# Patient Record
Sex: FEMALE | Race: WHITE | NOT HISPANIC OR LATINO | Employment: FULL TIME | URBAN - METROPOLITAN AREA
[De-identification: names, ages, dates, MRNs, and addresses within clinical notes are randomized per-mention and may not be internally consistent; named-entity substitution may affect disease eponyms.]

---

## 2018-08-03 ENCOUNTER — OFFICE VISIT (OUTPATIENT)
Dept: NEUROLOGY | Facility: CLINIC | Age: 33
End: 2018-08-03
Payer: COMMERCIAL

## 2018-08-03 ENCOUNTER — LAB VISIT (OUTPATIENT)
Dept: LAB | Facility: HOSPITAL | Age: 33
End: 2018-08-03
Attending: PSYCHIATRY & NEUROLOGY
Payer: COMMERCIAL

## 2018-08-03 VITALS
RESPIRATION RATE: 16 BRPM | WEIGHT: 279.13 LBS | SYSTOLIC BLOOD PRESSURE: 148 MMHG | DIASTOLIC BLOOD PRESSURE: 102 MMHG | HEIGHT: 59 IN | BODY MASS INDEX: 56.27 KG/M2 | HEART RATE: 95 BPM

## 2018-08-03 DIAGNOSIS — G37.9 DEMYELINATING DISEASE: ICD-10-CM

## 2018-08-03 DIAGNOSIS — F39 MOOD DISORDER: ICD-10-CM

## 2018-08-03 DIAGNOSIS — G37.9 DEMYELINATING DISEASE: Primary | ICD-10-CM

## 2018-08-03 DIAGNOSIS — R03.0 ELEVATED BLOOD PRESSURE READING: ICD-10-CM

## 2018-08-03 LAB
25(OH)D3+25(OH)D2 SERPL-MCNC: 39 NG/ML
ALBUMIN SERPL BCP-MCNC: 3.4 G/DL
ALP SERPL-CCNC: 88 U/L
ALT SERPL W/O P-5'-P-CCNC: 16 U/L
ANION GAP SERPL CALC-SCNC: 10 MMOL/L
AST SERPL-CCNC: 15 U/L
BASOPHILS # BLD AUTO: 0.04 K/UL
BASOPHILS NFR BLD: 0.4 %
BILIRUB SERPL-MCNC: 0.3 MG/DL
BUN SERPL-MCNC: 13 MG/DL
CALCIUM SERPL-MCNC: 9.3 MG/DL
CHLORIDE SERPL-SCNC: 106 MMOL/L
CO2 SERPL-SCNC: 23 MMOL/L
CREAT SERPL-MCNC: 0.7 MG/DL
DIFFERENTIAL METHOD: ABNORMAL
EOSINOPHIL # BLD AUTO: 0.3 K/UL
EOSINOPHIL NFR BLD: 2.7 %
ERYTHROCYTE [DISTWIDTH] IN BLOOD BY AUTOMATED COUNT: 14.6 %
ERYTHROCYTE [SEDIMENTATION RATE] IN BLOOD BY WESTERGREN METHOD: 16 MM/HR
EST. GFR  (AFRICAN AMERICAN): >60 ML/MIN/1.73 M^2
EST. GFR  (NON AFRICAN AMERICAN): >60 ML/MIN/1.73 M^2
GLUCOSE SERPL-MCNC: 86 MG/DL
HCT VFR BLD AUTO: 39.2 %
HGB BLD-MCNC: 12.6 G/DL
HIV1+2 IGG SERPL QL IA.RAPID: NEGATIVE
LYMPHOCYTES # BLD AUTO: 2.4 K/UL
LYMPHOCYTES NFR BLD: 24.8 %
MCH RBC QN AUTO: 26.5 PG
MCHC RBC AUTO-ENTMCNC: 32.1 G/DL
MCV RBC AUTO: 83 FL
MONOCYTES # BLD AUTO: 0.7 K/UL
MONOCYTES NFR BLD: 7.2 %
NEUTROPHILS # BLD AUTO: 6.2 K/UL
NEUTROPHILS NFR BLD: 64.9 %
PLATELET # BLD AUTO: 481 K/UL
PMV BLD AUTO: 9.2 FL
POTASSIUM SERPL-SCNC: 4.3 MMOL/L
PROT SERPL-MCNC: 7.3 G/DL
RBC # BLD AUTO: 4.75 M/UL
RPR SER QL: NORMAL
SODIUM SERPL-SCNC: 139 MMOL/L
WBC # BLD AUTO: 9.58 K/UL

## 2018-08-03 PROCEDURE — 3008F BODY MASS INDEX DOCD: CPT | Mod: CPTII,S$GLB,, | Performed by: PSYCHIATRY & NEUROLOGY

## 2018-08-03 PROCEDURE — 86038 ANTINUCLEAR ANTIBODIES: CPT

## 2018-08-03 PROCEDURE — 86235 NUCLEAR ANTIGEN ANTIBODY: CPT

## 2018-08-03 PROCEDURE — 86592 SYPHILIS TEST NON-TREP QUAL: CPT

## 2018-08-03 PROCEDURE — 86235 NUCLEAR ANTIGEN ANTIBODY: CPT | Mod: 59

## 2018-08-03 PROCEDURE — 80053 COMPREHEN METABOLIC PANEL: CPT

## 2018-08-03 PROCEDURE — 99999 PR PBB SHADOW E&M-EST. PATIENT-LVL III: CPT | Mod: PBBFAC,,, | Performed by: PSYCHIATRY & NEUROLOGY

## 2018-08-03 PROCEDURE — 82164 ANGIOTENSIN I ENZYME TEST: CPT

## 2018-08-03 PROCEDURE — 82306 VITAMIN D 25 HYDROXY: CPT

## 2018-08-03 PROCEDURE — 80074 ACUTE HEPATITIS PANEL: CPT

## 2018-08-03 PROCEDURE — 86617 LYME DISEASE ANTIBODY: CPT | Mod: 59

## 2018-08-03 PROCEDURE — 86701 HIV-1ANTIBODY: CPT

## 2018-08-03 PROCEDURE — 85025 COMPLETE CBC W/AUTO DIFF WBC: CPT

## 2018-08-03 PROCEDURE — 83519 RIA NONANTIBODY: CPT | Mod: 59

## 2018-08-03 PROCEDURE — 99205 OFFICE O/P NEW HI 60 MIN: CPT | Mod: S$GLB,,, | Performed by: PSYCHIATRY & NEUROLOGY

## 2018-08-03 PROCEDURE — 85651 RBC SED RATE NONAUTOMATED: CPT

## 2018-08-03 PROCEDURE — 36415 COLL VENOUS BLD VENIPUNCTURE: CPT

## 2018-08-03 PROCEDURE — 86256 FLUORESCENT ANTIBODY TITER: CPT | Mod: 91

## 2018-08-03 RX ORDER — NORGESTIMATE AND ETHINYL ESTRADIOL 7DAYSX3 LO
1 KIT ORAL DAILY
COMMUNITY
Start: 2018-07-31 | End: 2020-05-04

## 2018-08-03 RX ORDER — BUPROPION HYDROCHLORIDE 300 MG/1
300 TABLET ORAL DAILY
COMMUNITY
Start: 2018-07-06 | End: 2018-10-11 | Stop reason: SDUPTHER

## 2018-08-03 RX ORDER — FLUOXETINE 20 MG/1
20 TABLET ORAL DAILY
COMMUNITY
End: 2018-10-11

## 2018-08-03 RX ORDER — LEVOTHYROXINE SODIUM 75 UG/1
75 TABLET ORAL
COMMUNITY
Start: 2018-07-06

## 2018-08-03 NOTE — PROGRESS NOTES
HPI: Sandra Castelan is a 32 y.o. female who states who was diagnosed with retrobulbar optic neuritis in June 2018. This was diagnosed at [facility] in [place] while she was on vacation.   She had OCT testing.   She was recommended to have MRI brain, Tspine and C spine which she had at [facility]  She states she was on vacation heading to [place] when she gradual onset of blurred and painful vision in right eye which more severely worsened over 2 days. She was seen in a clinic and was not place on steroids. She has had great recovery of her vision. She was on vacation and declined IV steroids. She followed up with her local eye doc and was told oral was not recommended. She fells she can see much better and she was cleared to drive.  She has seen her MRI reports (PCP ordered this) which are suggestive of MS.  In reviewing this, she reports she has had headaches for years. She has had left arm and hand numbness for years along with left leg numbness. She does admit to fatigue.  She has been treated for anxiety and depression by PCP.She has noted some occasional dizziness.   She repots some poor sleep at times.She has had frequent urgent urination and sometimes infrequent emptying.   She reports some brain fog at times.  She feels her anxiety could be better controlled.     She works full time as a  at a [employer]. She has step children.    Her mom is a retired RN  Review of Systems   Constitutional: Positive for malaise/fatigue. Negative for fever.   HENT: Negative for nosebleeds.    Eyes: Negative for double vision.   Respiratory: Negative for hemoptysis.    Cardiovascular: Negative for leg swelling.   Gastrointestinal: Negative for blood in stool.   Genitourinary: Negative for hematuria.   Musculoskeletal: Negative for falls.   Skin: Negative for rash.   Neurological: Negative for seizures.   Psychiatric/Behavioral: Negative for suicidal ideas. The patient is nervous/anxious.         NO  severe panic         I have reviewed all of this patient's past medical and surgical histories as well as family and social histories and active allergies and medications as documented in the electronic medical record.      Exam:  Gen Appearance, well developed/nourished in no apparent distress  CV: 2+ distal pulses with no edema or swelling  Neuro:  MS: Awake, alert, oriented to place, person, time, situation. Sustains attention. Recent/remote memory intact, Language is full to spontaneous speech/repetition/naming/comprehension. Fund of Knowledge is full  CN: Optic discs are flat with normal vasculature, PERRL, Extraoccular movements and visual fields are full. Normal facial sensation and strength, Hearing symmetric, Tongue and Palate are midline and strong. Shoulder Shrug symmetric and strong.  Motor: Normal bulk, tone, no abnormal movements. 5/5 strength bilateral upper/lower extremities with 2+ reflexes and bilateral plantar response  Sensory: symmetric to light touch, pain, temp, and vibration/proprioception. Romberg negative  Cerebellar: Finger-nose,Heal-shin, Rapid alternating movements intact  Gait: Normal stance, no ataxia    Imagin18 MRI brain: Multiple areas of increased white matter signal in a pattern suggestive of multiple sclerosis, measuring up to 1 cm in the right frontal periventricular region.  Nothing enhancing    18 MRI C spine: 1. No evidence demyelinating process within the cervical cord.  2. Mild disc bulges as above from C3-6, greatest at C5-6.    18 MRI T spine: The thoracic spine demonstrates a normal kyphosis.  There is disc desiccation and loss of disc height from T6-9.  There is a left paracentral disc herniation at T8-9 with contact and mild deformity of the left paracentral cord.  No other focal disc protrusion is noted.  No abnormal cord signal is seen.  No abnormal enhancement is seen within the thoracic cord.  Vertebral body marrow signal is  unremarkable.         Assessment/Plan: Sandra Castelan is a 32 y.o. female diagnosed with optic neuritis in her right eye in 6/2018 by OCT. Subsequent MRI brain 7/2018 shows multiple chronic demyelinating plaques concerning for Multiple Sclerosis. She has noted some left arm and leg numbness for years, dizziness, fatigue, mood disorder, and urinary symptoms. Imaging and cluster of symptoms are likely most consistent with relapsing and remitting MS given her lesions and symptoms   by time and space  I recommend:   1. She was not given IV steroids at the time of acute ON, but has greatly improved   2. Labs for MS mimics: Lyme titers, FRENCH, hep panel, HiV, RPRP Ace-level, SSA/SSB antibodies, ESR, and paraneoplastic panel, CMP, CBC  3. Also check  Vit D  4. Discussed utility of LP. If the above is negative, her symptoms are rather pathognomonic. There are no signs of infection here. No one test is diagnostic of MS. Whether or not oligoclonal bands or other indices are present here, this patient will need treatment for MS if the above studies are negative. We agree to hold on LP unless any changes  5. Discussed various DMT. Gave her info on Copaxone and interferon. Teratogenicity risks reviewed. Will plan to start DMT of her choice after labs are resulted. Will need follow up MRIs in 6-12 months  6. HTN history denied- states her BP runs more normal at home.   7. Will consult psychiatry for mood as she feels anxiety and depression could be better controlled.   8. May need fatigue medications going forward  RTC 3 months  Patient was asked to await lab results to confer about MS therapy moving forward.

## 2018-08-04 LAB
ANTI SM ANTIBODY: 0.11 EU
ANTI-SM INTERPRETATION: NEGATIVE
ANTI-SSA ANTIBODY: 1.33 EU
ANTI-SSA INTERPRETATION: NEGATIVE
ANTI-SSB ANTIBODY: 0 EU
ANTI-SSB INTERPRETATION: NEGATIVE

## 2018-08-06 LAB
ACE SERPL-CCNC: 19 U/L
ANA SER QL IF: NORMAL
HAV IGM SERPL QL IA: NEGATIVE
HBV CORE IGM SERPL QL IA: NEGATIVE
HBV SURFACE AG SERPL QL IA: NEGATIVE
HCV AB SERPL QL IA: NEGATIVE

## 2018-08-07 ENCOUNTER — PATIENT MESSAGE (OUTPATIENT)
Dept: PSYCHIATRY | Facility: CLINIC | Age: 33
End: 2018-08-07

## 2018-08-07 ENCOUNTER — PATIENT MESSAGE (OUTPATIENT)
Dept: NEUROLOGY | Facility: CLINIC | Age: 33
End: 2018-08-07

## 2018-08-07 LAB
B BURGDOR IGG SER QL IB: NEGATIVE
B BURGDOR IGM SER QL IB: NEGATIVE

## 2018-08-09 ENCOUNTER — PATIENT MESSAGE (OUTPATIENT)
Dept: NEUROLOGY | Facility: CLINIC | Age: 33
End: 2018-08-09

## 2018-08-10 ENCOUNTER — TELEPHONE (OUTPATIENT)
Dept: PHARMACY | Facility: CLINIC | Age: 33
End: 2018-08-10

## 2018-08-10 ENCOUNTER — TELEPHONE (OUTPATIENT)
Dept: NEUROLOGY | Facility: CLINIC | Age: 33
End: 2018-08-10

## 2018-08-10 DIAGNOSIS — G35 MULTIPLE SCLEROSIS: Primary | ICD-10-CM

## 2018-08-10 LAB
ACHR BIND AB SER-SCNC: NORMAL NMOL/L
AMPHIPHYSIN AB TITR SER: NEGATIVE TITER
CV2 IGG TITR SER: NEGATIVE TITER
GLIAL NUC TYPE 1 AB TITR SER: NEGATIVE TITER
HU1 AB TITR SER: NEGATIVE TITER
HU2 AB TITR SER IF: NEGATIVE TITER
HU3 AB TITR SER: NEGATIVE TITER
IMMUNOLOGIST REVIEW: NORMAL
NACHR AB SER-SCNC: 0 NMOL/L
PAVAL REFLEX TEST ADDED: NORMAL
PCA-1 AB TITR SER: NEGATIVE TITER
PCA-2 AB TITR SER: NEGATIVE TITER
PCA-TR AB TITR SER: NEGATIVE TITER
STRIA MUS AB TITR SER: NEGATIVE TITER
VGCC-N BIND AB SER-SCNC: 0 NMOL/L
VGCC-P/Q BIND AB SER-SCNC: 0 NMOL/L
VGKC AB SER-SCNC: 0 NMOL/L

## 2018-08-10 RX ORDER — GLATIRAMER 40 MG/ML
40 INJECTION, SOLUTION SUBCUTANEOUS
Qty: 12 ML | Refills: 12 | Status: SHIPPED | OUTPATIENT
Start: 2018-08-10 | End: 2019-08-21

## 2018-08-10 NOTE — TELEPHONE ENCOUNTER
DOCUMENTATION ONLY  FYI  Glatiramer does not require a Prior Authorization through the patient's insurance.    Copay: $430.48    E-voucher applied - copay is $0    Patient Assistance IS NOT required. Forwarded to the clinical pharmacist for consult and shipment.  PRASAD

## 2018-08-10 NOTE — TELEPHONE ENCOUNTER
Patient needs to start Copaxone for MS per orders.  Rx being sent to [facility]. Patient aware  Just FYI

## 2018-08-21 ENCOUNTER — PATIENT MESSAGE (OUTPATIENT)
Dept: NEUROLOGY | Facility: CLINIC | Age: 33
End: 2018-08-21

## 2018-08-21 ENCOUNTER — TELEPHONE (OUTPATIENT)
Dept: PHARMACY | Facility: CLINIC | Age: 33
End: 2018-08-21

## 2018-08-21 NOTE — TELEPHONE ENCOUNTER
Initial Glatiramer 40mg consult completed on 18. Glatiramer  will be shipped on  to arrive at patient's work on  via [employer]. $0 copay. Patient intends to start Glatiramer on .  Provided MYLAN Nurse Educator number: [phone] for [employer] and setting up in-home nurse injection training. Address confirmed, CC on file. Confirmed 2 patient identifiers - name and . Therapy Appropriate.    --Injection experience: none; patient reported she felt comfortable.    Counseled patient on administration directions:   Inject Glatiramer into the skin 3 times per week (separate by 48 hours)   Take out of the refrigerator 30-60 minutes prior to injection.   Wash hands before and after injection.   Monthly RX will come with gauze, Band-Aids, and alcohol swabs.   Patient may inject in either the tops of thighs or abdomen- but at least 2 inches away from the belly button, upper hips, but always below the waist, or the outer or back part of his/ her upper arm.   Patient is to wipe down the injection site with the alcohol pad, wait to dry.  Pinch about a 2 inch fold of skin between the thumb and index finger, insert the needle at a 90 degree angle straight into the skin.  Hold the syringe steady and slowly push down the plunger, then remove the needle.    Patient will use sharps container; once full, per ROD vazquez, he/she may lock the sharps container and place in the trash. He/ She can then contact the Pharmacy and we will replace the sharps at no additional charge.    Patient was counseled on possible side effects:  · Injection site reaction: redness, soreness, itching, bruising, lipoatrophy, swelling, lumps, pain and rash  · Flushing, chest pain, palpitations, anxiety, dyspnea, constriction of the throat, and urticaria. These symptoms are generally transient and self-limited and do not require specific treatment.   · may occur early or may have their onset several months after the initiation of  treatment  · Infection, weakness, upset stomach, flushing, back pain  · Nausea and vomiting  · ER precautions advised for signs and symptoms of allergic reaction      Patient was advised to keep a calendar to stay compliant. Consultation included: indication; goals of treatment; administration; storage and handling; side effects; how to handle side effects; the importance of compliance; how to handle missed doses; the importance of laboratory monitoring; the importance of keeping all follow up appointments.  Patient understands to report any medication changes to [facility] and provider. All questions answered and addressed to patients satisfaction. I will f/u with her in 1 week from start, [facility] to contact patient in 3 weeks for refills.    Charlene Holman, PharmD  Specialty Pharmacy Clinical Pharmacist  [facility]  P: [phone]

## 2018-09-10 ENCOUNTER — PATIENT MESSAGE (OUTPATIENT)
Dept: NEUROLOGY | Facility: CLINIC | Age: 33
End: 2018-09-10

## 2018-09-17 ENCOUNTER — TELEPHONE (OUTPATIENT)
Dept: PHARMACY | Facility: CLINIC | Age: 33
End: 2018-09-17

## 2018-09-17 NOTE — TELEPHONE ENCOUNTER
Attempted to reach patient regarding Glatiramer refill and follow up. Left voicemail and sent [employer] message for patient to return call. Will continue you reach out if no response.     Julio César Thomas, PharmD  Clinical Pharmacist  [facility]  P: [phone]

## 2018-09-22 ENCOUNTER — PATIENT MESSAGE (OUTPATIENT)
Dept: NEUROLOGY | Facility: CLINIC | Age: 33
End: 2018-09-22

## 2018-10-10 ENCOUNTER — TELEPHONE (OUTPATIENT)
Dept: PHARMACY | Facility: CLINIC | Age: 33
End: 2018-10-10

## 2018-10-11 ENCOUNTER — OFFICE VISIT (OUTPATIENT)
Dept: PSYCHIATRY | Facility: CLINIC | Age: 33
End: 2018-10-11
Attending: PSYCHIATRY & NEUROLOGY
Payer: COMMERCIAL

## 2018-10-11 VITALS
RESPIRATION RATE: 18 BRPM | BODY MASS INDEX: 56.84 KG/M2 | HEART RATE: 78 BPM | HEIGHT: 59 IN | WEIGHT: 281.94 LBS | DIASTOLIC BLOOD PRESSURE: 86 MMHG | SYSTOLIC BLOOD PRESSURE: 134 MMHG

## 2018-10-11 DIAGNOSIS — F41.1 GAD (GENERALIZED ANXIETY DISORDER): ICD-10-CM

## 2018-10-11 DIAGNOSIS — F33.0 MILD EPISODE OF RECURRENT MAJOR DEPRESSIVE DISORDER: Primary | ICD-10-CM

## 2018-10-11 PROCEDURE — 3008F BODY MASS INDEX DOCD: CPT | Mod: CPTII,S$GLB,, | Performed by: PSYCHIATRY & NEUROLOGY

## 2018-10-11 PROCEDURE — 99999 PR PBB SHADOW E&M-EST. PATIENT-LVL III: CPT | Mod: PBBFAC,,, | Performed by: PSYCHIATRY & NEUROLOGY

## 2018-10-11 PROCEDURE — 99203 OFFICE O/P NEW LOW 30 MIN: CPT | Mod: S$GLB,,, | Performed by: PSYCHIATRY & NEUROLOGY

## 2018-10-11 PROCEDURE — 90833 PSYTX W PT W E/M 30 MIN: CPT | Mod: S$GLB,,, | Performed by: PSYCHIATRY & NEUROLOGY

## 2018-10-11 RX ORDER — ESCITALOPRAM OXALATE 10 MG/1
10 TABLET ORAL DAILY
Qty: 90 TABLET | Refills: 0 | Status: SHIPPED | OUTPATIENT
Start: 2018-10-11 | End: 2018-12-07 | Stop reason: SDUPTHER

## 2018-10-11 RX ORDER — BUPROPION HYDROCHLORIDE 300 MG/1
300 TABLET ORAL DAILY
Qty: 90 TABLET | Refills: 0 | Status: SHIPPED | OUTPATIENT
Start: 2018-10-11 | End: 2018-12-07 | Stop reason: SDUPTHER

## 2018-10-11 NOTE — PROGRESS NOTES
Outpatient Psychiatry Initial Visit (MD/NP)    10/11/2018    Sandra Castelan, a 32 y.o. female, presenting for initial evaluation visit. Met with patient.    Reason for Encounter: Referral from Dr. Lyn. Patient complains of   Chief Complaint   Patient presents with    Depression    Anxiety   .    History of Present Illness:     Current Medication  Wellbutrin XL 300mg QDay - years  Prozac 20mg QDay - years    Past Medication  Paxil in highschool and doesn't remember how she did  Cymbalta - worsened anxiety  Pristiq - did not do well    Psychosocial   for a vet clinic.  She is engaged and her fiancee has a 12 year old that lives with them full time.  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      Endorses Symptoms of Depression: +diminished mood or +loss of interest/anhedonia; irritability, +diminished energy, +change in sleep, change in appetite, diminished concentration or cognition or indecisiveness, PMA/R, +excessive guilt or hopelessness or worthlessness, suicidal ideations    Concentration somewhat affected by MS    Endorses Changes in Sleep: trouble with initiation, +maintenance, early morning awakening with inability to return to sleep, hypersomnolence     Denies Suicidal/Homicidal ideations: active/passive ideations, organized plans, future intentions    Denies psychosis or regis    Symptoms of TONY: all of the following excessive anxiety/worry/fear, more days than not, about numerous issues, difficult to control, with restlessness, fatigue, poor concentration, irritability, muscle tension, sleep disturbance; causes functionally impairing distress     Symptoms of Panic Disorder: very seldom     Denies Symptoms of Social Anxiety Disorder: excessive fear/anxiety regarding social situations with fear of being negatively evaluated, avoidaant behavior, functionally impairing distress    Denies Symptoms of PTSD: h/o trauma; re-experiencing/intrusive symptoms, avoidant behavior, negative alterations in  cognition or mood, hyperarousal symptoms; with or without dissociative symptoms     Symptoms of Eating Disorders: anorexia, bulimia or binging    Denies Substance Use: intoxication, withdrawal, tolerance, used in larger amounts or duration than intended, unsuccessful attempts to limit or quit, increased time engaging in or seeking out, cravings or strong desire to use, failure to fulfill obligations, negative consequences in social/interpersonal/occupational,/recreational areas, use in dangerous situations, medical or psychological consequences     Drinks occasionally and smokes 3 cigarettes per day    PSYCHOTHERAPY ADD-ON +[id number]   30 (16-37*) minutes    Site: [facility]  Time: 16 minutes  Participants: Met with patient    Therapeutic Intervention Type: behavior modifying psychotherapy, supportive psychotherapy  Why chosen therapy is appropriate versus another modality: relevant to diagnosis    Target symptoms: depression, anxiety , adjustment  Primary focus: coping, anxiety  Psychotherapeutic techniques: supportive behavior modifying psychoeducation    Outcome monitoring methods: self-report, observation    Patient's response to intervention:  The patient's response to intervention is accepting.    Progress toward goals:  The patient's progress toward goals is good.    Discussed diet / exercise, parenting skills and coping      Review Of Systems:     GENERAL:  No weight gain or loss  SKIN:  No rashes or lacerations  HEAD:  No headaches  EYES:  No exophthalmos, jaundice or blindness  EARS:  No dizziness, tinnitus or hearing loss  NOSE:  No changes in smell  MOUTH & THROAT:  No dyskinetic movements or obvious goiter  CHEST:  No shortness of breath, hyperventilation or cough  CARDIOVASCULAR:  No tachycardia or chest pain  ABDOMEN:  No nausea, vomiting, pain, constipation or diarrhea  URINARY:  No frequency, dysuria or sexual dysfunction  ENDOCRINE:  No polydipsia, polyuria  MUSCULOSKELETAL: pain in legs at  "times  NEUROLOGIC:  No weakness, sensory changes, seizures, confusion, memory loss, tremor or other abnormal movements    Current Evaluation:     Nutritional Screening: Considering the patient's height and weight, medications, medical history and preferences, should a referral be made to the dietitian?  yes    Constitutional  Vitals:  Most recent vital signs, dated less than 90 days prior to this appointment, were reviewed.    Vitals:    10/11/18 1221   BP: 134/86   Pulse: 78   Resp: 18   Weight: 127.9 kg (281 lb 15.5 oz)   Height: 4' 11" (1.499 m)        General:  unremarkable, age appropriate     Musculoskeletal  Muscle Strength/Tone:  no spasicity, no rigidity   Gait & Station:  non-ataxic     Psychiatric  Speech:  no latency; no press   Mood & Affect:  steady, happy  congruent and appropriate   Thought Process:  normal and logical   Associations:  intact   Thought Content:  normal, no suicidality, no homicidality, delusions, or paranoia   Insight:  intact   Judgement: behavior is adequate to circumstances   Orientation:  grossly intact   Memory: intact for content of interview   Language: grossly intact   Attention Span & Concentration:  able to focus   Fund of Knowledge:  intact and appropriate to age and level of education       Relevant Elements of Neurological Exam: normal gait    Functioning in Relationships:  Spouse/partner: engaged good  Peers: good  Employers: good    Laboratory Data  No visits with results within 1 Month(s) from this visit.   Latest known visit with results is:   Lab Visit on 08/03/2018   Component Date Value Ref Range Status    FRENCH Screen 08/03/2018 Negative <1:160  Negative <1:160 Final    Sed Rate 08/03/2018 16  0 - 20 mm/Hr Final    Angio Convert Enzyme 08/03/2018 19  8 - 53 U/L Final    LYME AB IGG BY WB: 08/03/2018 Negative  Negative Final    Lyme Ab IgM by WB: 08/03/2018 Negative  Negative Final    Anti Sm Antibody 08/03/2018 0.11  0.00 - 19.99 EU Final    Anti-Sm " Interpretation 08/03/2018 Negative  Negative Final    Anti-SSB Antibody 08/03/2018 0.00  0.00 - 19.99 EU Final    Anti-SSB Interpretation 08/03/2018 Negative  Negative Final    Anti-SSA Antibody 08/03/2018 1.33  0.00 - 19.99 EU Final    Anti-SSA Interpretation 08/03/2018 Negative  Negative Final    HIV Rapid Testing 08/03/2018 Negative  Negative Final    Hepatitis B Surface Ag 08/03/2018 Negative   Final    Hep B C IgM 08/03/2018 Negative   Final    Hep A IgM 08/03/2018 Negative   Final    Hepatitis C Ab 08/03/2018 Negative   Final    Vit D, 25-Hydroxy 08/03/2018 39  30 - 96 ng/mL Final    RPR 08/03/2018 Non-reactive  Non-reactive Final    NMO Interpretive Comments 08/03/2018 SEE BELOW   Final    PAVAL CASH-1, Serum 08/03/2018 Negative  <1:240 titer Final    PAVAL reflex test added 08/03/2018 None.   Final    PAVAL CASH-2, Serum 08/03/2018 Negative  <1:240 titer Final    PAVAL CASH-3, Serum 08/03/2018 Negative  <1:240 titer Final    PAVAL AGNA-1, Serum 08/03/2018 Negative  <1:240 titer Final    PAVAL, PCA-1, Serum 08/03/2018 Negative  <1:240 titer Final    PAVAL, PCA-2, Serum 08/03/2018 Negative  <1:240 titer Final    PAVAL, PCA-Tr, Serum 08/03/2018 Negative  <1:240 titer Final    PAVAL,  Amphiphysin Ab, Serum 08/03/2018 Negative  <1:240 titer Final    CRMP-5 IgG 08/03/2018 Negative  <1:240 titer Final    Striated Muscle Ab 08/03/2018 Negative  <1:120 titer Final    P/Q Type Calcium Channel Ab 08/03/2018 0.00  <=0.02 nmol/L Final    N-Type Calcium Channel Ab 08/03/2018 0.00  <=0.03 nmol/L Final    AChR Binding Ab, Serum 08/03/2018 SEE BELOW   Final    AChR Ganglionic Neuronal Ab 08/03/2018 0.00  <=0.02 nmol/L Final    Neuronal (V-G) K+ Channel Ab, Serum 08/03/2018 0.00  <=0.02 nmol/L Final    Sodium 08/03/2018 139  136 - 145 mmol/L Final    Potassium 08/03/2018 4.3  3.5 - 5.1 mmol/L Final    Chloride 08/03/2018 106  95 - 110 mmol/L Final    CO2 08/03/2018 23  23 - 29 mmol/L Final     Glucose 08/03/2018 86  70 - 110 mg/dL Final    BUN, Bld 08/03/2018 13  6 - 20 mg/dL Final    Creatinine 08/03/2018 0.7  0.5 - 1.4 mg/dL Final    Calcium 08/03/2018 9.3  8.7 - 10.5 mg/dL Final    Total Protein 08/03/2018 7.3  6.0 - 8.4 g/dL Final    Albumin 08/03/2018 3.4* 3.5 - 5.2 g/dL Final    Total Bilirubin 08/03/2018 0.3  0.1 - 1.0 mg/dL Final    Alkaline Phosphatase 08/03/2018 88  55 - 135 U/L Final    AST 08/03/2018 15  10 - 40 U/L Final    ALT 08/03/2018 16  10 - 44 U/L Final    Anion Gap 08/03/2018 10  8 - 16 mmol/L Final    eGFR if African American 08/03/2018 >60.0  >60 mL/min/1.73 m^2 Final    eGFR if non African American 08/03/2018 >60.0  >60 mL/min/1.73 m^2 Final    WBC 08/03/2018 9.58  3.90 - 12.70 K/uL Final    RBC 08/03/2018 4.75  4.00 - 5.40 M/uL Final    Hemoglobin 08/03/2018 12.6  12.0 - 16.0 g/dL Final    Hematocrit 08/03/2018 39.2  37.0 - 48.5 % Final    MCV 08/03/2018 83  82 - 98 fL Final    MCH 08/03/2018 26.5* 27.0 - 31.0 pg Final    MCHC 08/03/2018 32.1  32.0 - 36.0 g/dL Final    RDW 08/03/2018 14.6* 11.5 - 14.5 % Final    Platelets 08/03/2018 481* 150 - 350 K/uL Final    MPV 08/03/2018 9.2  9.2 - 12.9 fL Final    Gran # (ANC) 08/03/2018 6.2  1.8 - 7.7 K/uL Final    Lymph # 08/03/2018 2.4  1.0 - 4.8 K/uL Final    Mono # 08/03/2018 0.7  0.3 - 1.0 K/uL Final    Eos # 08/03/2018 0.3  0.0 - 0.5 K/uL Final    Baso # 08/03/2018 0.04  0.00 - 0.20 K/uL Final    Gran% 08/03/2018 64.9  38.0 - 73.0 % Final    Lymph% 08/03/2018 24.8  18.0 - 48.0 % Final    Mono% 08/03/2018 7.2  4.0 - 15.0 % Final    Eosinophil% 08/03/2018 2.7  0.0 - 8.0 % Final    Basophil% 08/03/2018 0.4  0.0 - 1.9 % Final    Differential Method 08/03/2018 Automated   Final         Medications  Outpatient Encounter Medications as of 10/11/2018   Medication Sig Dispense Refill    aspirin-acetaminophen-caffeine 250-250-65 mg (EXCEDRIN MIGRAINE) 250-250-65 mg per tablet Take 1 tablet by mouth every 6  (six) hours as needed for Pain.      buPROPion (WELLBUTRIN XL) 300 MG 24 hr tablet Take 1 tablet (300 mg total) by mouth once daily. 90 tablet 0    BUTTERBUR ROOT EXTRACT ORAL Take by mouth.      ergocalciferol, vitamin D2, (VITAMIN D ORAL) Take 800 mg by mouth once daily.      glatiramer (COPAXONE) 40 mg/mL Syrg injection Inject 40 mg into the skin 3 (three) times a week. 12 mL 12    levothyroxine (SYNTHROID) 75 MCG tablet Take 75 mcg by mouth before breakfast.       liothyronine (CYTOMEL) 5 MCG Tab Take 10 mcg by mouth once daily.       omeprazole/sodium bicarbonate (ZEGERID OTC ORAL) Take 1 tablet by mouth 2 (two) times daily as needed.       TRI-LO-ANAID 0.18/0.215/0.25 mg-25 mcg tablet Take 1 tablet by mouth once daily.       [DISCONTINUED] buPROPion (WELLBUTRIN XL) 300 MG 24 hr tablet Take 300 mg by mouth once daily.       [DISCONTINUED] FLUoxetine 20 MG tablet Take 20 mg by mouth once daily.       escitalopram oxalate (LEXAPRO) 10 MG tablet Take 1 tablet (10 mg total) by mouth once daily. 90 tablet 0     No facility-administered encounter medications on file as of 10/11/2018.            Assessment - Diagnosis - Goals:     Impression:       ICD-10-CM ICD-9-CM   1. Mild episode of recurrent major depressive disorder F33.0 296.31   2. TONY (generalized anxiety disorder) F41.1 300.02       Strengths and Liabilities: Strength: Patient accepts guidance/feedback, Strength: Patient is expressive/articulate., Strength: Patient is intelligent., Strength: Patient is motivated for change.    Treatment Goals:  Specify outcomes written in observable, behavioral terms:   cessation of depression and anxiety symptoms    Treatment Plan/Recommendations:     Depression  Stop Prozac  Start Lexapro 10mg QDay  Continue Wellbutrin XL 300mg QDay    Anxiety  Lexapro  Meditation    Obesity  Discussed diet / exercise      Return to Clinic: 1 month    Counseling time: 16  Total time: 35  Consulting clinician was informed of  the encounter and consult note.

## 2018-10-12 ENCOUNTER — TELEPHONE (OUTPATIENT)
Dept: PHARMACY | Facility: CLINIC | Age: 33
End: 2018-10-12

## 2018-10-12 NOTE — TELEPHONE ENCOUNTER
Incoming call regarding Glatiramer.  Confirmed two patient identifiers - name + . Patient denies any missed doses or side effects. She has 5 doses remaining to get her through next week and the following Monday. She confirms no changes to her allergies, health conditions or insurance. She does mention discontinuing fluoxetine and starting Lexapro today - no DDIs. She asked [facility] to ship glatiramer out on 10/18/18 to arrive at her home on 10/19/18 via [employer]. Address confirmed, $0 copay (004), no further supplies needed. All questions answered to patient's satisfaction, [facility] to continue to call for refills monthly. TTN

## 2018-11-05 ENCOUNTER — LAB VISIT (OUTPATIENT)
Dept: LAB | Facility: HOSPITAL | Age: 33
End: 2018-11-05
Attending: PSYCHIATRY & NEUROLOGY
Payer: COMMERCIAL

## 2018-11-05 ENCOUNTER — PATIENT MESSAGE (OUTPATIENT)
Dept: NEUROLOGY | Facility: CLINIC | Age: 33
End: 2018-11-05

## 2018-11-05 ENCOUNTER — OFFICE VISIT (OUTPATIENT)
Dept: NEUROLOGY | Facility: CLINIC | Age: 33
End: 2018-11-05
Payer: COMMERCIAL

## 2018-11-05 VITALS
DIASTOLIC BLOOD PRESSURE: 86 MMHG | SYSTOLIC BLOOD PRESSURE: 126 MMHG | HEIGHT: 59 IN | HEART RATE: 94 BPM | BODY MASS INDEX: 57.33 KG/M2 | RESPIRATION RATE: 18 BRPM | WEIGHT: 284.38 LBS

## 2018-11-05 DIAGNOSIS — F39 MOOD DISORDER: ICD-10-CM

## 2018-11-05 DIAGNOSIS — G43.009 MIGRAINE WITHOUT AURA AND WITHOUT STATUS MIGRAINOSUS, NOT INTRACTABLE: ICD-10-CM

## 2018-11-05 DIAGNOSIS — G35 MULTIPLE SCLEROSIS: Primary | ICD-10-CM

## 2018-11-05 DIAGNOSIS — R79.89 ELEVATED PLATELET COUNT: ICD-10-CM

## 2018-11-05 DIAGNOSIS — G37.9 DEMYELINATING DISEASE: ICD-10-CM

## 2018-11-05 LAB
BASOPHILS # BLD AUTO: 0.05 K/UL
BASOPHILS NFR BLD: 0.4 %
DIFFERENTIAL METHOD: ABNORMAL
EOSINOPHIL # BLD AUTO: 0.3 K/UL
EOSINOPHIL NFR BLD: 2.3 %
ERYTHROCYTE [DISTWIDTH] IN BLOOD BY AUTOMATED COUNT: 14.9 %
HCT VFR BLD AUTO: 36.5 %
HGB BLD-MCNC: 11.7 G/DL
LYMPHOCYTES # BLD AUTO: 3.1 K/UL
LYMPHOCYTES NFR BLD: 22.6 %
MCH RBC QN AUTO: 26.2 PG
MCHC RBC AUTO-ENTMCNC: 32.1 G/DL
MCV RBC AUTO: 82 FL
MONOCYTES # BLD AUTO: 1.1 K/UL
MONOCYTES NFR BLD: 7.9 %
NEUTROPHILS # BLD AUTO: 9.1 K/UL
NEUTROPHILS NFR BLD: 66.8 %
PLATELET # BLD AUTO: 442 K/UL
PMV BLD AUTO: 9.1 FL
RBC # BLD AUTO: 4.47 M/UL
WBC # BLD AUTO: 13.65 K/UL

## 2018-11-05 PROCEDURE — 36415 COLL VENOUS BLD VENIPUNCTURE: CPT

## 2018-11-05 PROCEDURE — 99214 OFFICE O/P EST MOD 30 MIN: CPT | Mod: S$GLB,,, | Performed by: PSYCHIATRY & NEUROLOGY

## 2018-11-05 PROCEDURE — 3008F BODY MASS INDEX DOCD: CPT | Mod: CPTII,S$GLB,, | Performed by: PSYCHIATRY & NEUROLOGY

## 2018-11-05 PROCEDURE — 99999 PR PBB SHADOW E&M-EST. PATIENT-LVL III: CPT | Mod: PBBFAC,,, | Performed by: PSYCHIATRY & NEUROLOGY

## 2018-11-05 PROCEDURE — 85025 COMPLETE CBC W/AUTO DIFF WBC: CPT

## 2018-11-05 RX ORDER — ZONISAMIDE 25 MG/1
CAPSULE ORAL
Qty: 90 CAPSULE | Refills: 5 | Status: SHIPPED | OUTPATIENT
Start: 2018-11-05 | End: 2019-03-11

## 2018-11-08 ENCOUNTER — TELEPHONE (OUTPATIENT)
Dept: PHARMACY | Facility: CLINIC | Age: 33
End: 2018-11-08

## 2018-11-12 ENCOUNTER — TELEPHONE (OUTPATIENT)
Dept: PHARMACY | Facility: CLINIC | Age: 33
End: 2018-11-12

## 2018-11-14 ENCOUNTER — PATIENT MESSAGE (OUTPATIENT)
Dept: NEUROLOGY | Facility: CLINIC | Age: 33
End: 2018-11-14

## 2018-12-06 ENCOUNTER — TELEPHONE (OUTPATIENT)
Dept: PHARMACY | Facility: CLINIC | Age: 33
End: 2018-12-06

## 2018-12-07 ENCOUNTER — OFFICE VISIT (OUTPATIENT)
Dept: PSYCHIATRY | Facility: CLINIC | Age: 33
End: 2018-12-07
Attending: PSYCHIATRY & NEUROLOGY
Payer: COMMERCIAL

## 2018-12-07 VITALS
DIASTOLIC BLOOD PRESSURE: 82 MMHG | RESPIRATION RATE: 20 BRPM | WEIGHT: 285.5 LBS | HEART RATE: 92 BPM | SYSTOLIC BLOOD PRESSURE: 130 MMHG | BODY MASS INDEX: 57.56 KG/M2 | HEIGHT: 59 IN

## 2018-12-07 DIAGNOSIS — F33.41 RECURRENT MAJOR DEPRESSIVE DISORDER, IN PARTIAL REMISSION: Primary | ICD-10-CM

## 2018-12-07 DIAGNOSIS — F41.1 GAD (GENERALIZED ANXIETY DISORDER): ICD-10-CM

## 2018-12-07 PROCEDURE — 99999 PR PBB SHADOW E&M-EST. PATIENT-LVL III: CPT | Mod: PBBFAC,,, | Performed by: PSYCHIATRY & NEUROLOGY

## 2018-12-07 PROCEDURE — 3008F BODY MASS INDEX DOCD: CPT | Mod: CPTII,S$GLB,, | Performed by: PSYCHIATRY & NEUROLOGY

## 2018-12-07 PROCEDURE — 99213 OFFICE O/P EST LOW 20 MIN: CPT | Mod: S$GLB,,, | Performed by: PSYCHIATRY & NEUROLOGY

## 2018-12-07 RX ORDER — BUPROPION HYDROCHLORIDE 300 MG/1
300 TABLET ORAL DAILY
Qty: 90 TABLET | Refills: 1 | Status: SHIPPED | OUTPATIENT
Start: 2018-12-07

## 2018-12-07 RX ORDER — HYDROCHLOROTHIAZIDE 12.5 MG/1
12.5 TABLET ORAL DAILY
COMMUNITY
Start: 2018-11-12 | End: 2019-03-11

## 2018-12-07 RX ORDER — ESCITALOPRAM OXALATE 10 MG/1
10 TABLET ORAL DAILY
Qty: 90 TABLET | Refills: 1 | Status: SHIPPED | OUTPATIENT
Start: 2018-12-07 | End: 2019-08-28

## 2018-12-07 NOTE — PROGRESS NOTES
Outpatient Psychiatry Follow-Up Visit (MD/NP)    12/7/2018    Clinical Status of Patient:  Outpatient (Ambulatory)    Chief Complaint:  Sandra Castelan is a 33 y.o. female who presents today for follow-up of depression and anxiety.  Met with patient.      Interval History and Content of Current Session:  Interim Events/Subjective Report/Content of Current Session:     Current Medication  Wellbutrin XL 300mg QDay - years  Lexapro 10mg QDay    Past Medication  Paxil in highschool and doesn't remember how she did  Cymbalta - worsened anxiety  Pristiq - did not do well     Psychosocial   for a vet clinic.  She is engaged and her fiancee has a 12 year old that lives with them full time.  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      Substance use  Smokes cigarettes four times per day     Improved Symptoms of Depression: less diminished mood or +loss of interest/anhedonia; irritability, improved diminished energy, +change in sleep, change in appetite, diminished concentration or cognition or indecisiveness, PMA/R, resolved excessive guilt or hopelessness or worthlessness, suicidal ideations     Concentration somewhat affected by MS     Improved Changes in Sleep: improved trouble with initiation, less maintenance, early morning awakening with inability to return to sleep, hypersomnolence      Denies Suicidal/Homicidal ideations: active/passive ideations, organized plans, future intentions     Denies psychosis or regis     Improved Symptoms of TONY: all of the following excessive anxiety/worry/fear, more days than not, about numerous issues, difficult to control, with restlessness, fatigue, poor concentration, irritability, muscle tension, sleep disturbance; causes functionally impairing distress     Psychotherapy:  · Target symptoms: depression, anxiety   · Why chosen therapy is appropriate versus another modality: relevant to diagnosis  · Outcome monitoring methods: self-report, observation  · Therapeutic intervention  "type: behavior modifying psychotherapy, supportive psychotherapy  · Topics discussed/themes: difficulty managing affect in interpersonal relationships, building skills sets for symptom management  · The patient's response to the intervention is accepting. The patient's progress toward treatment goals is good.   · Duration of intervention: 10 minutes.  She did not start meditation  Review of Systems   · PSYCHIATRIC: Pertinant items are noted in the narrative.  · CONSTITUTIONAL: No weight gain or loss.   · MUSCULOSKELETAL: No pain or stiffness of the joints.  · NEUROLOGIC: gets chronic migraines  · RESPIRATORY: No shortness of breath.  · CARDIOVASCULAR: No tachycardia or chest pain.  · GASTROINTESTINAL: No nausea, vomiting, pain, constipation or diarrhea.  · GENITOURINARY: No frequency, dysuria or sexual dysfunction.    Past Medical, Family and Social History: The patient's past medical, family and social history have been reviewed and updated as appropriate within the electronic medical record - see encounter notes.    Compliance: yes    Side effects: None    Risk Parameters:  Patient reports no suicidal ideation  Patient reports no homicidal ideation  Patient reports no self-injurious behavior  Patient reports no violent behavior    Exam (detailed: at least 9 elements; comprehensive: all 15 elements)   Constitutional  Vitals:  Most recent vital signs, dated less than 90 days prior to this appointment, were reviewed.   Vitals:    12/07/18 1500   BP: 130/82   Pulse: 92   Resp: 20   Weight: 129.5 kg (285 lb 7.9 oz)   Height: 4' 11" (1.499 m)        General:  unremarkable, age appropriate     Musculoskeletal  Muscle Strength/Tone:  no spasicity, no rigidity   Gait & Station:  non-ataxic     Psychiatric  Speech:  no latency; no press   Mood & Affect:  steady, euthymic  congruent and appropriate   Thought Process:  normal and logical   Associations:  intact   Thought Content:  normal, no suicidality, no homicidality, " delusions, or paranoia   Insight:  intact   Judgement: behavior is adequate to circumstances   Orientation:  grossly intact   Memory: intact for content of interview   Language: grossly intact   Attention Span & Concentration:  able to focus   Fund of Knowledge:  intact and appropriate to age and level of education     Assessment and Diagnosis   Status/Progress: Based on the examination today, the patient's problem(s) is/are improved and well controlled.  New problems have not been presented today.   Co-morbidities are not complicating management of the primary condition.  There are no active rule-out diagnoses for this patient at this time.     General Impression:       ICD-10-CM ICD-9-CM   1. Recurrent major depressive disorder, in partial remission F33.41 296.35   2. TONY (generalized anxiety disorder) F41.1 300.02       Intervention/Counseling/Treatment Plan       Depression    Continue Lexapro 10mg QDay  Continue Wellbutrin XL 300mg QDay     Anxiety  Lexapro  Meditation     Obesity  Discussed diet / exercise          Return to Clinic: as needed

## 2018-12-07 NOTE — TELEPHONE ENCOUNTER
Patient returned refill call for Glatiramer. Delivery confirmed for Thursday 12/13/18. $0.00 copay at 004.     Julio César Thomas, PharmD  Clinical Pharmacist  [facility]  P: [phone]

## 2019-01-09 ENCOUNTER — PATIENT MESSAGE (OUTPATIENT)
Dept: NEUROLOGY | Facility: CLINIC | Age: 34
End: 2019-01-09

## 2019-01-30 ENCOUNTER — TELEPHONE (OUTPATIENT)
Dept: PHARMACY | Facility: CLINIC | Age: 34
End: 2019-01-30

## 2019-03-11 ENCOUNTER — PATIENT MESSAGE (OUTPATIENT)
Dept: NEUROLOGY | Facility: CLINIC | Age: 34
End: 2019-03-11

## 2019-03-11 ENCOUNTER — LAB VISIT (OUTPATIENT)
Dept: LAB | Facility: HOSPITAL | Age: 34
End: 2019-03-11
Attending: PSYCHIATRY & NEUROLOGY
Payer: COMMERCIAL

## 2019-03-11 ENCOUNTER — OFFICE VISIT (OUTPATIENT)
Dept: NEUROLOGY | Facility: CLINIC | Age: 34
End: 2019-03-11
Payer: COMMERCIAL

## 2019-03-11 VITALS
SYSTOLIC BLOOD PRESSURE: 138 MMHG | HEART RATE: 92 BPM | HEIGHT: 59 IN | BODY MASS INDEX: 57.73 KG/M2 | WEIGHT: 286.38 LBS | DIASTOLIC BLOOD PRESSURE: 98 MMHG | RESPIRATION RATE: 18 BRPM

## 2019-03-11 DIAGNOSIS — G35 MULTIPLE SCLEROSIS: Primary | ICD-10-CM

## 2019-03-11 DIAGNOSIS — D72.828 OTHER ELEVATED WHITE BLOOD CELL (WBC) COUNT: ICD-10-CM

## 2019-03-11 DIAGNOSIS — D64.9 ANEMIA, UNSPECIFIED TYPE: ICD-10-CM

## 2019-03-11 DIAGNOSIS — E66.01 MORBID OBESITY WITH BMI OF 50.0-59.9, ADULT: ICD-10-CM

## 2019-03-11 DIAGNOSIS — G43.009 MIGRAINE WITHOUT AURA AND WITHOUT STATUS MIGRAINOSUS, NOT INTRACTABLE: ICD-10-CM

## 2019-03-11 DIAGNOSIS — R79.89 ELEVATED PLATELET COUNT: ICD-10-CM

## 2019-03-11 LAB
BASOPHILS # BLD AUTO: 0.04 K/UL
BASOPHILS NFR BLD: 0.3 %
DIFFERENTIAL METHOD: ABNORMAL
EOSINOPHIL # BLD AUTO: 0.2 K/UL
EOSINOPHIL NFR BLD: 1.7 %
ERYTHROCYTE [DISTWIDTH] IN BLOOD BY AUTOMATED COUNT: 15.3 %
HCT VFR BLD AUTO: 37.9 %
HGB BLD-MCNC: 12 G/DL
LYMPHOCYTES # BLD AUTO: 2.6 K/UL
LYMPHOCYTES NFR BLD: 22.2 %
MCH RBC QN AUTO: 26.2 PG
MCHC RBC AUTO-ENTMCNC: 31.7 G/DL
MCV RBC AUTO: 83 FL
MONOCYTES # BLD AUTO: 0.7 K/UL
MONOCYTES NFR BLD: 5.7 %
NEUTROPHILS # BLD AUTO: 8.1 K/UL
NEUTROPHILS NFR BLD: 70.1 %
PLATELET # BLD AUTO: 440 K/UL
PMV BLD AUTO: 9.2 FL
RBC # BLD AUTO: 4.58 M/UL
WBC # BLD AUTO: 11.6 K/UL

## 2019-03-11 PROCEDURE — 99999 PR PBB SHADOW E&M-EST. PATIENT-LVL IV: ICD-10-PCS | Mod: PBBFAC,,, | Performed by: PSYCHIATRY & NEUROLOGY

## 2019-03-11 PROCEDURE — 85025 COMPLETE CBC W/AUTO DIFF WBC: CPT

## 2019-03-11 PROCEDURE — 99214 OFFICE O/P EST MOD 30 MIN: CPT | Mod: S$GLB,,, | Performed by: PSYCHIATRY & NEUROLOGY

## 2019-03-11 PROCEDURE — 36415 COLL VENOUS BLD VENIPUNCTURE: CPT

## 2019-03-11 PROCEDURE — 3008F BODY MASS INDEX DOCD: CPT | Mod: CPTII,S$GLB,, | Performed by: PSYCHIATRY & NEUROLOGY

## 2019-03-11 PROCEDURE — 99214 PR OFFICE/OUTPT VISIT, EST, LEVL IV, 30-39 MIN: ICD-10-PCS | Mod: S$GLB,,, | Performed by: PSYCHIATRY & NEUROLOGY

## 2019-03-11 PROCEDURE — 99999 PR PBB SHADOW E&M-EST. PATIENT-LVL IV: CPT | Mod: PBBFAC,,, | Performed by: PSYCHIATRY & NEUROLOGY

## 2019-03-11 PROCEDURE — 3008F PR BODY MASS INDEX (BMI) DOCUMENTED: ICD-10-PCS | Mod: CPTII,S$GLB,, | Performed by: PSYCHIATRY & NEUROLOGY

## 2019-03-11 RX ORDER — LISINOPRIL 10 MG/1
10 TABLET ORAL EVERY MORNING
COMMUNITY

## 2019-03-11 RX ORDER — ZONISAMIDE 100 MG/1
100 CAPSULE ORAL NIGHTLY
Qty: 90 CAPSULE | Refills: 3 | Status: SHIPPED | OUTPATIENT
Start: 2019-03-11 | End: 2019-07-24 | Stop reason: ALTCHOICE

## 2019-03-11 NOTE — PROGRESS NOTES
HPI: Sandra Castelan is a 33 y.o. female with MS    Since the last visit, the patient's PCP had been following her CBC abnormalities but she could not follow up further. She is due now.   I also suggested she see urology for urinary complaint    Her Zonegran use for migraine prevention is helping and she still has daily headaches which are usually not a severe.   She has not lost weight and tolerating well.  She continues with some leg pain in both legs throughout which has been chronic with numbness as prior.   She is getting  Saturday.    No new weakness, numbness or visual changes.   Review of Systems   Constitutional: Negative for fever.   HENT: Negative for nosebleeds.    Eyes: Negative for double vision.   Respiratory: Negative for hemoptysis.    Cardiovascular: Negative for leg swelling.   Gastrointestinal: Negative for blood in stool.   Genitourinary: Negative for hematuria.   Musculoskeletal: Negative for falls.   Skin: Negative for rash.   Neurological: Negative for focal weakness.   Psychiatric/Behavioral: The patient does not have insomnia.          I have reviewed all of this patient's past medical and surgical histories as well as family and social histories and active allergies and medications as documented in the electronic medical record.      Exam:  Gen Appearance, well developed/nourished in no apparent distress  CV: 2+ distal pulses with no edema or swelling  Neuro:  MS: Awake, alert, oriented to place, person, time, situation. Sustains attention. Recent/remote memory intact, Language is full to spontaneous speech/comprehension. Fund of Knowledge is full  CN: Optic discs are flat with normal vasculature, PERRL, Extraoccular movements and visual fields are full. Normal facial sensation and strength, Hearing symmetric, Tongue and Palate are midline and strong. Shoulder Shrug symmetric and strong.  Motor: Normal bulk, tone, no abnormal movements. 5/5 strength bilateral upper/lower extremities  with 2+ reflexes and no clonus  Sensory: symmetric to light touch, pain, temp, and vibration,  Romberg negative  Cerebellar: Finger-nose,Heal-shin, Rapid alternating movements intact  Gait: Normal stance, no ataxia    Imagin18 MRI brain: Multiple areas of increased white matter signal in a pattern suggestive of multiple sclerosis, measuring up to 1 cm in the right frontal periventricular region.  Nothing enhancing    18 MRI C spine: 1. No evidence demyelinating process within the cervical cord.  2. Mild disc bulges as above from C3-6, greatest at C5-6.    18 MRI T spine: The thoracic spine demonstrates a normal kyphosis.  There is disc desiccation and loss of disc height from T6-9.  There is a left paracentral disc herniation at T8-9 with contact and mild deformity of the left paracentral cord.  No other focal disc protrusion is noted.  No abnormal cord signal is seen.  No abnormal enhancement is seen within the thoracic cord.  Vertebral body marrow signal is unremarkable.     Labs: 2018 Lyme titers, FRENCH, hep panel, HiV, RPRP Ace-level, SSA/SSB antibodies, ESR, and paraneoplastic panel, CMP, CBC unremarkable  Vit D 39  CBC 2018:  white blood cell count is mildly elevated, the platelet count is still elevated, and there is mild anemia.     CMP normal 10/2018  Vit D normal 10/2018      Assessment/Plan: Sandra Castelan is a 33 y.o. female diagnosed with optic neuritis in her right eye in 2018 by OCT. Subsequent MRI brain 2018 shows multiple chronic demyelinating plaques concerning for Multiple Sclerosis. She has noted some left arm and leg numbness for years, dizziness, fatigue, mood disorder, and urinary symptoms. Imaging and cluster of symptoms are consistent with relapsing and remitting MS given her lesions and symptoms   by time and space  I recommend:   1. She was not given IV steroids at the time of acute ON, but symptoms improved.   2. Labs for MS mimics were normal  3. Continue Vit  Supplementation for Vit D deficiency  4. She is treated for MS with Copaxone and is tolerating well. LP was not done as her symptoms are rather pathognomonic. Teratogenicity risks of this med reviewed.   -PCP did follow her mildly elevated  white blood cell count, mildly elevated  platelet count and there is mild anemia not clearly explained by copaxone use  -Repeat CBC today and consider hematology consult if not improved.   5. Plan to rescan with MRI studies (brain) after the next visit  6. Treatment with psychiatry helped her anxiety and fatigue  7. Zonegran for migraine without aura headache prevention is staring to help. Raise dose to 100mg nightly (which may also help reduce her obesity).   Teratogenicity risk reviewed. Urged weight loss as weight likely contributes to some of her somatic pain.   -Butterburr not very helpful. No relief with Topamax prior  8. Urology consulted prior for dribbling urine at times, and she is considering this soon.       RTC 6 months

## 2019-04-04 ENCOUNTER — PATIENT MESSAGE (OUTPATIENT)
Dept: NEUROLOGY | Facility: CLINIC | Age: 34
End: 2019-04-04

## 2019-05-01 ENCOUNTER — TELEPHONE (OUTPATIENT)
Dept: PHARMACY | Facility: CLINIC | Age: 34
End: 2019-05-01

## 2019-05-06 ENCOUNTER — PATIENT MESSAGE (OUTPATIENT)
Dept: NEUROLOGY | Facility: CLINIC | Age: 34
End: 2019-05-06

## 2019-05-28 ENCOUNTER — TELEPHONE (OUTPATIENT)
Dept: PHARMACY | Facility: CLINIC | Age: 34
End: 2019-05-28

## 2019-06-24 ENCOUNTER — TELEPHONE (OUTPATIENT)
Dept: PHARMACY | Facility: CLINIC | Age: 34
End: 2019-06-24

## 2019-07-22 ENCOUNTER — PATIENT MESSAGE (OUTPATIENT)
Dept: NEUROLOGY | Facility: CLINIC | Age: 34
End: 2019-07-22

## 2019-07-24 ENCOUNTER — TELEPHONE (OUTPATIENT)
Dept: PHARMACY | Facility: CLINIC | Age: 34
End: 2019-07-24

## 2019-07-24 ENCOUNTER — PATIENT MESSAGE (OUTPATIENT)
Dept: NEUROLOGY | Facility: CLINIC | Age: 34
End: 2019-07-24

## 2019-07-24 ENCOUNTER — LAB VISIT (OUTPATIENT)
Dept: LAB | Facility: HOSPITAL | Age: 34
End: 2019-07-24
Attending: NURSE PRACTITIONER
Payer: COMMERCIAL

## 2019-07-24 ENCOUNTER — OFFICE VISIT (OUTPATIENT)
Dept: NEUROLOGY | Facility: CLINIC | Age: 34
End: 2019-07-24
Payer: COMMERCIAL

## 2019-07-24 VITALS
RESPIRATION RATE: 14 BRPM | BODY MASS INDEX: 54.22 KG/M2 | WEIGHT: 268.94 LBS | DIASTOLIC BLOOD PRESSURE: 88 MMHG | SYSTOLIC BLOOD PRESSURE: 138 MMHG | HEART RATE: 92 BPM | HEIGHT: 59 IN

## 2019-07-24 DIAGNOSIS — R79.89 ELEVATED PLATELET COUNT: ICD-10-CM

## 2019-07-24 DIAGNOSIS — I10 HYPERTENSION, UNSPECIFIED TYPE: ICD-10-CM

## 2019-07-24 DIAGNOSIS — E03.9 HYPOTHYROIDISM, UNSPECIFIED TYPE: ICD-10-CM

## 2019-07-24 DIAGNOSIS — G35 MULTIPLE SCLEROSIS: Primary | ICD-10-CM

## 2019-07-24 DIAGNOSIS — H46.9 OPTIC NEURITIS: ICD-10-CM

## 2019-07-24 DIAGNOSIS — F41.8 DEPRESSION WITH ANXIETY: ICD-10-CM

## 2019-07-24 DIAGNOSIS — Z72.0 TOBACCO USE: ICD-10-CM

## 2019-07-24 DIAGNOSIS — Z91.89 AT RISK FOR STROKE: ICD-10-CM

## 2019-07-24 LAB
BASOPHILS # BLD AUTO: 0.07 K/UL (ref 0–0.2)
BASOPHILS NFR BLD: 0.5 % (ref 0–1.9)
DIFFERENTIAL METHOD: ABNORMAL
EOSINOPHIL # BLD AUTO: 0.2 K/UL (ref 0–0.5)
EOSINOPHIL NFR BLD: 1.4 % (ref 0–8)
ERYTHROCYTE [DISTWIDTH] IN BLOOD BY AUTOMATED COUNT: 14.9 % (ref 11.5–14.5)
HCT VFR BLD AUTO: 37.1 % (ref 37–48.5)
HGB BLD-MCNC: 11.8 G/DL (ref 12–16)
IMM GRANULOCYTES # BLD AUTO: 0.06 K/UL (ref 0–0.04)
IMM GRANULOCYTES NFR BLD AUTO: 0.5 % (ref 0–0.5)
LYMPHOCYTES # BLD AUTO: 2.8 K/UL (ref 1–4.8)
LYMPHOCYTES NFR BLD: 21 % (ref 18–48)
MCH RBC QN AUTO: 25.7 PG (ref 27–31)
MCHC RBC AUTO-ENTMCNC: 31.8 G/DL (ref 32–36)
MCV RBC AUTO: 81 FL (ref 82–98)
MONOCYTES # BLD AUTO: 0.9 K/UL (ref 0.3–1)
MONOCYTES NFR BLD: 6.5 % (ref 4–15)
NEUTROPHILS # BLD AUTO: 9.2 K/UL (ref 1.8–7.7)
NEUTROPHILS NFR BLD: 70.1 % (ref 38–73)
NRBC BLD-RTO: 0 /100 WBC
PLATELET # BLD AUTO: 463 K/UL (ref 150–350)
PMV BLD AUTO: 9.1 FL (ref 9.2–12.9)
RBC # BLD AUTO: 4.6 M/UL (ref 4–5.4)
WBC # BLD AUTO: 13.15 K/UL (ref 3.9–12.7)

## 2019-07-24 PROCEDURE — 99214 PR OFFICE/OUTPT VISIT, EST, LEVL IV, 30-39 MIN: ICD-10-PCS | Mod: S$GLB,,, | Performed by: NURSE PRACTITIONER

## 2019-07-24 PROCEDURE — 99999 PR PBB SHADOW E&M-EST. PATIENT-LVL IV: CPT | Mod: PBBFAC,,, | Performed by: NURSE PRACTITIONER

## 2019-07-24 PROCEDURE — 3008F PR BODY MASS INDEX (BMI) DOCUMENTED: ICD-10-PCS | Mod: CPTII,S$GLB,, | Performed by: NURSE PRACTITIONER

## 2019-07-24 PROCEDURE — 99214 OFFICE O/P EST MOD 30 MIN: CPT | Mod: S$GLB,,, | Performed by: NURSE PRACTITIONER

## 2019-07-24 PROCEDURE — 85025 COMPLETE CBC W/AUTO DIFF WBC: CPT

## 2019-07-24 PROCEDURE — 3008F BODY MASS INDEX DOCD: CPT | Mod: CPTII,S$GLB,, | Performed by: NURSE PRACTITIONER

## 2019-07-24 PROCEDURE — 36415 COLL VENOUS BLD VENIPUNCTURE: CPT

## 2019-07-24 PROCEDURE — 99999 PR PBB SHADOW E&M-EST. PATIENT-LVL IV: ICD-10-PCS | Mod: PBBFAC,,, | Performed by: NURSE PRACTITIONER

## 2019-07-24 RX ORDER — ZONISAMIDE 25 MG/1
75 CAPSULE ORAL NIGHTLY
Qty: 42 CAPSULE | Refills: 0 | Status: SHIPPED | OUTPATIENT
Start: 2019-07-24 | End: 2019-08-28 | Stop reason: ALTCHOICE

## 2019-07-24 RX ORDER — IBUPROFEN 200 MG
200 TABLET ORAL DAILY PRN
Status: ON HOLD | COMMUNITY
End: 2024-03-07 | Stop reason: HOSPADM

## 2019-07-24 NOTE — PROGRESS NOTES
HPI: Sandra Castelan is a 33 y.o. female with MS, chronic migraine, and anxiety. She has hypothyroidism. She is a manager at a veterinary office.     She presents today with complaint of worsening headaches. Zonegran has been completely ineffective for her. Headaches occur daily, are located mid-frontally, and are throbbing in quality. They are constant. Severity ranges from 3-9/10. It is a 3 or 4/10 on most days. She is not taking any abortive therapy, as she failed Exedrin migraine, which caused GI upset. She will take Ibuprofen occasionally, which helps minimally. + photophobia with more severe headaches, + phonophobia most of the time. No nausea or vomiting. No visual complaints. No weakness, dizziness, autonomic complaints.     She does not always sleep well at night. She sometimes wakes to use the restroom or wakes at night and thinks about things. She does worry chronically, and does feel overwhelmed at times.     She is followed by Psychiatry.     Repeat CBC done in 3/2019 showed ongoing mildly elevated platelets.     No new visual complaints or MS type complaints to report. She is compliant with Copaxone.     Review of Systems   Constitutional: Negative for fever.   HENT: Negative for nosebleeds.    Eyes: Negative for double vision.   Respiratory: Negative for hemoptysis.    Cardiovascular: Negative for leg swelling.   Gastrointestinal: Negative for blood in stool.   Genitourinary: Negative for hematuria.   Musculoskeletal: Negative for falls.   Skin: Negative for rash.   Neurological: Positive for headaches. Negative for focal weakness.   Psychiatric/Behavioral: The patient is nervous/anxious and has insomnia.        I have reviewed all of this patient's past medical and surgical histories as well as family and social histories and active allergies and medications as documented in the electronic medical record.    Exam:  Gen Appearance, well developed/nourished in no apparent distress  CV: 2+ distal pulses  with no edema or swelling  Neuro:  MS: Awake, alert, oriented to place, person, time, situation. Sustains attention. Recent/remote memory intact, Language is full to spontaneous speech/comprehension. Fund of Knowledge is full  CN: Optic discs are flat with normal vasculature, PERRL, Extraoccular movements and visual fields are full. Normal facial sensation and strength, Hearing symmetric, Tongue and Palate are midline and strong. Shoulder Shrug symmetric and strong.  Motor: Normal bulk, tone, no abnormal movements. 5/5 strength bilateral upper/lower extremities with 2+ reflexes and no clonus  Sensory: symmetric to light touch, pain, temp, and vibration,  Romberg negative  Cerebellar: Finger-nose,Heal-shin, Rapid alternating movements intact  Gait: Normal stance, no ataxia  MSK survey: bilateral trap and occipital tenderness    Imagin18 MRI brain: Multiple areas of increased white matter signal in a pattern suggestive of multiple sclerosis, measuring up to 1 cm in the right frontal periventricular region.  Nothing enhancing    18 MRI C spine: 1. No evidence demyelinating process within the cervical cord.  2. Mild disc bulges as above from C3-6, greatest at C5-6.    18 MRI T spine: The thoracic spine demonstrates a normal kyphosis.  There is disc desiccation and loss of disc height from T6-9.  There is a left paracentral disc herniation at T8-9 with contact and mild deformity of the left paracentral cord.  No other focal disc protrusion is noted.  No abnormal cord signal is seen.  No abnormal enhancement is seen within the thoracic cord.  Vertebral body marrow signal is unremarkable.     Labs: 2018 Lyme titers, FRENCH, hep panel, HiV, RPRP Ace-level, SSA/SSB antibodies, ESR, and paraneoplastic panel, CMP, CBC unremarkable  Vit D 39  CBC 2018:  white blood cell count is mildly elevated, the platelet count is still elevated, and there is mild anemia.     CMP normal 10/2018  Vit D normal  10/2018    Assessment/Plan: Sandra Castelan is a 33 y.o. female diagnosed with optic neuritis in her right eye in 6/2018 by OCT. Subsequent MRI brain 7/2018 shows multiple chronic demyelinating plaques concerning for Multiple Sclerosis. She has noted some left arm and leg numbness for years, dizziness, fatigue, mood disorder, and urinary symptoms. Imaging and cluster of symptoms are consistent with relapsing and remitting MS given her lesions and symptoms   by time and space    I recommend:   1. Start Botox per migraine protocol. Wean Zonegran at this time, as she has failed this. TCA's cannot be used given her concurrent use of two other antidepressant medications and this may worsen her obesity. She is already taking an antidepressant agent. Aimovig can be considered should she fail this.   2. Repeat CBC, given thrombocytosis, which is mild. This can be explained by her smoking; however. PCP did follow her mildly elevated  white blood cell count, and there is mild anemia not clearly explained by Copaxone use  3. Advised to see Psychiatry for ongoing anxiety, which is likely contributing to insomnia. Advised to try Melatonin prn for insomnia. Would avoid TCA's in this patient, given her concurrent use of Wellbutrin and Lexapro and her obesity.   4. Repeat MRI Brain per MS protocol at this time for surveillance.   5. She was not given IV steroids at the time of acute ON, but symptoms improved. Labs for MS mimics were normal  6. Continue Vit Supplementation for Vit D deficiency  7. She is treated for MS with Copaxone and is tolerating well. LP was not done as her symptoms are rather pathognomonic. Teratogenicity risks of this med reviewed.  8. Treatment with psychiatry helped her anxiety and fatigue  9. Urology consulted prior for dribbling urine at times, and she is considering this soon.   10. Smoking cessation is advised to prevent CVA. She has multiple risk factors for CVA including tobacco use, HTN, and  birth control use. Birth control pills should not be taken with tobacco use. Discussed elevated platelets and increased risk of thrombotic events with this. Note: she is taking Wellbutrin without effect on her smoking status, though this is taken for anxiety and depression.     RTC 6 months

## 2019-07-31 ENCOUNTER — TELEPHONE (OUTPATIENT)
Dept: NEUROLOGY | Facility: CLINIC | Age: 34
End: 2019-07-31

## 2019-07-31 ENCOUNTER — HOSPITAL ENCOUNTER (OUTPATIENT)
Dept: RADIOLOGY | Facility: HOSPITAL | Age: 34
Discharge: HOME | End: 2019-07-31
Attending: NURSE PRACTITIONER
Payer: COMMERCIAL

## 2019-07-31 DIAGNOSIS — G35 MULTIPLE SCLEROSIS: ICD-10-CM

## 2019-07-31 DIAGNOSIS — G35 MS (MULTIPLE SCLEROSIS): Primary | ICD-10-CM

## 2019-07-31 PROCEDURE — 70551 MRI BRAIN STEM W/O DYE: CPT | Mod: TC

## 2019-07-31 PROCEDURE — 70551 MRI BRAIN STEM W/O DYE: CPT | Mod: 26,,, | Performed by: RADIOLOGY

## 2019-07-31 PROCEDURE — 70551 MRI BRAIN DEMYELINATING WITHOUT CONTRAST: ICD-10-PCS | Mod: 26,,, | Performed by: RADIOLOGY

## 2019-08-07 ENCOUNTER — PROCEDURE VISIT (OUTPATIENT)
Dept: NEUROLOGY | Facility: CLINIC | Age: 34
End: 2019-08-07
Payer: COMMERCIAL

## 2019-08-07 PROCEDURE — 64615 PR CHEMODENERVATION OF MUSCLE FOR CHRONIC MIGRAINE: ICD-10-PCS | Mod: S$GLB,,, | Performed by: NURSE PRACTITIONER

## 2019-08-07 PROCEDURE — 64615 CHEMODENERV MUSC MIGRAINE: CPT | Mod: S$GLB,,, | Performed by: NURSE PRACTITIONER

## 2019-08-07 NOTE — PROCEDURES
Procedures BOTOX PROCEDURE NOTE     Date of Procedure: 08/07/2019      Reason for Proceedure: Chronic Migraine       Informed consent was obtained prior to performing this study. Two patient identifiers were confirmed with the patient prior to performing this study. A time out to determine correct patient and and agreement on procedure performed was conducted prior to the injections.     Procedure Details: After informed consent obtained the patient's head and upper neck was cleansed with alcohol rub and 155 Units of Botox (diluted 1:1) was injected in the following bilateral muscles:   10 units in corregator* (over 2 sites), 5 units in Procerus, 20 units Frontalis* (over 4 sites), 40 units in Temporalis* (over 4 sites), 30 units in occipitalis* (over 6 sites), 20 units in the cervical paraspinal muscles* (over 4 sites), and 30 units in Trapezius* (over 4 sites). The remaining 45 units were wasted to follow recommended guidelines for treatment of chonic migraines with Botox   *= denotes bilateral injections    The patient tolerated the procedure well with no more than 1cc of blood loss. He was observed for several minutes post injection and given a handout from [facility] regarding when and where to seek help if side

## 2019-08-21 DIAGNOSIS — G35 MULTIPLE SCLEROSIS: ICD-10-CM

## 2019-08-22 RX ORDER — GLATIRAMER 40 MG/ML
40 INJECTION, SOLUTION SUBCUTANEOUS
Qty: 12 ML | Refills: 12 | Status: SHIPPED | OUTPATIENT
Start: 2019-08-23 | End: 2020-08-19

## 2019-08-22 NOTE — TELEPHONE ENCOUNTER
----- Message from Tara Lebron sent at 2019  2:51 PM CDT -----  Contact: [facility]  aSndra Castelan  MRN: [id number]  : 1985  PCP: Shahida Castillo  Home Phone      [phone]  Work Phone      Not on file.  Mobile          [phone]      MESSAGE:   Pt requesting refill or new Rx.   Is this a refill or new RX:  refill  RX name and strength: glatiramer (COPAXONE) 40 mg/mL Syrg injection  Last office visit: 2019  Pharmacy name and location:  [facility]  Comments:  Requests this message is sent back as urgent    Phone:  [phone]

## 2019-08-23 ENCOUNTER — TELEPHONE (OUTPATIENT)
Dept: PHARMACY | Facility: CLINIC | Age: 34
End: 2019-08-23

## 2019-08-28 ENCOUNTER — OFFICE VISIT (OUTPATIENT)
Dept: NEUROLOGY | Facility: CLINIC | Age: 34
End: 2019-08-28
Payer: COMMERCIAL

## 2019-08-28 ENCOUNTER — PATIENT MESSAGE (OUTPATIENT)
Dept: NEUROLOGY | Facility: CLINIC | Age: 34
End: 2019-08-28

## 2019-08-28 VITALS — BODY MASS INDEX: 54.68 KG/M2 | HEIGHT: 59 IN | WEIGHT: 271.25 LBS

## 2019-08-28 DIAGNOSIS — Z71.89 COUNSELING REGARDING GOALS OF CARE: ICD-10-CM

## 2019-08-28 DIAGNOSIS — N31.9 NEUROGENIC BLADDER: ICD-10-CM

## 2019-08-28 DIAGNOSIS — Z29.89 PROPHYLACTIC IMMUNOTHERAPY: ICD-10-CM

## 2019-08-28 DIAGNOSIS — R90.89 ABNORMAL FINDING ON MRI OF BRAIN: ICD-10-CM

## 2019-08-28 DIAGNOSIS — G56.00 CARPAL TUNNEL SYNDROME, UNSPECIFIED LATERALITY: ICD-10-CM

## 2019-08-28 DIAGNOSIS — G35 MS (MULTIPLE SCLEROSIS): Primary | ICD-10-CM

## 2019-08-28 PROCEDURE — 99215 OFFICE O/P EST HI 40 MIN: CPT | Mod: S$GLB,,, | Performed by: PSYCHIATRY & NEUROLOGY

## 2019-08-28 PROCEDURE — 99354 PR PROLONGED SVC, OUPT, 1ST HR: CPT | Mod: S$GLB,,, | Performed by: PSYCHIATRY & NEUROLOGY

## 2019-08-28 PROCEDURE — 3008F BODY MASS INDEX DOCD: CPT | Mod: CPTII,S$GLB,, | Performed by: PSYCHIATRY & NEUROLOGY

## 2019-08-28 PROCEDURE — 99354 PR PROLONGED SVC, OUPT, 1ST HR: ICD-10-PCS | Mod: S$GLB,,, | Performed by: PSYCHIATRY & NEUROLOGY

## 2019-08-28 PROCEDURE — 99215 PR OFFICE/OUTPT VISIT, EST, LEVL V, 40-54 MIN: ICD-10-PCS | Mod: S$GLB,,, | Performed by: PSYCHIATRY & NEUROLOGY

## 2019-08-28 PROCEDURE — 3008F PR BODY MASS INDEX (BMI) DOCUMENTED: ICD-10-PCS | Mod: CPTII,S$GLB,, | Performed by: PSYCHIATRY & NEUROLOGY

## 2019-08-28 PROCEDURE — 99999 PR PBB SHADOW E&M-EST. PATIENT-LVL II: ICD-10-PCS | Mod: PBBFAC,,, | Performed by: PSYCHIATRY & NEUROLOGY

## 2019-08-28 PROCEDURE — 99999 PR PBB SHADOW E&M-EST. PATIENT-LVL II: CPT | Mod: PBBFAC,,, | Performed by: PSYCHIATRY & NEUROLOGY

## 2019-08-28 NOTE — PROGRESS NOTES
Neurology Consultation    History of present illness:   Ms Castelan is a 34 y/o woman referred by Dr. Kareem Lyn for second opinion on MS treatment. She is accompanied by her .     She explains that she was diagnosed with MS summer of 2018.  She developed pain and decreased vision OD in June 2018.  She was in [place] (traveling) and saw local ophtho who diagnosed ON based on her eye exam.  She deferred hospitalization in [place], but saw PCP in short order once she returned home to [place].  Her PCP did MRI brain which showed lesions typical of MS.      She states vision OD was extremely blurry and impaired, but not dark per se.  Eye pain improved after 2-3 weeks.  Vision remained blurry x 4 weeks then started to improve spontaneously, and regained vision quickly after that, and at 6 weeks had about 90% of vision back. Did not receive steroids.      Pt also reports history of extreme fatigue, and urinary frequency.     PCP referred her to Dr. Lyn who diagnosed her with MS.  No LP necessary. MRIs of C and T spine were done which were stable. She was started on Copaxone.  She states she tolerates Copaxone, and is taking it as prescribed.  She also takes 5,000 IU of vitamin D3 daily.  She smokes 1/3 pack of cigarettes / day.     She does have pain with left handgrip at times.  Has musclular leg pain at times.  Feels balance is not as good as it used to be.  She has numbness in feet and hands that are intermittent, but does not last for long.      She feels that urinary frequency/urgency and bowel frequency/urgency is getting worse;  She has incomplete emptying.  She has some anxiety and depression.  Heat intolerance has also worsened.     Review of systems:   A complete 15 system ROS was completed by the patient, reviewed by me and will uploaded into the EHR.       Past Medical History:   Diagnosis Date    Anemia     Anxiety     Anxiety     Back pain     Bleeds easily     Change in bowel habit      Changes in skin texture     Confusion     Depression     Depression     Dizziness     Dry skin     Fatigue     Has daytime drowsiness     Headache     Heartburn     History of weakness of extremity     HTN (hypertension)     Hx of psychiatric care     Hyperthyroidism     Insomnia     Lethargy     Loose stools     Memory loss     Migraine     MS (multiple sclerosis)     Neck pain     Neuropathy     Numbness of extremity     Optic neuritis     PCOS (polycystic ovarian syndrome)     Psychiatric problem     Rash     Restless sleeper     Stomach pain     Therapy     Thyroid disease     Thyroid disease     Tingling in extremities     Tiredness     Trouble walking     Unsteady gait     Urine troubles     Voiding dysfunction     Wheezing     Worries        History reviewed. No pertinent surgical history.    Family History   Problem Relation Age of Onset    Depression Mother     Hyperthyroidism Mother     Hypertension Father        Social History     Socioeconomic History    Marital status:      Spouse name: Not on file    Number of children: Not on file    Years of education: Not on file    Highest education level: Not on file   Occupational History    Not on file   Social Needs    Financial resource strain: Not on file    Food insecurity:     Worry: Not on file     Inability: Not on file    Transportation needs:     Medical: Not on file     Non-medical: Not on file   Tobacco Use    Smoking status: Current Every Day Smoker     Packs/day: 0.25     Years: 12.00     Pack years: 3.00     Types: Cigarettes    Smokeless tobacco: Never Used   Substance and Sexual Activity    Alcohol use: No    Drug use: Never    Sexual activity: Yes   Lifestyle    Physical activity:     Days per week: Not on file     Minutes per session: Not on file    Stress: Not on file   Relationships    Social connections:     Talks on phone: Not on file     Gets together: Not on file      "Attends Gnosticist service: Not on file     Active member of club or organization: Not on file     Attends meetings of clubs or organizations: Not on file     Relationship status: Not on file   Other Topics Concern    Patient feels they ought to cut down on drinking/drug use Not Asked    Patient annoyed by others criticizing their drinking/drug use Not Asked    Patient has felt bad or guilty about drinking/drug use Not Asked    Patient has had a drink/used drugs as an eye opener in the AM Not Asked   Social History Narrative    Not on file       MEDS: reviewed    Review of patient's allergies indicates:  No Known Allergies      Physical Exam  25 foot timed walk: 4.4s without assist;     Vitals:    08/28/19 1059   Weight: 123.1 kg (271 lb 4.4 oz)   Height: 4' 11" (1.499 m)       In general, the patient is well nourished.    No bruits.  Disc pallor OD, normal disc OS.     MENTAL STATUS: language is fluent, normal verbal comprehension, short-term and remote memory is intact, attention is normal, patient is alert and oriented x 3, fund of knowlege is appropriate by vocabulary.     CRANIAL NERVE EXAM:  There is no NKECHI.  Right APD;  14/18 color plates OD, 18/18 color plates OS.  Extraocular muscles are intact. Pupils are equal, round, and reactive to light. No facial asymmetry. Facial sensation is intact bilaterally. There is no dysarthria. Uvula is midline, and palate moves symmetrically. Shoulder shrug intact bilaterlly. Tongue protrusion is midline. Hearing is grossly intact. Neck is supple.     MOTOR EXAM: Normal bulk and tone throughout UE and LE bilaterally.   No pronator drift; rapid sequential movements are normal; Strength is  5/5 in all groups in the lower extremities and upper extremities;    REFLEXES: 2+ and symmetric throughout in all four extremeties; toes are down bilaterally    SENSORY EXAM: Normal to vibration t/u    COORDINATION: Normal finger-to-nose exam     GAIT: Narrow based and stable    + " Phalan's sign left wrist, + Tinnel's sign left wrist;         IMAGING (personally reviewed):     MRI Brain 7/31/2019 was compared to MRI brain from 7/2018 (done at time of diagnosis) showed one new lesions 1.2cm in size right parietal lobe. No ani given in 2019 scan.       Results for orders placed during the hospital encounter of 07/24/18   MRI Brain W WO Contrast    Impression Multiple areas of increased white matter signal in a pattern suggestive of multiple sclerosis, measuring up to 1 cm in the right frontal periventricular region.      Electronically signed by: Theodore Pollock MD  Date:    07/24/2018  Time:    16:06     Results for orders placed during the hospital encounter of 07/24/18   MRI Cervical Spine W WO Cont    Impression 1. No evidence demyelinating process within the cervical cord.  2. Mild disc bulges as above from C3-6, greatest at C5-6.      Electronically signed by: Theodore Pollock MD  Date:    07/24/2018  Time:    16:14     Results for orders placed during the hospital encounter of 07/24/18   MRI Thoracic Spine W WO Contrast    Addendum  Initial report inadvertently attached the brain study to this exam.  The  following is the thoracic spine report.  MRI of the thoracic spine was performed in multiple planes and sequences.   Pre and post IV contrast sequences were included.  The thoracic spine demonstrates a normal kyphosis.  There is disc  desiccation and loss of disc height from T6-9.  There is a left  paracentral disc herniation at T8-9 with contact and mild deformity of the  left paracentral cord.  No other focal disc protrusion is noted.  No  abnormal cord signal is seen.  No abnormal enhancement is seen within the  thoracic cord.  Vertebral body marrow signal is unremarkable.       Theodore Pollock MD 7/24/2018  4:12 PM          Impression Multiple areas of increased white matter signal in a pattern suggestive of multiple sclerosis, measuring up to 1 cm in the right frontal periventricular  region.      Electronically signed by: Theodore Pollock MD  Date:    07/24/2018  Time:    16:01       LABS:  Lab Results   Component Value Date    WBC 13.15 (H) 07/24/2019    HGB 11.8 (L) 07/24/2019    HCT 37.1 07/24/2019    MCV 81 (L) 07/24/2019     (H) 07/24/2019     CMP  Sodium   Date Value Ref Range Status   08/03/2018 139 136 - 145 mmol/L Final     Potassium   Date Value Ref Range Status   08/03/2018 4.3 3.5 - 5.1 mmol/L Final     Chloride   Date Value Ref Range Status   08/03/2018 106 95 - 110 mmol/L Final     CO2   Date Value Ref Range Status   08/03/2018 23 23 - 29 mmol/L Final     Glucose   Date Value Ref Range Status   08/03/2018 86 70 - 110 mg/dL Final     BUN, Bld   Date Value Ref Range Status   08/03/2018 13 6 - 20 mg/dL Final     Creatinine   Date Value Ref Range Status   08/03/2018 0.7 0.5 - 1.4 mg/dL Final     Calcium   Date Value Ref Range Status   08/03/2018 9.3 8.7 - 10.5 mg/dL Final     Total Protein   Date Value Ref Range Status   08/03/2018 7.3 6.0 - 8.4 g/dL Final     Albumin   Date Value Ref Range Status   08/03/2018 3.4 (L) 3.5 - 5.2 g/dL Final     Total Bilirubin   Date Value Ref Range Status   08/03/2018 0.3 0.1 - 1.0 mg/dL Final     Comment:     For infants and newborns, interpretation of results should be based  on gestational age, weight and in agreement with clinical  observations.  Premature Infant recommended reference ranges:  Up to 24 hours.............<8.0 mg/dL  Up to 48 hours............<12.0 mg/dL  3-5 days..................<15.0 mg/dL  6-29 days.................<15.0 mg/dL       Alkaline Phosphatase   Date Value Ref Range Status   08/03/2018 88 55 - 135 U/L Final     AST   Date Value Ref Range Status   08/03/2018 15 10 - 40 U/L Final     ALT   Date Value Ref Range Status   08/03/2018 16 10 - 44 U/L Final     Anion Gap   Date Value Ref Range Status   08/03/2018 10 8 - 16 mmol/L Final     eGFR if    Date Value Ref Range Status   08/03/2018 >60.0 >60  mL/min/1.73 m^2 Final     eGFR if non    Date Value Ref Range Status   08/03/2018 >60.0 >60 mL/min/1.73 m^2 Final     Comment:     Calculation used to obtain the estimated glomerular filtration  rate (eGFR) is the CKD-EPI equation.                 ASSESSMENT:        1.   MS   2. CTS            DISCUSSION:   I agree with MS diagnosis.  The fact that she developed new lesion on recent brain MRI does not mean she has failed Copaxone as it takes several months for this medication to exert full effect;  This lesion may have formed in fall of 2018 (we just don't know when it formed).  Rather than chance DMT, would recommend the following:        RECOMMENDATIONS:  1. Repeat MRI brain demyelinating contrast only sequences, repeat MRI C and T demyelinating with and without;    If ani + lesions anywhere, would advance DMT.  If new lesions in spine, would repeat in 6 mo         2.  Agree with urologist--need to address nocturia given her fatigue           3.  If fatigue continues, consider sleep study.    4. Neutral posture wrist splints                         MS (multiple sclerosis)    Abnormal finding on MRI of brain    Counseling regarding goals of care    Pt plans to follow up with Dr Lyn     Our visit today lasted 90 minutes, and 100% of this time was spent face to face with the patient. Over 50% of this visit included discussion of the treatment plan/medication changes/symptom management/exam findings/imaging results/coordination of care. The patient agrees with the plan of care.           Thank you for the referral      Nel Campos MD, MPH

## 2019-09-03 ENCOUNTER — PATIENT MESSAGE (OUTPATIENT)
Dept: NEUROLOGY | Facility: CLINIC | Age: 34
End: 2019-09-03

## 2019-09-03 PROBLEM — G56.00 CARPAL TUNNEL SYNDROME: Status: ACTIVE | Noted: 2019-09-03

## 2019-09-03 PROBLEM — R90.89 ABNORMAL FINDING ON MRI OF BRAIN: Status: ACTIVE | Noted: 2019-09-03

## 2019-09-08 ENCOUNTER — PATIENT MESSAGE (OUTPATIENT)
Dept: NEUROLOGY | Facility: CLINIC | Age: 34
End: 2019-09-08

## 2019-09-09 ENCOUNTER — PATIENT MESSAGE (OUTPATIENT)
Dept: NEUROLOGY | Facility: CLINIC | Age: 34
End: 2019-09-09

## 2019-09-17 ENCOUNTER — INITIAL CONSULT (OUTPATIENT)
Dept: UROGYNECOLOGY | Facility: CLINIC | Age: 34
End: 2019-09-17
Attending: PSYCHIATRY & NEUROLOGY
Payer: COMMERCIAL

## 2019-09-17 VITALS
BODY MASS INDEX: 55.6 KG/M2 | DIASTOLIC BLOOD PRESSURE: 74 MMHG | SYSTOLIC BLOOD PRESSURE: 122 MMHG | WEIGHT: 275.81 LBS | HEIGHT: 59 IN

## 2019-09-17 DIAGNOSIS — N39.46 MIXED INCONTINENCE: ICD-10-CM

## 2019-09-17 DIAGNOSIS — G35 MS (MULTIPLE SCLEROSIS): Primary | ICD-10-CM

## 2019-09-17 PROCEDURE — 99245 PR OFFICE CONSULTATION,LEVEL V: ICD-10-PCS | Mod: S$GLB,,, | Performed by: OBSTETRICS & GYNECOLOGY

## 2019-09-17 PROCEDURE — 99999 PR PBB SHADOW E&M-EST. PATIENT-LVL IV: ICD-10-PCS | Mod: PBBFAC,,, | Performed by: OBSTETRICS & GYNECOLOGY

## 2019-09-17 PROCEDURE — 99245 OFF/OP CONSLTJ NEW/EST HI 55: CPT | Mod: S$GLB,,, | Performed by: OBSTETRICS & GYNECOLOGY

## 2019-09-17 PROCEDURE — 87086 URINE CULTURE/COLONY COUNT: CPT

## 2019-09-17 PROCEDURE — 99999 PR PBB SHADOW E&M-EST. PATIENT-LVL IV: CPT | Mod: PBBFAC,,, | Performed by: OBSTETRICS & GYNECOLOGY

## 2019-09-17 RX ORDER — OXYBUTYNIN CHLORIDE 5 MG/1
5 TABLET, EXTENDED RELEASE ORAL DAILY
Qty: 30 TABLET | Refills: 11 | Status: SHIPPED | OUTPATIENT
Start: 2019-09-17 | End: 2019-12-19 | Stop reason: SDUPTHER

## 2019-09-17 RX ORDER — ESCITALOPRAM OXALATE 20 MG/1
20 TABLET ORAL EVERY MORNING
Refills: 5 | COMMUNITY
Start: 2019-08-28

## 2019-09-17 NOTE — LETTER
September 22, 2019      Nel Campos MD  [address]  [facility] [address]           [facility] [address]   [address] [place] [id number]  Phone: [phone]          Patient: Sandra Castelan   MR Number: [id number]   YOB: 1985   Date of Visit: 9/17/2019       Dear Dr. Nel Campos:    Thank you for referring Sandra Castelan to me for evaluation. Attached you will find relevant portions of my assessment and plan of care.    If you have questions, please do not hesitate to call me. I look forward to following Sandra Castelan along with you.    Sincerely,    Pa Campbell, DO [place]  CC:  No Recipients    If you would like to receive this communication electronically, please contact [email or web address] or [phone] to request more information on [employer] access.    For providers and/or their staff who would like to refer a patient to [facility], please contact us through our one-stop-shop provider referral line, [facility] , at [phone].    If you feel you have received this communication in error or would no longer like to receive these types of communications, please e-mail [email or web address]

## 2019-09-17 NOTE — PATIENT INSTRUCTIONS
1. Hx of MS:   We reviewed that bladder dysfunction can be seen in the majority of patients with MS. The symptoms range from not being able to emptying to urinary frequency and urinary incontinence.  This may be as a results of  block or delay transmission of nerve signals in areas of the central nervous system (CNS)  that control the bladder and urinary sphincters due to the MS brain lesions. A spastic (overactive) bladder that is unable to hold the normal amount of urine, or a bladder that does not empty properly (retains some urine in it). Many symptoms are associated with MS vary and sometimes need additional testing to better evaluate these urinary problems.    Frequency and/or urgency of urination   Hesitancy in starting urination   Frequent nighttime urination (nocturia)   Incontinence (the inability to hold in urine)   Inability to empty the bladder completely    2.   Mixed urinary incontinence, urge = stress:   --Bladder diary for 3-7 days, write the number of times you go to the rest room and associated symptoms of urgency or leakage.   --Empty bladder every 3 hours.  Empty well: wait a minute, lean forward on toilet.    --Avoid dietary irritants (see sheet).  Keep diary x 3-5 days to determine your irritants.  --KEGELS: do 10 in AM and 10 in PM, holding each x 10 seconds.  When you feel urge to go, STOP, KEGEL, and when urge has passed, then go to bathroom.  Start pelvic floor PT   --URGE: Ditropan 5  mg daily .  SE profile reviewed.  Takes 2-4 weeks to see if will have effect.  For dry mouth: get sour, sugar free lozenge or gum.    --STRESS:  Pessary vs. Sling vs impressa     3. --Nocturia (nighttime urination): stop fluids 2 hours before bed.  If have leg swelling:  Elevate feet above chest x 1 hour before bed to get excess fluid off.  Can also use support hose (knee highs).

## 2019-09-17 NOTE — PROGRESS NOTES
Subjective:       Patient ID: Sandra Castelan is a 33 y.o. female.    Chief Complaint:  Multiple Sclerosis; Urinary Frequency; and Urinary Incontinence      History of Present Illness  HPI 33 Y OF  has a past medical history of Anemia, Anxiety, Anxiety, Back pain, Bleeds easily, Change in bowel habit, Changes in skin texture, Confusion, Depression, Depression, Dizziness, Dry skin, Fatigue, Has daytime drowsiness, Headache, Heartburn, History of weakness of extremity, HTN (hypertension), psychiatric care, Hyperthyroidism, Insomnia, Lethargy, Loose stools, Memory loss, Migraine, MS (multiple sclerosis), Neck pain, Neuropathy, Numbness of extremity, Optic neuritis, PCOS (polycystic ovarian syndrome), Psychiatric problem, Rash, Restless sleeper, Stomach pain, Therapy, Thyroid disease, Thyroid disease, Tingling in extremities, Tiredness, Trouble walking, Unsteady gait, Urine troubles, Voiding dysfunction, Wheezing, and Worries. referred by Dr. Campos for evaluation of urinary urgency and urge incontinence.     [facility] H[facility]     Question 9/15/2019  7:17 PM CDT - Filed by Patient on 9/15/2019   General Urogynecology: Are you experiencing the following?    Dysuria (painful urination) No   Nocturia:  waking up at night to empty your bladder  Yes   If you answered yes to the previous question, how many times does this happen per night? 1-2   Enuresis (urine loss during sleep) Yes   Dribbling urine after you urinate Yes   Hematuria (urine appears red) No   Type of stream Strong   Urinary frequency: How often a day are you going to the bathroom per day?  10-15   Urinary Tract Infections: How many Urinary Tract Infections have you had in the past year? I have not had a UTI in the past year   If you have had a UTI in the past year, what treatments have you had so far?  I have not had a UTI in the past year   Urinary Incontinence (General): Are you experiencing the following?    Past consultation for incontinence: Have  "you ever seen someone for the evaluation of incontinence? No   If you answered yes to the previous question, please select all the therapies you have tried.  N/a- I answered no to the previous question    None of the above   Please note the effectiveness of the therapies.    Need to wear protection to keep clothes dry  Yes   If you answered yes to the previous question, please ck the protection you use.  Pads   If you wear protection, how much wetness is typically on each pad? Light   If you wear protection, how often do you have to change per day, if applicable?  1   Stress Symptoms: Are you experiencing the following?    Leakage of urine with cough, laugh and/or sneeze Yes   If you answered yes to the previous question, what is the frequency in days, weeks and/or months? Daily   Leakage of urine with sex Yes   Leakage of urine with bending/ lifting Yes   Leakage of urine with briskly walking or jogging Yes   If you lose urine for any other reason not previously mentioned, please note it below, if applicable.     Urge Symptoms: Are you experiencing the following?    Urgency ("got to go" feeling) Yes   Urge: How frequently do you feel an urge to urinate (feeling like you "gotta go" to the bathroom and can't wait) Several times a day   Do you experience a leakage of urine when you have a feeling of urgency?  Yes   Leakage of urine when unaware Yes   Past use of anticholinergics (medications used to treat overactive bladder) No   If you answered yes to the previous question, please ck the anticholinergics you have used:     Have you ever used Mirbetriq (aka Mirabegron)?  No   Prolapse Symptoms: Are you experiencing any of the following?     Falling out/ Bulging/ Heaviness in the vagina No   Vaginal/ Abdominal Pain/ Pressure Yes   Need to strain/ Push to void Yes   Need to wait on the toilet before you void Yes   Unusual position to urinate (using your hands to push back the vaginal bulge) Yes   Sensation of " incomplete emptying Yes   Past use of pessary device No   If you answered yes to the previous question, please list the devices you have used below.     Bowel Symptoms: Are you experiencing any of the following?    Constipation Yes   Diarrhea  Yes   Hematochezia (bloody stool) No   Incomplete evacuation of stool Yes   Involuntary loss of formed stool Yes   Fecal smearing/urgency Yes   Involuntary loss of gas Yes   Vaginal Symptoms: Are you experiencing any of the following?     Abnormal vaginal bleeding  No   Vaginal dryness No   Sexually active  Yes   Dyspareunia (painful intercourse) No   Estrogen use  Yes       GYN & OB History  No LMP recorded. Patient is pregnant.   Date of Last Pap: 2018    OB History    Para Term  AB Living   1             SAB TAB Ectopic Multiple Live Births                  # Outcome Date GA Lbr Fili/2nd Weight Sex Delivery Anes PTL Lv   1 Current              OB  Largest: None   Forceps:  Episiotomy:      GYN  Menarche: 12  Menstrual cycle: -5/moderate / cramping   Menopause: n/a  Hysterectomy: No   Ovaries: present     Review of Systems  Review of Systems   Constitutional: Negative.  Negative for activity change, appetite change, chills, diaphoresis, fatigue, fever and unexpected weight change.   HENT: Negative.    Eyes: Negative.    Respiratory: Negative.  Negative for apnea, cough and wheezing.    Cardiovascular: Negative.  Negative for chest pain and palpitations.   Gastrointestinal: Negative for abdominal distention, abdominal pain, anal bleeding, blood in stool, constipation, diarrhea, nausea, rectal pain and vomiting.   Endocrine: Negative.    Genitourinary: Positive for frequency and urgency. Negative for decreased urine volume, difficulty urinating, dyspareunia, dysuria, enuresis, flank pain, genital sores, hematuria, menstrual problem, pelvic pain, vaginal bleeding, vaginal discharge and vaginal pain.   Musculoskeletal: Negative for back pain and gait  problem.   Skin: Negative for color change, pallor, rash and wound.   Allergic/Immunologic: Negative for immunocompromised state.   Neurological: Negative.  Negative for dizziness and speech difficulty.   Hematological: Negative for adenopathy.   Psychiatric/Behavioral: Negative for agitation, behavioral problems, confusion and sleep disturbance.           Objective:     Physical Exam   Constitutional: She is oriented to person, place, and time. She appears well-developed.   HENT:   Head: Normocephalic and atraumatic.   Eyes: Conjunctivae and EOM are normal.   Neck: Normal range of motion. Neck supple.   Cardiovascular: Normal rate, regular rhythm, S1 normal, S2 normal, normal heart sounds and intact distal pulses.   Pulmonary/Chest: Effort normal and breath sounds normal. She exhibits no tenderness.   Abdominal: Soft. Bowel sounds are normal. She exhibits no distension and no mass. There is no hepatosplenomegaly, splenomegaly or hepatomegaly. There is no tenderness. There is no rigidity, no rebound, no guarding and no CVA tenderness. No hernia.   Genitourinary: Pelvic exam was performed with patient supine. Rectum normal, vagina normal, uterus normal, cervix normal, skenes normal and bartholins normal. Right labia normal and left labia normal. Urethra exhibits hypermobility. Urethra exhibits no urethral caruncle, no urethral diverticulum and no urethral mass. Right bartholin is not enlarged and not tender. Left bartholin is not enlarged and not tender. Rectal exam shows resting tone normal and active tone normal. Rectal exam shows no external hemorrhoid, no fissure, no tenderness, anal tone normal and no dovetailing. Guaiac negative stool. There is atrophy in the vagina. No foreign body, tenderness, bleeding, unspecified prolapse of vaginal walls, fistula, mesh exposure or lavator tenderness in the vagina. No vaginal discharge found. Right adnexum displays no mass and no tenderness. Left adnexum displays no mass  and no tenderness. Cervix exhibits no motion tenderness and no discharge. Uterus is not tender and not experiencing uterine prolapse.   PVR: 50 ML  Empty cough stress test: Negative.  Kegel: 3/5    POP-Q  Aa: -2 Ba: -2 C: -5   GH: 3 PB: 2 TVL: 8   Ap: -2 Bp: -2 D: -6                     Musculoskeletal: Normal range of motion.   Lymphadenopathy:     She has no axillary adenopathy.        Right: No inguinal adenopathy present.        Left: No inguinal adenopathy present.   Neurological: She is alert and oriented to person, place, and time. She has normal strength and normal reflexes. Cranial nerves II through XII intact. No cranial nerve deficit.   Skin: Skin is warm, dry and intact.   Psychiatric: She has a normal mood and affect. Her speech is normal and behavior is normal. Judgment normal. Cognition and memory are normal.           HCM  Pap's: normal,   Mammo: n/a  Colonoscopy: no  Dexa:no        Assessment:        1. MS (multiple sclerosis)    2. Mixed incontinence             Plan:      1. Hx of MS:   We reviewed that bladder dysfunction can be seen in the majority of patients with MS. The symptoms range from not being able to emptying to urinary frequency and urinary incontinence.  This may be as a results of  block or delay transmission of nerve signals in areas of the central nervous system (CNS)  that control the bladder and urinary sphincters due to the MS brain lesions. A spastic (overactive) bladder that is unable to hold the normal amount of urine, or a bladder that does not empty properly (retains some urine in it). Many symptoms are associated with MS vary and sometimes need additional testing to better evaluate these urinary problems.    Frequency and/or urgency of urination   Hesitancy in starting urination   Frequent nighttime urination (nocturia)   Incontinence (the inability to hold in urine)   Inability to empty the bladder completely    2.   Mixed urinary incontinence, urge = stress:   --Bladder  diary for 3-7 days, write the number of times you go to the rest room and associated symptoms of urgency or leakage.   --Empty bladder every 3 hours.  Empty well: wait a minute, lean forward on toilet.    --Avoid dietary irritants (see sheet).  Keep diary x 3-5 days to determine your irritants.  --KEGELS: do 10 in AM and 10 in PM, holding each x 10 seconds.  When you feel urge to go, STOP, KEGEL, and when urge has passed, then go to bathroom.  Start pelvic floor PT   --URGE: Ditropan 5  mg daily .  SE profile reviewed.  Takes 2-4 weeks to see if will have effect.  For dry mouth: get sour, sugar free lozenge or gum.    --STRESS:  Pessary vs. Sling vs impressa     3. --Nocturia (nighttime urination): stop fluids 2 hours before bed.  If have leg swelling:  Elevate feet above chest x 1 hour before bed to get excess fluid off.  Can also use support hose (knee highs).      Approximately 50 min were spent in consult, 90 % in discussion.     Thank you for requesting consultation of your patient.  I look forward to participating in her care.    Pa Campbell DO  Female Pelvic Medicine and Reconstructive Surgery  [facility] [place], [place]

## 2019-09-18 ENCOUNTER — OFFICE VISIT (OUTPATIENT)
Dept: NEUROLOGY | Facility: CLINIC | Age: 34
End: 2019-09-18
Payer: COMMERCIAL

## 2019-09-18 ENCOUNTER — TELEPHONE (OUTPATIENT)
Dept: PHARMACY | Facility: CLINIC | Age: 34
End: 2019-09-18

## 2019-09-18 VITALS
HEART RATE: 92 BPM | WEIGHT: 273.56 LBS | HEIGHT: 59 IN | BODY MASS INDEX: 55.15 KG/M2 | DIASTOLIC BLOOD PRESSURE: 98 MMHG | SYSTOLIC BLOOD PRESSURE: 126 MMHG | RESPIRATION RATE: 16 BRPM

## 2019-09-18 DIAGNOSIS — E03.9 HYPOTHYROIDISM, UNSPECIFIED TYPE: ICD-10-CM

## 2019-09-18 DIAGNOSIS — G35 MS (MULTIPLE SCLEROSIS): ICD-10-CM

## 2019-09-18 DIAGNOSIS — H46.9 OPTIC NEURITIS: ICD-10-CM

## 2019-09-18 DIAGNOSIS — R90.89 ABNORMAL FINDING ON MRI OF BRAIN: ICD-10-CM

## 2019-09-18 DIAGNOSIS — F41.8 DEPRESSION WITH ANXIETY: ICD-10-CM

## 2019-09-18 DIAGNOSIS — Z91.89 AT RISK FOR STROKE: ICD-10-CM

## 2019-09-18 DIAGNOSIS — Z72.0 TOBACCO USE: ICD-10-CM

## 2019-09-18 DIAGNOSIS — I10 HYPERTENSION, UNSPECIFIED TYPE: ICD-10-CM

## 2019-09-18 LAB — BACTERIA UR CULT: NO GROWTH

## 2019-09-18 PROCEDURE — 99999 PR PBB SHADOW E&M-EST. PATIENT-LVL IV: ICD-10-PCS | Mod: PBBFAC,,, | Performed by: NURSE PRACTITIONER

## 2019-09-18 PROCEDURE — 3008F PR BODY MASS INDEX (BMI) DOCUMENTED: ICD-10-PCS | Mod: CPTII,S$GLB,, | Performed by: NURSE PRACTITIONER

## 2019-09-18 PROCEDURE — 3008F BODY MASS INDEX DOCD: CPT | Mod: CPTII,S$GLB,, | Performed by: NURSE PRACTITIONER

## 2019-09-18 PROCEDURE — 99214 PR OFFICE/OUTPT VISIT, EST, LEVL IV, 30-39 MIN: ICD-10-PCS | Mod: S$GLB,,, | Performed by: NURSE PRACTITIONER

## 2019-09-18 PROCEDURE — 99214 OFFICE O/P EST MOD 30 MIN: CPT | Mod: S$GLB,,, | Performed by: NURSE PRACTITIONER

## 2019-09-18 PROCEDURE — 99999 PR PBB SHADOW E&M-EST. PATIENT-LVL IV: CPT | Mod: PBBFAC,,, | Performed by: NURSE PRACTITIONER

## 2019-09-18 NOTE — PROGRESS NOTES
HPI: Sandra Castelan is a 33 y.o. female with MS, chronic migraine, and anxiety. She has hypothyroidism. She is a manager at a veterinary office.     She presents today for a follow up visit. She received Botox for migraine prevention, which reduced the severity of her headaches; however, they continue to occur daily. Headaches occur daily, are located mid-frontally, and are throbbing in quality. They are constant. Severity ranges from 3-9/10. It is a 3 or 4/10 on most days. She is not taking any abortive therapy, as she failed Exedrin migraine, which caused GI upset. She will take Ibuprofen occasionally, which helps minimally. + photophobia with more severe headaches, + phonophobia most of the time. No nausea or vomiting. No visual complaints. No weakness, dizziness, autonomic complaints.     She did see Dr. Campos/MS Specialist for an opinion on the new lesion on her MRI Brain. Repeat scans with contrast were ordered. No medications were made, as new lesion may have formed just after Copaxone was started; however, if there are any enhancing lesions, new therapy will be considered. She plans to have her MS exclusively followed by Dr. Campos going forward. No new visual complaints or MS type complaints to report. She is compliant with Copaxone.     She does not always sleep well at night. She did see Urology at [facility] regarding her nocturia, who started her on medication for this, and she was referred to PT to strengthen per pelvic muscles. Prn Melatonin has been helpful for insomnia.     She completed a CBC at her last visit, which showed ongoing thrombocytosis and leukocytosis. She was referred to Hematology by her PCP. She saw Dr. Elio Dozier at [facility], who informed her that the changes were likely from inflammatory factors, but did repeat more labwork, which is pending.     She is followed by Psychiatry. Mood is unchanged-ongoing worry, but no overt anxiety or depression.     Repeat CBC done  in 3/2019 showed ongoing mildly elevated platelets.     Review of Systems   Constitutional: Negative for fever.   HENT: Negative for nosebleeds.    Eyes: Negative for double vision.   Respiratory: Negative for hemoptysis.    Cardiovascular: Negative for leg swelling.   Gastrointestinal: Negative for blood in stool.   Genitourinary: Negative for hematuria.   Musculoskeletal: Negative for falls.   Skin: Negative for rash.   Neurological: Positive for headaches. Negative for focal weakness.   Psychiatric/Behavioral: The patient is nervous/anxious and has insomnia.        I have reviewed all of this patient's past medical and surgical histories as well as family and social histories and active allergies and medications as documented in the electronic medical record.    Exam:  Gen Appearance, well developed/nourished in no apparent distress  CV: 2+ distal pulses with no edema or swelling  Neuro:  MS: Awake, alert, oriented to place, person, time, situation. Sustains attention. Recent/remote memory intact, Language is full to spontaneous speech/comprehension. Fund of Knowledge is full  CN: Optic discs are flat with normal vasculature, PERRL, Extraoccular movements and visual fields are full. Normal facial sensation and strength, Hearing symmetric, Tongue and Palate are midline and strong. Shoulder Shrug symmetric and strong.  Motor: Normal bulk, tone, no abnormal movements. 5/5 strength bilateral upper/lower extremities with 2+ reflexes and no clonus  Sensory: symmetric to light touch, pain, temp, and vibration,  Romberg negative  Cerebellar: Finger-nose,Heal-shin, Rapid alternating movements intact  Gait: Normal stance, no ataxia  MSK survey: bilateral trap and occipital tenderness    Imagin/2019 MRI Brain:   1. Multiple areas of abnormal signal alteration involving the periventricular white matter best demonstrated on T2/FLAIR imaging compatible with patient's history of multiple sclerosis.  2. New 12 mm lesion  within the right parietal lobe with increased signal intensity on diffusion-weighted imaging not present on the previous study from July 24, 2018.    7/24/18 MRI Brain: Multiple areas of increased white matter signal in a pattern suggestive of multiple sclerosis, measuring up to 1 cm in the right frontal periventricular region.  Nothing enhancing    7/24/18 MRI C spine: 1. No evidence demyelinating process within the cervical cord.  2. Mild disc bulges as above from C3-6, greatest at C5-6.    7/24/18 MRI T spine: The thoracic spine demonstrates a normal kyphosis.  There is disc desiccation and loss of disc height from T6-9.  There is a left paracentral disc herniation at T8-9 with contact and mild deformity of the left paracentral cord.  No other focal disc protrusion is noted.  No abnormal cord signal is seen.  No abnormal enhancement is seen within the thoracic cord.  Vertebral body marrow signal is unremarkable.     Labs:   8/2018 Lyme titers, FRENCH, hep panel, HiV, RPRP Ace-level, SSA/SSB antibodies, ESR, and paraneoplastic panel, CMP, CBC unremarkable, Vit D 39, CBC 2018:  white blood cell count is mildly elevated, the platelet count is still elevated, and there is mild anemia.   CMP normal 10/2018  Vit D normal 10/2018  7/2019 CBC-elevated WBCs and increased platelets.     Assessment/Plan: Sandra Castelan is a 33 y.o. female diagnosed with optic neuritis in her right eye in 6/2018 by OCT. Subsequent MRI brain 7/2018 shows multiple chronic demyelinating plaques concerning for Multiple Sclerosis. She has noted some left arm and leg numbness for years, dizziness, fatigue, mood disorder, and urinary symptoms. Imaging and cluster of symptoms are consistent with relapsing and remitting MS given her lesions and symptoms   by time and space    I recommend:   1. Stop Botox, as there was not significant relief with this.   2. Start Aimovig for headaches prevention, and keep a headache diary until the next  visit. It can take 3 months to discern the effect of Aimovig. She failed Botox and Zonegran. TCA's cannot be used given her concurrent use of two other antidepressant medications and this may worsen her obesity.   3. She is seeing Hematology at [facility] for her leukocytosis and thrombocytosis, which is believed to be related to inflammatory causes thus far.   4. Advised to continue to see Psychiatry for ongoing anxiety. Continue prn Melatonin prn for insomnia. Would avoid TCA's in this patient, given her concurrent use of Wellbutrin and Lexapro and her obesity.   5. She is now followed by Dr. Campos for her MS exclusively. New lesion found on MRI Brain at last visit per this clinic may have occurred just after Copaxone was started. New scans were ordered with contrast, but no medication changes are planned unless there is an enhancing lesion.   6. She was not given IV steroids at the time of acute ON, but symptoms improved. Labs for MS mimics were normal. She is treated for MS with Copaxone and is tolerating well. LP was not done as her symptoms are rather pathognomonic. Teratogenicity risks of this med reviewed.  7. Continue Vit Supplementation for Vit D deficiency  8. Urology consulted for nocturia. She is taking medication for this and will start PT soon.   9. Smoking cessation is advised to prevent CVA. She has multiple risk factors for CVA including tobacco use, HTN, and birth control use. Birth control pills should not be taken with tobacco use. Discussed elevated platelets and increased risk of thrombotic events with this. Note: she is taking Wellbutrin without effect on her smoking status, though this is taken for anxiety and depression.     RTC 3 months

## 2019-09-25 NOTE — TELEPHONE ENCOUNTER
Phoned [facility] in regard to prior authorization for AIMOVIG submitted verbally to insurance company for review on 9/25/2019  FLC

## 2019-09-25 NOTE — TELEPHONE ENCOUNTER
DOCUMENTATION ONLY:   Prior authorization for AIMOVIG 70 MG/ML approved from 8/26/2019 to 9/24/2020.      Case ID# [id number]    Co-pay: $0    Patient Assistance IS NOT required.     Forward to clinical pharmacist for consult & shipment. FLC

## 2019-09-27 ENCOUNTER — TELEPHONE (OUTPATIENT)
Dept: PHARMACY | Facility: CLINIC | Age: 34
End: 2019-09-27

## 2019-09-30 ENCOUNTER — OFFICE VISIT (OUTPATIENT)
Dept: NEUROLOGY | Facility: CLINIC | Age: 34
End: 2019-09-30
Payer: COMMERCIAL

## 2019-09-30 ENCOUNTER — HOSPITAL ENCOUNTER (OUTPATIENT)
Dept: RADIOLOGY | Facility: HOSPITAL | Age: 34
Discharge: HOME | End: 2019-09-30
Attending: PSYCHIATRY & NEUROLOGY
Payer: COMMERCIAL

## 2019-09-30 ENCOUNTER — PATIENT MESSAGE (OUTPATIENT)
Dept: NEUROLOGY | Facility: CLINIC | Age: 34
End: 2019-09-30

## 2019-09-30 VITALS
BODY MASS INDEX: 55.17 KG/M2 | HEART RATE: 115 BPM | WEIGHT: 273.69 LBS | SYSTOLIC BLOOD PRESSURE: 136 MMHG | HEIGHT: 59 IN | DIASTOLIC BLOOD PRESSURE: 94 MMHG

## 2019-09-30 DIAGNOSIS — G35 MS (MULTIPLE SCLEROSIS): ICD-10-CM

## 2019-09-30 DIAGNOSIS — N31.9 NEUROGENIC BLADDER: ICD-10-CM

## 2019-09-30 DIAGNOSIS — E55.9 VITAMIN D DEFICIENCY: Primary | ICD-10-CM

## 2019-09-30 DIAGNOSIS — R53.83 OTHER FATIGUE: ICD-10-CM

## 2019-09-30 PROCEDURE — 99999 PR PBB SHADOW E&M-EST. PATIENT-LVL IV: ICD-10-PCS | Mod: PBBFAC,,, | Performed by: PSYCHIATRY & NEUROLOGY

## 2019-09-30 PROCEDURE — 72156 MRI NECK SPINE W/O & W/DYE: CPT | Mod: 26,,, | Performed by: RADIOLOGY

## 2019-09-30 PROCEDURE — 70553 MRI BRAIN DEMYELINATING W/ WO CONTRAST: ICD-10-PCS | Mod: 26,,, | Performed by: RADIOLOGY

## 2019-09-30 PROCEDURE — 72157 MRI CHEST SPINE W/O & W/DYE: CPT | Mod: 26,,, | Performed by: RADIOLOGY

## 2019-09-30 PROCEDURE — 25500020 PHARM REV CODE 255: Performed by: PSYCHIATRY & NEUROLOGY

## 2019-09-30 PROCEDURE — 72156 MRI NECK SPINE W/O & W/DYE: CPT | Mod: TC

## 2019-09-30 PROCEDURE — A9585 GADOBUTROL INJECTION: HCPCS | Performed by: PSYCHIATRY & NEUROLOGY

## 2019-09-30 PROCEDURE — 99999 PR PBB SHADOW E&M-EST. PATIENT-LVL IV: CPT | Mod: PBBFAC,,, | Performed by: PSYCHIATRY & NEUROLOGY

## 2019-09-30 PROCEDURE — 3008F BODY MASS INDEX DOCD: CPT | Mod: CPTII,S$GLB,, | Performed by: PSYCHIATRY & NEUROLOGY

## 2019-09-30 PROCEDURE — 72157 MRI THORACIC SPINE DEMYELINATING W W/O CONTRAST: ICD-10-PCS | Mod: 26,,, | Performed by: RADIOLOGY

## 2019-09-30 PROCEDURE — 72156 MRI CERVICAL SPINE DEMYELINATING W W/O CONTRAST: ICD-10-PCS | Mod: 26,,, | Performed by: RADIOLOGY

## 2019-09-30 PROCEDURE — 99215 PR OFFICE/OUTPT VISIT, EST, LEVL V, 40-54 MIN: ICD-10-PCS | Mod: S$GLB,,, | Performed by: PSYCHIATRY & NEUROLOGY

## 2019-09-30 PROCEDURE — 99215 OFFICE O/P EST HI 40 MIN: CPT | Mod: S$GLB,,, | Performed by: PSYCHIATRY & NEUROLOGY

## 2019-09-30 PROCEDURE — 3008F PR BODY MASS INDEX (BMI) DOCUMENTED: ICD-10-PCS | Mod: CPTII,S$GLB,, | Performed by: PSYCHIATRY & NEUROLOGY

## 2019-09-30 PROCEDURE — 70553 MRI BRAIN STEM W/O & W/DYE: CPT | Mod: TC

## 2019-09-30 PROCEDURE — 72157 MRI CHEST SPINE W/O & W/DYE: CPT | Mod: TC

## 2019-09-30 PROCEDURE — 70553 MRI BRAIN STEM W/O & W/DYE: CPT | Mod: 26,,, | Performed by: RADIOLOGY

## 2019-09-30 RX ORDER — GADOBUTROL 604.72 MG/ML
10 INJECTION INTRAVENOUS
Status: COMPLETED | OUTPATIENT
Start: 2019-09-30 | End: 2019-09-30

## 2019-09-30 RX ORDER — DIAZEPAM 5 MG/1
TABLET ORAL
Refills: 0 | COMMUNITY
Start: 2019-09-26 | End: 2019-12-18

## 2019-09-30 RX ADMIN — GADOBUTROL 10 ML: 604.72 INJECTION INTRAVENOUS at 10:09

## 2019-09-30 NOTE — PROGRESS NOTES
Subjective:       Patient ID: aSndra Castelan is a 33 y.o. female who presents today for a routine clinic visit for MS.      MS HPI:  · DMT:  Copaxone   · Side effects from DMT? No  · Taking vitamin D3 as recommended? Yes -  5000 IU/day  · Saw Dr. Campbell--started on oxybutynin, and planning to do pelvic floor PT  · Planning to start Amiovig for migraine  · Starting to go to the gym    SOCIAL HISTORY  Social History     Tobacco Use    Smoking status: Current Every Day Smoker     Packs/day: 0.25     Years: 12.00     Pack years: 3.00     Types: Cigarettes    Smokeless tobacco: Never Used   Substance Use Topics    Alcohol use: No    Drug use: Never     Living arrangements - the patient lives with their family.  Employment:  at [employer]    MS REVIEW OF SYMPTOMS 9/28/2019   Do you feel abnormally tired on most days? Yes     Do you feel you generally sleep well? No --wakes up 1-2 times / night; better on oxybutinin; does not snore;    Do you have difficulty controlling your bladder?  Yes   Do you have difficulty controlling your bowels?  Yes   Do you have frequent muscle cramps, tightness or spasms in your limbs?  No   Do you have new visual symptoms?  No   Do you have worsening difficulty with your memory or thinking? No   Do you have worsening symptoms of anxiety or depression?  No   For patients who walk, Do you have more difficulty walking?  No   Have you fallen since your last visit?  Yes   For patients who use wheelchairs: Do you have any skin wounds or breakdown? Not Applicable   Do you have difficulty using your hands?  Yes   Do you have shooting or burning pain? Yes   Do you have difficulty with sexual function?  No   If you are sexually active, are you using birth control? Y/N  N/A Yes   Do you often choke when swallowing liquids or solid food?  No   Do you experience worsening symptoms when overheated? Yes   Do you need any new equipment such as a wheelchair, walker or shower  chair? No   Do you receive co-pay financial assistance for your principal MS medicine? Yes   Would you be interested in participating in an MS research trial in the future? Yes   Do you feel you have adequate family/friend support?  Yes   Do you have health insurance?   Yes   Are you currently employed? Yes   Do you receive SSDI/SSI?  No   Do you use marijuana or cannabis products? No   Have you been diagnosed with a urinary tract infection since your last visit here? No   Have you been diagnosed with a respiratory tract infection since your last visit here? No   Have you been to the emergency room since your last visit here? No   Have you been hospitalized since your last visit here?  No     FSS SCORE & INTERPRETATION 9/28/2019   FSS SCORE  54   FSS SCORE INTERPRETATION May be suffering from fatigue     MS CANDIS-D SCORE & INTERPRETATION 9/28/2019   CANDIS-D SCORE  10   CANDIS-D INTERPRETATION  No indication of Depression     MS TONY-7 SCORE & INTERPRETATION 9/28/2019   TONY-7 SCORE  5   TONY-7 SCORE INTERPRETATION Mild Anxiety     PEQ MS MOS PAIN EFFECTS SCORE & INTERPRETATION 9/28/2019   PES SCORE 20   PES SCORE INTERPRETATION Scores can range from 6-30.  Items are scaled so that higher scores indicate a greater impact of pain on a patients mood and behavior.     PEQ MS SEXUAL SATISFACTION SCORE & INTERPRETATION 9/28/2019   SSS SCORE  16   SSS SCORE INTERPRETATION Scores can range from 4-24.  Higher scores indicate greater problems with sexual satisfaction.     MS BLADDER CONTROL SCORE & INTERPRETATION 9/28/2019   BLCS SCORE 9   BLCS SCORE INTERPRETATION  Scores can range from 0-22, with higher scores indicating greater bladder control problems.     MS BOWEL CONTROL SCORE & INTERPRETATION 9/28/2019   BWCS SCORE 10   BWCS SCORE INTERPRETATION Scores can range from 0-26, with higher scores indicating greater bowel control problems.     PEQ MS IMPACT OF VISUAL IMPAIRMENT SCORE & INTERPRETATION 9/28/2019   RAJ SCALE SCORE  5    RAJ SCORE INTERPRETATION Scores can range from 0-15, with higher scores indicating greater impact of visual problems on daily activites.     MS PDQ SCORE & INTERPRETATION 9/28/2019   PDQ RETROSPECTIVE MEMORY SUBSCALE 15   PDQ ATTENTION/CONCENTRATION SUBSCALE 12   PDQ PROSPECTIVE MEMORY SUBSCALE 8   PDQ PLANNING/ORGANIZATION SUBSCALE 11   PDQ TOTAL SCORE 46   PDQ SCORE INTERPRETATION Scores can range from 0-80, with higher scores indicating greater perceived cognitive impairment.     MSSS SCORE & INTERPRETATION 9/28/2019   MSSS TANGIBLE SUPPORT SUBSCALE 56.25   MSSS EMOTIONAL/INFORMATIONAL SUPPORT SUBSCALE 65.63   MSSS AFFECTIONATE SUPPORT SUBSCALE 75   MSSS POSITIVE SOCIAL INTERACTION SUBSCALE 75   MSSS TOTAL SCORE 67.97   MSSS SCORE INTERPRETATION Scores can range from 0-100, with higher scores indicating greater perceived support.              Imaging:     Results for orders placed during the hospital encounter of 07/31/19   MRI Brain Demyelinating Without Contrast    Impression 1. Multiple areas of abnormal signal alteration involving the periventricular white matter best demonstrated on T2/FLAIR imaging compatible with patient's history of multiple sclerosis.  2. New 12 mm lesion within the right parietal lobe with increased signal intensity on diffusion-weighted imaging not present on the previous study from July 24, 2018.      Electronically signed by: Manny Blandon MD  Date:    07/31/2019  Time:    11:28       Results for orders placed during the hospital encounter of 09/30/19   MRI Brain Demyelinating W W/O Contrast    Impression Continued scattered foci of T2 FLAIR signal abnormality supratentorial white matter with ill-defined component in the brittnee.  Allowing for differences in technique there is no evidence for significant new lesion.    While nonspecific remains concerning for mild degree of prior demyelinating plaque in light of history.  There is no definite new lesion or enhancing lesion to suggest  significant interval or active demyelination.  Clinical correlation and follow-up advised..    Electronically signed by resident: Michael Yang  Date:    09/30/2019  Time:    11:04    Electronically signed by: Edward Elena DO  Date:    09/30/2019  Time:    11:30     Results for orders placed during the hospital encounter of 09/30/19   MRI Cervical Spine Demyelinating W W/O Contrast    Impression Subtle short-segment STIR signal hyperintensity cervical spinal cord dorsally at the C6 vertebral body level which may be artifactual without corresponding signal abnormality on additional sequences.  Sequela of prior demyelination to be considered in light of history.    No evidence for additional edema signal throughout the remaining cervicothoracic cord.  No abnormal intrathecal enhancement.    Mild degenerative changes as detailed above      Electronically signed by: Edward Elena DO  Date:    09/30/2019  Time:    11:13     Results for orders placed during the hospital encounter of 09/30/19   MRI Thoracic Spine Demyelinating W W/O Contrast    Impression Subtle short-segment STIR signal hyperintensity cervical spinal cord dorsally at the C6 vertebral body level which may be artifactual without corresponding signal abnormality on additional sequences.  Sequela of prior demyelination to be considered in light of history.    No evidence for additional edema signal throughout the remaining cervicothoracic cord.  No abnormal intrathecal enhancement.    Mild degenerative changes as detailed above      Electronically signed by: Edward Elena DO  Date:    09/30/2019  Time:    11:13       Labs:     Lab Results   Component Value Date    [id number] 39 08/03/2018     No results found for: JCVINDEX, JCVANTIBODY  No results found for: AB0EFSLM, ABSOLUTECD3, VO6NHAXY, ABSOLUTECD8, TI1EPCVN, ABSOLUTECD4, LABCD48  Lab Results   Component Value Date    WBC 13.15 (H) 07/24/2019    RBC 4.60 07/24/2019    HGB 11.8 (L) 07/24/2019     HCT 37.1 07/24/2019    MCV 81 (L) 07/24/2019    MCH 25.7 (L) 07/24/2019    MCHC 31.8 (L) 07/24/2019    RDW 14.9 (H) 07/24/2019     (H) 07/24/2019    MPV 9.1 (L) 07/24/2019    GRAN 9.2 (H) 07/24/2019    GRAN 70.1 07/24/2019    LYMPH 2.8 07/24/2019    LYMPH 21.0 07/24/2019    MONO 0.9 07/24/2019    MONO 6.5 07/24/2019    EOS 0.2 07/24/2019    BASO 0.07 07/24/2019    EOSINOPHIL 1.4 07/24/2019    BASOPHIL 0.5 07/24/2019     Sodium   Date Value Ref Range Status   08/03/2018 139 136 - 145 mmol/L Final     Potassium   Date Value Ref Range Status   08/03/2018 4.3 3.5 - 5.1 mmol/L Final     Chloride   Date Value Ref Range Status   08/03/2018 106 95 - 110 mmol/L Final     CO2   Date Value Ref Range Status   08/03/2018 23 23 - 29 mmol/L Final     Glucose   Date Value Ref Range Status   08/03/2018 86 70 - 110 mg/dL Final     BUN, Bld   Date Value Ref Range Status   08/03/2018 13 6 - 20 mg/dL Final     Creatinine   Date Value Ref Range Status   08/03/2018 0.7 0.5 - 1.4 mg/dL Final     Calcium   Date Value Ref Range Status   08/03/2018 9.3 8.7 - 10.5 mg/dL Final     Total Protein   Date Value Ref Range Status   08/03/2018 7.3 6.0 - 8.4 g/dL Final     Albumin   Date Value Ref Range Status   08/03/2018 3.4 (L) 3.5 - 5.2 g/dL Final     Total Bilirubin   Date Value Ref Range Status   08/03/2018 0.3 0.1 - 1.0 mg/dL Final     Comment:     For infants and newborns, interpretation of results should be based  on gestational age, weight and in agreement with clinical  observations.  Premature Infant recommended reference ranges:  Up to 24 hours.............<8.0 mg/dL  Up to 48 hours............<12.0 mg/dL  3-5 days..................<15.0 mg/dL  6-29 days.................<15.0 mg/dL       Alkaline Phosphatase   Date Value Ref Range Status   08/03/2018 88 55 - 135 U/L Final     AST   Date Value Ref Range Status   08/03/2018 15 10 - 40 U/L Final     ALT   Date Value Ref Range Status   08/03/2018 16 10 - 44 U/L Final     Anion Gap    Date Value Ref Range Status   08/03/2018 10 8 - 16 mmol/L Final     eGFR if    Date Value Ref Range Status   08/03/2018 >60.0 >60 mL/min/1.73 m^2 Final     eGFR if non    Date Value Ref Range Status   08/03/2018 >60.0 >60 mL/min/1.73 m^2 Final     Comment:     Calculation used to obtain the estimated glomerular filtration  rate (eGFR) is the CKD-EPI equation.                    Diagnosis/Assessment/Plan:    1. Multiple Sclerosis  · Assessment: Pt clinically stable;  MRIs are all ani negative. New lesion seen in July may likely have formed in first 6 mo on Copaxone  · Imaging: Planned September 2020  · Disease Modifying Therapies: continue Copaxone and vitamin D    2. MS Symptom Assessment / Management  · No other changes to regimen described in ROS above    F/u Kailey Sommers [facility] 4mo         Our visit today lasted 40 minutes, and 100% of this time was spent face to face with the patient. Over 50% of this visit included discussion of the treatment plan/medication changes/symptom management/exam findings/imaging results/coordination of care. The patient agrees with the plan of care.         Problem List Items Addressed This Visit     None      Visit Diagnoses     Vitamin D deficiency    -  Primary    Relevant Orders    Vitamin D    Other fatigue        Relevant Orders    TSH

## 2019-10-01 ENCOUNTER — PATIENT MESSAGE (OUTPATIENT)
Dept: NEUROLOGY | Facility: CLINIC | Age: 34
End: 2019-10-01

## 2019-10-01 NOTE — TELEPHONE ENCOUNTER
She had no significant relief with Botox, and repeat injections were not ordered. Even if they were ordered, they would not occur until 12 weeks after the first injections, as they are given every 3 months.     Continue with order for Aimovig.

## 2019-10-07 ENCOUNTER — PATIENT MESSAGE (OUTPATIENT)
Dept: UROGYNECOLOGY | Facility: CLINIC | Age: 34
End: 2019-10-07

## 2019-10-11 ENCOUNTER — TELEPHONE (OUTPATIENT)
Dept: PHARMACY | Facility: CLINIC | Age: 34
End: 2019-10-11

## 2019-10-11 NOTE — TELEPHONE ENCOUNTER
Initial Aimovig consult completed on Friday 10/11/19. Aimovig 70 mg will be shipped on Monday 10/14/19 to arrive at patient's home on Tuesday 10/15/19 via [employer]. $0.00 copay. Patient intends to start Aimovig on Tuesday 10/15/19. Address confirmed. Confirmed 2 patient identifiers - name and . Therapy Appropriate.    Indication: Migraine prophylaxis   Goals of treatment: Reduction in migraine frequency, duration, and/or intensity (may take 3 months to reach full efficacy)    --Injection experience: Yes  Informed patient on online injection video on  website - links sent via [employer]    Storage: keep refrigerated, do not freeze. Take out of the refrigerator 30-60 minutes prior to injection.    Counseled patient on administration directions:  - Inject 70 mg into the skin every 4 weeks.   - Wash hands before and after injection.  - Monthly RX will come with gauze, bandaids, and alcohol swabs.  - Patient may inject in either the tops of the thighs, abdomen- but at least 2 inches away from belly button, or the outer part of upper arm (with assistance).   - Patient is to wipe down the injection site with the alcohol pad, wait to dry.    - Remove white cap from pen  - Gently squeeze OR stretch the area of the cleaned skin and hold it firmly.  Place the pen flat against the skin then push down on the purple button and release - there will be an initial click; in 10-15 seconds you will hear a second click and the window will go from from clear to yellow, indicating injection is complete.  - It is recommended to rotate injection sites; never inject into irritated skin.   - Patient will use sharps container; once full, per [place] law, she/ he may lock the sharps container and place in trash. Pharmacy will replace the sharps at no additional charge.    Patient was counseled on possible side effects:  - Injection site reaction: redness, soreness, itching, bruising  - constipation  - Muscle cramps (?2%)  - Advised that  there is no data in pregnancy/breastfeeding     Med rec completed. No known drug interactions with Aimovig.    Advised to keep a calendar to stay compliant.     Consultation included the importance of compliance and of keeping all follow up appointments. Patient advised of [facility] hours, refill procedures, clinical followups, and availability of on-call pharmacist Patient understands to report any medication changes to [facility] and provider. All questions answered and addressed to patients satisfaction. RPh to touch base with patient in 7 days and [facility] to contact patient in 3 weeks for refills.     Julio César Thomas, PharmD  Clinical Pharmacist  [facility]  P: [phone]    [employer] sent to provider on 10/11/19

## 2019-11-05 ENCOUNTER — TELEPHONE (OUTPATIENT)
Dept: PHARMACY | Facility: CLINIC | Age: 34
End: 2019-11-05

## 2019-11-06 ENCOUNTER — TELEPHONE (OUTPATIENT)
Dept: PHARMACY | Facility: CLINIC | Age: 34
End: 2019-11-06

## 2019-11-24 ENCOUNTER — PATIENT MESSAGE (OUTPATIENT)
Dept: UROGYNECOLOGY | Facility: CLINIC | Age: 34
End: 2019-11-24

## 2019-11-29 ENCOUNTER — TELEPHONE (OUTPATIENT)
Dept: PHARMACY | Facility: CLINIC | Age: 34
End: 2019-11-29

## 2019-12-18 ENCOUNTER — OFFICE VISIT (OUTPATIENT)
Dept: NEUROLOGY | Facility: CLINIC | Age: 34
End: 2019-12-18
Payer: COMMERCIAL

## 2019-12-18 VITALS
BODY MASS INDEX: 56.74 KG/M2 | HEIGHT: 59 IN | DIASTOLIC BLOOD PRESSURE: 80 MMHG | RESPIRATION RATE: 20 BRPM | WEIGHT: 281.44 LBS | SYSTOLIC BLOOD PRESSURE: 110 MMHG | HEART RATE: 116 BPM

## 2019-12-18 DIAGNOSIS — Z72.0 TOBACCO USE: ICD-10-CM

## 2019-12-18 DIAGNOSIS — R35.1 NOCTURIA: ICD-10-CM

## 2019-12-18 DIAGNOSIS — E03.9 HYPOTHYROIDISM, UNSPECIFIED TYPE: ICD-10-CM

## 2019-12-18 DIAGNOSIS — H46.9 OPTIC NEURITIS: ICD-10-CM

## 2019-12-18 DIAGNOSIS — I10 HYPERTENSION, UNSPECIFIED TYPE: ICD-10-CM

## 2019-12-18 DIAGNOSIS — G35 MS (MULTIPLE SCLEROSIS): ICD-10-CM

## 2019-12-18 DIAGNOSIS — G56.00 CARPAL TUNNEL SYNDROME, UNSPECIFIED LATERALITY: ICD-10-CM

## 2019-12-18 DIAGNOSIS — F41.8 DEPRESSION WITH ANXIETY: ICD-10-CM

## 2019-12-18 DIAGNOSIS — M62.89 PELVIC FLOOR DYSFUNCTION: ICD-10-CM

## 2019-12-18 PROBLEM — R90.89 ABNORMAL FINDING ON MRI OF BRAIN: Status: RESOLVED | Noted: 2019-09-03 | Resolved: 2019-12-18

## 2019-12-18 PROCEDURE — 99214 PR OFFICE/OUTPT VISIT, EST, LEVL IV, 30-39 MIN: ICD-10-PCS | Mod: S$GLB,,, | Performed by: NURSE PRACTITIONER

## 2019-12-18 PROCEDURE — 99999 PR PBB SHADOW E&M-EST. PATIENT-LVL V: CPT | Mod: PBBFAC,,, | Performed by: NURSE PRACTITIONER

## 2019-12-18 PROCEDURE — 3008F BODY MASS INDEX DOCD: CPT | Mod: CPTII,S$GLB,, | Performed by: NURSE PRACTITIONER

## 2019-12-18 PROCEDURE — 99214 OFFICE O/P EST MOD 30 MIN: CPT | Mod: S$GLB,,, | Performed by: NURSE PRACTITIONER

## 2019-12-18 PROCEDURE — 99999 PR PBB SHADOW E&M-EST. PATIENT-LVL V: ICD-10-PCS | Mod: PBBFAC,,, | Performed by: NURSE PRACTITIONER

## 2019-12-18 PROCEDURE — 3008F PR BODY MASS INDEX (BMI) DOCUMENTED: ICD-10-PCS | Mod: CPTII,S$GLB,, | Performed by: NURSE PRACTITIONER

## 2019-12-18 RX ORDER — TOPIRAMATE 50 MG/1
50 TABLET, FILM COATED ORAL 2 TIMES DAILY
Qty: 60 TABLET | Refills: 3 | Status: SHIPPED | OUTPATIENT
Start: 2019-12-18 | End: 2020-01-30 | Stop reason: SDUPTHER

## 2019-12-18 NOTE — PROGRESS NOTES
HPI: Sandra Castelan is a 34 y.o. female with MS, chronic migraine, and anxiety. She has hypothyroidism. She is a manager at a veterinary office.     She presents today for a follow up visit. Aimovig was started for headache prevention after having little response to Botox injections. This has been completely ineffective. She has also stopped taking birth control, but this has not had an effect on her headaches. She rarely takes abortive agents.     Headaches continue to occur daily. Headaches occur daily, are located mid-frontally, and are throbbing in quality. They are constant. Severity ranges from 3-9/10. It is a 3 or 4/10 on most days. She is not taking any abortive therapy, as she failed Exedrin migraine, which caused GI upset. She will take Ibuprofen occasionally, which helps minimally. + photophobia with more severe headaches, + phonophobia most of the time. No nausea or vomiting. No visual complaints. No weakness, dizziness, autonomic complaints.     She did see Dr. Campos/MS Specialist for an opinion on the new lesion on her MRI Brain. Repeat scans with contrast were ordered, and none of her lesions enhanced. No medications were made, as new lesion may have formed just after Copaxone was started. She plans to have her MS exclusively followed by Dr. Campos going forward. No new visual complaints or MS type complaints to report. She is compliant with Copaxone.     She does not always sleep well at night. [place] [facility] [place] who started her on medication for this, and she was referred to PT to strengthen per pelvic muscles. She has not yet started, and is looking for a provider in this area. Prn Melatonin has been helpful for insomnia.     She completed a CBC at her last visit, which showed ongoing thrombocytosis and leukocytosis. She was referred to Hematology by her PCP. She saw Dr. Elio Dozier at [facility], who informed her that the changes were likely  from inflammatory factors, but did repeat more labwork, which is pending.     She is followed by Psychiatry. Mood is unchanged-ongoing worry, but no overt anxiety or depression.     Review of Systems   Constitutional: Negative for fever.   HENT: Negative for nosebleeds.    Eyes: Negative for double vision.   Respiratory: Negative for hemoptysis.    Cardiovascular: Negative for leg swelling.   Gastrointestinal: Negative for blood in stool.   Genitourinary: Negative for hematuria.   Musculoskeletal: Negative for falls.   Skin: Negative for rash.   Neurological: Positive for headaches. Negative for focal weakness.   Psychiatric/Behavioral: The patient is nervous/anxious and has insomnia.        I have reviewed all of this patient's past medical and surgical histories as well as family and social histories and active allergies and medications as documented in the electronic medical record.    Exam:  Gen Appearance, well developed/nourished in no apparent distress  CV: 2+ distal pulses with no edema or swelling  Neuro:  MS: Awake, alert, oriented to place, person, time, situation. Sustains attention. Recent/remote memory intact, Language is full to spontaneous speech/comprehension. Fund of Knowledge is full  CN: Optic discs are flat with normal vasculature, PERRL, Extraoccular movements and visual fields are full. Normal facial sensation and strength, Hearing symmetric, Tongue and Palate are midline and strong. Shoulder Shrug symmetric and strong.  Motor: Normal bulk, tone, no abnormal movements. 5/5 strength bilateral upper/lower extremities with 2+ reflexes and no clonus  Sensory: symmetric to light touch, pain, temp, and vibration,  Romberg negative  Cerebellar: Finger-nose,Heal-shin, Rapid alternating movements intact  Gait: Normal stance, no ataxia    Imagin/2019 MRI Brain:   1. Multiple areas of abnormal signal alteration involving the periventricular white matter best demonstrated on T2/FLAIR imaging  compatible with patient's history of multiple sclerosis.  2. New 12 mm lesion within the right parietal lobe with increased signal intensity on diffusion-weighted imaging not present on the previous study from July 24, 2018.    7/24/18 MRI Brain: Multiple areas of increased white matter signal in a pattern suggestive of multiple sclerosis, measuring up to 1 cm in the right frontal periventricular region.  Nothing enhancing    7/24/18 MRI C spine: 1. No evidence demyelinating process within the cervical cord.  2. Mild disc bulges as above from C3-6, greatest at C5-6.    7/24/18 MRI T spine: The thoracic spine demonstrates a normal kyphosis.  There is disc desiccation and loss of disc height from T6-9.  There is a left paracentral disc herniation at T8-9 with contact and mild deformity of the left paracentral cord.  No other focal disc protrusion is noted.  No abnormal cord signal is seen.  No abnormal enhancement is seen within the thoracic cord.  Vertebral body marrow signal is unremarkable.     Labs:   8/2018 Lyme titers, FRENCH, hep panel, HiV, RPRP Ace-level, SSA/SSB antibodies, ESR, and paraneoplastic panel, CMP, CBC unremarkable, Vit D 39, CBC 2018:  white blood cell count is mildly elevated, the platelet count is still elevated, and there is mild anemia.   CMP normal 10/2018  Vit D normal 10/2018  7/2019 CBC-elevated WBCs and increased platelets.     Assessment/Plan: Sandra Castelan is a 34 y.o. female diagnosed with optic neuritis in her right eye in 6/2018 by OCT. Subsequent MRI brain 7/2018 shows multiple chronic demyelinating plaques concerning for Multiple Sclerosis. She has noted some left arm and leg numbness for years, dizziness, fatigue, mood disorder, and urinary symptoms. Imaging and cluster of symptoms are consistent with relapsing and remitting MS given her lesions and symptoms   by time and space    I recommend:   1. Stop Aimovig, as this was ineffective.   -She failed Botox and  Zonegran.   -TCA's cannot be used given her concurrent use of two other antidepressant medications and this may worsen her obesity.   -Trial of Topamax for headache prevention. She took this prior for mood and weight loss.   2. Keep a headache log until the  next visit.   3. She is seeing Hematology at [facility] for her leukocytosis and thrombocytosis, which is believed to be related to inflammatory causes thus far.   4. Advised to continue to see Psychiatry for ongoing anxiety. Continue prn Melatonin prn for insomnia. Would avoid TCA's in this patient, given her concurrent use of Wellbutrin and Lexapro and her obesity.   5. She is now followed by Dr. Campos for her MS exclusively. New lesion found on MRI Brain at last visit per this clinic may have occurred just after Copaxone was started. New scans were ordered with contrast, but none of her lesions enhanced, and she remains on Copaxone.   6. She was not given IV steroids at the time of acute ON, but symptoms improved. Labs for MS mimics were normal. She is treated for MS with Copaxone and is tolerating well. LP was not done as her symptoms are rather pathognomonic. Teratogenicity risks of this med reviewed.  7. Continue Vit Supplementation for Vit D deficiency  8. Urology consulted for nocturia. She is taking medication for this and will start PT soon.   9. Smoking cessation is advised to prevent CVA. She has multiple risk factors for CVA including tobacco use, HTN, and birth control use. Birth control pills should not be taken with tobacco use. Discussed elevated platelets and increased risk of thrombotic events with this. Note: she is taking Wellbutrin without effect on her smoking status, though this is taken for anxiety and depression.   10. Refer to [facility] PT for pelvic floor therapy. She was referred for this per Urology prior, but had not been able to find a local provider at that time.     RTC 3 months

## 2019-12-19 ENCOUNTER — OFFICE VISIT (OUTPATIENT)
Dept: UROGYNECOLOGY | Facility: CLINIC | Age: 34
End: 2019-12-19
Payer: COMMERCIAL

## 2019-12-19 VITALS
HEIGHT: 59 IN | DIASTOLIC BLOOD PRESSURE: 82 MMHG | SYSTOLIC BLOOD PRESSURE: 114 MMHG | WEIGHT: 282.88 LBS | BODY MASS INDEX: 57.03 KG/M2

## 2019-12-19 DIAGNOSIS — G35 MS (MULTIPLE SCLEROSIS): ICD-10-CM

## 2019-12-19 DIAGNOSIS — N39.46 MIXED INCONTINENCE: Primary | ICD-10-CM

## 2019-12-19 PROCEDURE — 99214 PR OFFICE/OUTPT VISIT, EST, LEVL IV, 30-39 MIN: ICD-10-PCS | Mod: S$GLB,,, | Performed by: OBSTETRICS & GYNECOLOGY

## 2019-12-19 PROCEDURE — 99214 OFFICE O/P EST MOD 30 MIN: CPT | Mod: S$GLB,,, | Performed by: OBSTETRICS & GYNECOLOGY

## 2019-12-19 PROCEDURE — 99999 PR PBB SHADOW E&M-EST. PATIENT-LVL IV: CPT | Mod: PBBFAC,,, | Performed by: OBSTETRICS & GYNECOLOGY

## 2019-12-19 PROCEDURE — 99999 PR PBB SHADOW E&M-EST. PATIENT-LVL IV: ICD-10-PCS | Mod: PBBFAC,,, | Performed by: OBSTETRICS & GYNECOLOGY

## 2019-12-19 PROCEDURE — 3008F BODY MASS INDEX DOCD: CPT | Mod: CPTII,S$GLB,, | Performed by: OBSTETRICS & GYNECOLOGY

## 2019-12-19 PROCEDURE — 3008F PR BODY MASS INDEX (BMI) DOCUMENTED: ICD-10-PCS | Mod: CPTII,S$GLB,, | Performed by: OBSTETRICS & GYNECOLOGY

## 2019-12-19 RX ORDER — OXYBUTYNIN CHLORIDE 10 MG/1
10 TABLET, EXTENDED RELEASE ORAL DAILY
Qty: 30 TABLET | Refills: 11 | Status: SHIPPED | OUTPATIENT
Start: 2019-12-19 | End: 2020-01-30

## 2019-12-19 NOTE — PROGRESS NOTES
Subjective:       Patient ID: Sandra France is a 34 y.o. female.    Chief Complaint:  Urinary Incontinence (follow up)      History of Present Illness  HPI 34 Y OF  has a past medical history of Anemia, Anxiety, Anxiety, Back pain, Bleeds easily, Change in bowel habit, Changes in skin texture, Confusion, Depression, Depression, Dizziness, Dry skin, Fatigue, Has daytime drowsiness, Headache, Heartburn, History of weakness of extremity, HTN (hypertension), psychiatric care, Hyperthyroidism, Insomnia, Lethargy, Loose stools, Memory loss, Migraine, MS (multiple sclerosis), Neck pain, Neuropathy, Numbness of extremity, Optic neuritis, PCOS (polycystic ovarian syndrome), Psychiatric problem, Rash, Restless sleeper, Stomach pain, Therapy, Thyroid disease, Thyroid disease, Tingling in extremities, Tiredness, Trouble walking, Unsteady gait, Urine troubles, Voiding dysfunction, Wheezing, and Worries. referred by Dr. Campos for evaluation of urinary urgency and urge incontinence.     Here for follow up has not started pelvic floor PT. Ditropan 5 mg more helpful in the beginning, not as effective now. Has no side effects.     [facility]rog[facility] Hpi     Question 9/15/2019  7:17 PM CDT - Filed by Patient on 9/15/2019   General Urogynecology: Are you experiencing the following?    Dysuria (painful urination) No   Nocturia:  waking up at night to empty your bladder  Yes   If you answered yes to the previous question, how many times does this happen per night? 1-2   Enuresis (urine loss during sleep) Yes   Dribbling urine after you urinate Yes   Hematuria (urine appears red) No   Type of stream Strong   Urinary frequency: How often a day are you going to the bathroom per day?  10-15   Urinary Tract Infections: How many Urinary Tract Infections have you had in the past year? I have not had a UTI in the past year   If you have had a UTI in the past year, what treatments have you had so far?  I have not had a UTI in the past year  "  Urinary Incontinence (General): Are you experiencing the following?    Past consultation for incontinence: Have you ever seen someone for the evaluation of incontinence? No   If you answered yes to the previous question, please select all the therapies you have tried.  N/a- I answered no to the previous question    None of the above   Please note the effectiveness of the therapies.    Need to wear protection to keep clothes dry  Yes   If you answered yes to the previous question, please ck the protection you use.  Pads   If you wear protection, how much wetness is typically on each pad? Light   If you wear protection, how often do you have to change per day, if applicable?  1   Stress Symptoms: Are you experiencing the following?    Leakage of urine with cough, laugh and/or sneeze Yes   If you answered yes to the previous question, what is the frequency in days, weeks and/or months? Daily   Leakage of urine with sex Yes   Leakage of urine with bending/ lifting Yes   Leakage of urine with briskly walking or jogging Yes   If you lose urine for any other reason not previously mentioned, please note it below, if applicable.     Urge Symptoms: Are you experiencing the following?    Urgency ("got to go" feeling) Yes   Urge: How frequently do you feel an urge to urinate (feeling like you "gotta go" to the bathroom and can't wait) Several times a day   Do you experience a leakage of urine when you have a feeling of urgency?  Yes   Leakage of urine when unaware Yes   Past use of anticholinergics (medications used to treat overactive bladder) No   If you answered yes to the previous question, please ck the anticholinergics you have used:     Have you ever used Mirbetriq (aka Mirabegron)?  No   Prolapse Symptoms: Are you experiencing any of the following?     Falling out/ Bulging/ Heaviness in the vagina No   Vaginal/ Abdominal Pain/ Pressure Yes   Need to strain/ Push to void Yes   Need to wait on the toilet before you " void Yes   Unusual position to urinate (using your hands to push back the vaginal bulge) Yes   Sensation of incomplete emptying Yes   Past use of pessary device No   If you answered yes to the previous question, please list the devices you have used below.     Bowel Symptoms: Are you experiencing any of the following?    Constipation Yes   Diarrhea  Yes   Hematochezia (bloody stool) No   Incomplete evacuation of stool Yes   Involuntary loss of formed stool Yes   Fecal smearing/urgency Yes   Involuntary loss of gas Yes   Vaginal Symptoms: Are you experiencing any of the following?     Abnormal vaginal bleeding  No   Vaginal dryness No   Sexually active  Yes   Dyspareunia (painful intercourse) No   Estrogen use  Yes       GYN & OB History  No LMP recorded. Patient is pregnant.   Date of Last Pap: 2018    OB History    Para Term  AB Living   1             SAB TAB Ectopic Multiple Live Births                  # Outcome Date GA Lbr Fili/2nd Weight Sex Delivery Anes PTL Lv   1 Current              OB  Largest: None   Forceps:  Episiotomy:      GYN  Menarche: 12  Menstrual cycle: 28/4-5/moderate / cramping   Menopause: n/a  Hysterectomy: No   Ovaries: present     Review of Systems  Review of Systems   Constitutional: Negative.  Negative for activity change, appetite change, chills, diaphoresis, fatigue, fever and unexpected weight change.   HENT: Negative.    Eyes: Negative.    Respiratory: Negative.  Negative for apnea, cough and wheezing.    Cardiovascular: Negative.  Negative for chest pain and palpitations.   Gastrointestinal: Negative for abdominal distention, abdominal pain, anal bleeding, blood in stool, constipation, diarrhea, nausea, rectal pain and vomiting.   Endocrine: Negative.    Genitourinary: Positive for frequency and urgency. Negative for decreased urine volume, difficulty urinating, dyspareunia, dysuria, enuresis, flank pain, genital sores, hematuria, menstrual problem, pelvic pain,  vaginal bleeding, vaginal discharge and vaginal pain.   Musculoskeletal: Negative for back pain and gait problem.   Skin: Negative for color change, pallor, rash and wound.   Allergic/Immunologic: Negative for immunocompromised state.   Neurological: Negative.  Negative for dizziness and speech difficulty.   Hematological: Negative for adenopathy.   Psychiatric/Behavioral: Negative for agitation, behavioral problems, confusion and sleep disturbance.           Objective:     Physical Exam   Constitutional: She is oriented to person, place, and time. She appears well-developed.   HENT:   Head: Normocephalic and atraumatic.   Eyes: Conjunctivae and EOM are normal.   Neck: Normal range of motion. Neck supple.   Cardiovascular: Normal rate, regular rhythm, S1 normal, S2 normal, normal heart sounds and intact distal pulses.   Pulmonary/Chest: Effort normal and breath sounds normal. She exhibits no tenderness.   Abdominal: Soft. Bowel sounds are normal. She exhibits no distension and no mass. There is no hepatosplenomegaly, splenomegaly or hepatomegaly. There is no tenderness. There is no rigidity, no rebound, no guarding and no CVA tenderness. No hernia.   Genitourinary: Pelvic exam was performed with patient supine. Rectum normal, vagina normal, uterus normal, cervix normal, skenes normal and bartholins normal. Right labia normal and left labia normal. Urethra exhibits hypermobility. Urethra exhibits no urethral caruncle, no urethral diverticulum and no urethral mass. Right bartholin is not enlarged and not tender. Left bartholin is not enlarged and not tender. Rectal exam shows resting tone normal and active tone normal. Rectal exam shows no external hemorrhoid, no fissure, no tenderness, anal tone normal and no dovetailing. Guaiac negative stool. There is atrophy in the vagina. No foreign body, tenderness, bleeding, unspecified prolapse of vaginal walls, fistula, mesh exposure or lavator tenderness in the vagina. No  vaginal discharge found. Right adnexum displays no mass and no tenderness. Left adnexum displays no mass and no tenderness. Cervix exhibits no motion tenderness and no discharge. Uterus is not tender and not experiencing uterine prolapse.   PVR: 50 ML  Empty cough stress test: Negative.  Kegel: 3/5    POP-Q  Aa: -2 Ba: -2 C: -5   GH: 3 PB: 2 TVL: 8   Ap: -2 Bp: -2 D: -6                     Musculoskeletal: Normal range of motion.   Lymphadenopathy:     She has no axillary adenopathy.        Right: No inguinal adenopathy present.        Left: No inguinal adenopathy present.   Neurological: She is alert and oriented to person, place, and time. She has normal strength and normal reflexes. Cranial nerves II through XII intact. No cranial nerve deficit.   Skin: Skin is warm, dry and intact.   Psychiatric: She has a normal mood and affect. Her speech is normal and behavior is normal. Judgment normal. Cognition and memory are normal.           HCM  Pap's: normal,   Mammo: n/a  Colonoscopy: no  Dexa:no        Assessment:        1. Mixed incontinence    2. MS (multiple sclerosis)             Plan:      1. Hx of MS:   We reviewed that bladder dysfunction can be seen in the majority of patients with MS. The symptoms range from not being able to emptying to urinary frequency and urinary incontinence.  This may be as a results of  block or delay transmission of nerve signals in areas of the central nervous system (CNS)  that control the bladder and urinary sphincters due to the MS brain lesions. A spastic (overactive) bladder that is unable to hold the normal amount of urine, or a bladder that does not empty properly (retains some urine in it). Many symptoms are associated with MS vary and sometimes need additional testing to better evaluate these urinary problems.    Frequency and/or urgency of urination   Hesitancy in starting urination   Frequent nighttime urination (nocturia)   Incontinence (the inability to hold in urine)    Inability to empty the bladder completely    2.   Mixed urinary incontinence, urge = stress:   --Bladder diary for 3-7 days, write the number of times you go to the rest room and associated symptoms of urgency or leakage.   --Empty bladder every 3 hours.  Empty well: wait a minute, lean forward on toilet.    --Avoid dietary irritants (see sheet).  Keep diary x 3-5 days to determine your irritants.  --KEGELS: do 10 in AM and 10 in PM, holding each x 10 seconds.  When you feel urge to go, STOP, KEGEL, and when urge has passed, then go to bathroom.  Start pelvic floor PT   --URGE: Increase to ditropan 10  mg daily .  SE profile reviewed.  Takes 2-4 weeks to see if will have effect.  For dry mouth: get sour, sugar free lozenge or gum.    --STRESS:  Pessary vs. Sling vs impressa     3. --Nocturia (nighttime urination): stop fluids 2 hours before bed.  If have leg swelling:  Elevate feet above chest x 1 hour before bed to get excess fluid off.  Can also use support hose (knee highs).      Approximately 30 min were spent in consult, 90 % in discussion.     Pa Campbell DO  Female Pelvic Medicine and Reconstructive Surgery  [facility] [place], [place]

## 2019-12-19 NOTE — PATIENT INSTRUCTIONS
1. Hx of MS:   We reviewed that bladder dysfunction can be seen in the majority of patients with MS. The symptoms range from not being able to emptying to urinary frequency and urinary incontinence.  This may be as a results of  block or delay transmission of nerve signals in areas of the central nervous system (CNS)  that control the bladder and urinary sphincters due to the MS brain lesions. A spastic (overactive) bladder that is unable to hold the normal amount of urine, or a bladder that does not empty properly (retains some urine in it). Many symptoms are associated with MS vary and sometimes need additional testing to better evaluate these urinary problems.    Frequency and/or urgency of urination   Hesitancy in starting urination   Frequent nighttime urination (nocturia)   Incontinence (the inability to hold in urine)   Inability to empty the bladder completely    2.   Mixed urinary incontinence, urge = stress:   --Bladder diary for 3-7 days, write the number of times you go to the rest room and associated symptoms of urgency or leakage.   --Empty bladder every 3 hours.  Empty well: wait a minute, lean forward on toilet.    --Avoid dietary irritants (see sheet).  Keep diary x 3-5 days to determine your irritants.  --KEGELS: do 10 in AM and 10 in PM, holding each x 10 seconds.  When you feel urge to go, STOP, KEGEL, and when urge has passed, then go to bathroom.  Start pelvic floor PT   --URGE: Increase to ditropan 10  mg daily .  SE profile reviewed.  Takes 2-4 weeks to see if will have effect.  For dry mouth: get sour, sugar free lozenge or gum.    --STRESS:  Pessary vs. Sling vs impressa     3. --Nocturia (nighttime urination): stop fluids 2 hours before bed.  If have leg swelling:  Elevate feet above chest x 1 hour before bed to get excess fluid off.  Can also use support hose (knee highs).

## 2020-01-06 ENCOUNTER — TELEPHONE (OUTPATIENT)
Dept: PHARMACY | Facility: CLINIC | Age: 35
End: 2020-01-06

## 2020-01-07 NOTE — TELEPHONE ENCOUNTER
FOR DOCUMENTATION ONLY:  [Patient gave permission to apply for Glatopa copay card]  Financial Assistance for Glatopa approved with a Co-Pay Card from 01/07/2020 - 01/07/2021    ID: [id number]  BIN: [id number]  GRP: [id number]    Max Amount: $9,000/yr in assistance.

## 2020-01-24 ENCOUNTER — PATIENT MESSAGE (OUTPATIENT)
Dept: UROGYNECOLOGY | Facility: CLINIC | Age: 35
End: 2020-01-24

## 2020-01-28 ENCOUNTER — PATIENT MESSAGE (OUTPATIENT)
Dept: NEUROLOGY | Facility: CLINIC | Age: 35
End: 2020-01-28

## 2020-01-30 ENCOUNTER — OFFICE VISIT (OUTPATIENT)
Dept: NEUROLOGY | Facility: CLINIC | Age: 35
End: 2020-01-30
Payer: COMMERCIAL

## 2020-01-30 VITALS
SYSTOLIC BLOOD PRESSURE: 122 MMHG | DIASTOLIC BLOOD PRESSURE: 90 MMHG | OXYGEN SATURATION: 98 % | RESPIRATION RATE: 10 BRPM | HEART RATE: 97 BPM | WEIGHT: 274.5 LBS | BODY MASS INDEX: 55.34 KG/M2 | HEIGHT: 59 IN

## 2020-01-30 DIAGNOSIS — H46.9 OPTIC NEURITIS: ICD-10-CM

## 2020-01-30 DIAGNOSIS — G56.00 CARPAL TUNNEL SYNDROME, UNSPECIFIED LATERALITY: ICD-10-CM

## 2020-01-30 DIAGNOSIS — I10 HYPERTENSION, UNSPECIFIED TYPE: ICD-10-CM

## 2020-01-30 DIAGNOSIS — G35 MS (MULTIPLE SCLEROSIS): ICD-10-CM

## 2020-01-30 DIAGNOSIS — E03.9 HYPOTHYROIDISM, UNSPECIFIED TYPE: ICD-10-CM

## 2020-01-30 DIAGNOSIS — F41.8 DEPRESSION WITH ANXIETY: ICD-10-CM

## 2020-01-30 DIAGNOSIS — Z72.0 TOBACCO USE: ICD-10-CM

## 2020-01-30 DIAGNOSIS — M62.89 PELVIC FLOOR DYSFUNCTION: ICD-10-CM

## 2020-01-30 PROCEDURE — 99999 PR PBB SHADOW E&M-EST. PATIENT-LVL V: CPT | Mod: PBBFAC,,, | Performed by: NURSE PRACTITIONER

## 2020-01-30 PROCEDURE — 99214 OFFICE O/P EST MOD 30 MIN: CPT | Mod: S$GLB,,, | Performed by: NURSE PRACTITIONER

## 2020-01-30 PROCEDURE — 3074F PR MOST RECENT SYSTOLIC BLOOD PRESSURE < 130 MM HG: ICD-10-PCS | Mod: CPTII,S$GLB,, | Performed by: NURSE PRACTITIONER

## 2020-01-30 PROCEDURE — 99999 PR PBB SHADOW E&M-EST. PATIENT-LVL V: ICD-10-PCS | Mod: PBBFAC,,, | Performed by: NURSE PRACTITIONER

## 2020-01-30 PROCEDURE — 3080F DIAST BP >= 90 MM HG: CPT | Mod: CPTII,S$GLB,, | Performed by: NURSE PRACTITIONER

## 2020-01-30 PROCEDURE — 3008F BODY MASS INDEX DOCD: CPT | Mod: CPTII,S$GLB,, | Performed by: NURSE PRACTITIONER

## 2020-01-30 PROCEDURE — 3008F PR BODY MASS INDEX (BMI) DOCUMENTED: ICD-10-PCS | Mod: CPTII,S$GLB,, | Performed by: NURSE PRACTITIONER

## 2020-01-30 PROCEDURE — 3074F SYST BP LT 130 MM HG: CPT | Mod: CPTII,S$GLB,, | Performed by: NURSE PRACTITIONER

## 2020-01-30 PROCEDURE — 99214 PR OFFICE/OUTPT VISIT, EST, LEVL IV, 30-39 MIN: ICD-10-PCS | Mod: S$GLB,,, | Performed by: NURSE PRACTITIONER

## 2020-01-30 PROCEDURE — 3080F PR MOST RECENT DIASTOLIC BLOOD PRESSURE >= 90 MM HG: ICD-10-PCS | Mod: CPTII,S$GLB,, | Performed by: NURSE PRACTITIONER

## 2020-01-30 RX ORDER — TOPIRAMATE 100 MG/1
100 TABLET, FILM COATED ORAL 2 TIMES DAILY
Qty: 60 TABLET | Refills: 3 | Status: SHIPPED | OUTPATIENT
Start: 2020-01-30 | End: 2020-07-15 | Stop reason: SDUPTHER

## 2020-01-30 NOTE — PROGRESS NOTES
HPI: Sandra France is a 34 y.o. female with MS, chronic migraine, and anxiety. She has hypothyroidism. She is a manager at a veterinary office.     She presents today for a follow up visit. Topamax was restarted at her last visit for headache prevention, which has been moderately effective. She had more relief upon starting this initially. She is interested in increasing this at this time.     She plans to see an ENT for evaluation of possible of sinus issues. She is seeing Dr. Lozoya.     Headaches continue to occur daily, but are less severe. They are located mid-frontally, and are throbbing in quality. They are constant. Severity ranges from 3-9/10. It is a 3 or 4/10 on most days. She is not taking any abortive therapy, as she failed Exedrin migraine, which caused GI upset. She will take Ibuprofen occasionally, which helps minimally. + photophobia with more severe headaches, + phonophobia most of the time. No nausea or vomiting. No visual complaints. No weakness, dizziness, autonomic complaints.     She does go to a chiropractor for C-spine tension.     She did see Dr. Campos/MS Specialist for an opinion on the new lesion on her MRI Brain. Repeat scans with contrast were ordered, and none of her lesions enhanced. No medications were made, as new lesion may have formed just after Copaxone was started. She plans to have her MS exclusively followed by Dr. Campos going forward. No new visual complaints or MS type complaints to report. She is compliant with Copaxone.     She does not always sleep well at night. She did see Urology at [facility] regarding her nocturia, who started her on medication for this, and she was referred to PT to strengthen per pelvic muscles. She has not yet started, and is looking for a provider in this area. Prn Melatonin has been helpful for insomnia.     She completed a CBC at a prior visit, which showed ongoing thrombocytosis and leukocytosis. She was referred to Hematology by her PCP. She  saw Dr. Elio Dozier at [facility], who informed her that the changes were likely from inflammatory factors, but did repeat more labwork, which is pending.     She is followed by Psychiatry. Mood is unchanged-ongoing worry, but no overt anxiety or depression.     Review of Systems   Constitutional: Negative for fever.   HENT: Negative for nosebleeds.    Eyes: Negative for double vision.   Respiratory: Negative for hemoptysis.    Cardiovascular: Negative for leg swelling.   Gastrointestinal: Negative for blood in stool.   Genitourinary: Negative for hematuria.   Musculoskeletal: Negative for falls.   Skin: Negative for rash.   Neurological: Positive for headaches. Negative for focal weakness.   Psychiatric/Behavioral: The patient is nervous/anxious and has insomnia.        I have reviewed all of this patient's past medical and surgical histories as well as family and social histories and active allergies and medications as documented in the electronic medical record.    Exam:  Gen Appearance, well developed/nourished in no apparent distress  CV: 2+ distal pulses with no edema or swelling  Neuro:  MS: Awake, alert, oriented to place, person, time, situation. Sustains attention. Recent/remote memory intact, Language is full to spontaneous speech/comprehension. Fund of Knowledge is full  CN: Optic discs are flat with normal vasculature, PERRL, Extraoccular movements and visual fields are full. Normal facial sensation and strength, Hearing symmetric, Tongue and Palate are midline and strong. Shoulder Shrug symmetric and strong.  Motor: Normal bulk, tone, no abnormal movements. 5/5 strength bilateral upper/lower extremities with 2+ reflexes and no clonus  Sensory: symmetric to light touch, pain, temp, and vibration,  Romberg negative  Cerebellar: Finger-nose,Heal-shin, Rapid alternating movements intact  Gait: Normal stance, no ataxia    Imagin/2019 MRI Brain:   1. Multiple areas of abnormal signal  alteration involving the periventricular white matter best demonstrated on T2/FLAIR imaging compatible with patient's history of multiple sclerosis.  2. New 12 mm lesion within the right parietal lobe with increased signal intensity on diffusion-weighted imaging not present on the previous study from July 24, 2018.    7/24/18 MRI Brain: Multiple areas of increased white matter signal in a pattern suggestive of multiple sclerosis, measuring up to 1 cm in the right frontal periventricular region.  Nothing enhancing    7/24/18 MRI C spine: 1. No evidence demyelinating process within the cervical cord.  2. Mild disc bulges as above from C3-6, greatest at C5-6.    7/24/18 MRI T spine: The thoracic spine demonstrates a normal kyphosis.  There is disc desiccation and loss of disc height from T6-9.  There is a left paracentral disc herniation at T8-9 with contact and mild deformity of the left paracentral cord.  No other focal disc protrusion is noted.  No abnormal cord signal is seen.  No abnormal enhancement is seen within the thoracic cord.  Vertebral body marrow signal is unremarkable.     Labs:   8/2018 Lyme titers, FRENCH, hep panel, HiV, RPRP Ace-level, SSA/SSB antibodies, ESR, and paraneoplastic panel, CMP, CBC unremarkable, Vit D 39, CBC 2018:  white blood cell count is mildly elevated, the platelet count is still elevated, and there is mild anemia.   CMP normal 10/2018  Vit D normal 10/2018  7/2019 CBC-elevated WBCs and increased platelets.     Assessment/Plan: Sandra France is a 34 y.o. female diagnosed with optic neuritis in her right eye in 6/2018 by OCT. Subsequent MRI brain 7/2018 shows multiple chronic demyelinating plaques concerning for Multiple Sclerosis. She has noted some left arm and leg numbness for years, dizziness, fatigue, mood disorder, and urinary symptoms. Imaging and cluster of symptoms are consistent with relapsing and remitting MS given her lesions and symptoms   by time and  space    I recommend:   1. Stop Aimovig, as this was ineffective.   -She failed Botox and Zonegran.   -TCA's cannot be used given her concurrent use of two other antidepressant medications and this may worsen her obesity.   -Increase Topamax to 100 mg bid for headache prevention. Notify me with any intolerable side effects.   -She took this prior for mood and weight loss.   2. Keep a headache log until the  next visit.   3. She is seeing Hematology at [facility] for her leukocytosis and thrombocytosis, which is believed to be related to inflammatory causes thus far.   4. Advised to continue to see Psychiatry for ongoing anxiety. Continue prn Melatonin prn for insomnia. Would avoid TCA's in this patient, given her concurrent use of Wellbutrin and Lexapro and her obesity.   5. She is now followed by Dr. Campos for her MS exclusively. New lesion found on MRI Brain at last visit per this clinic may have occurred just after Copaxone was started. New scans were ordered with contrast, but none of her lesions enhanced, and she remains on Copaxone.   6. She was not given IV steroids at the time of acute ON, but symptoms improved. Labs for MS mimics were normal. She is treated for MS with Copaxone and is tolerating well. LP was not done as her symptoms are rather pathognomonic. Teratogenicity risks of this med reviewed.  7. Continue Vit Supplementation for Vit D deficiency  8. Urology consulted for nocturia. She is taking medication for this and will start PT soon.   9. Smoking cessation is advised to prevent CVA. She has multiple risk factors for CVA including tobacco use, HTN, and birth control use. Birth control pills should not be taken with tobacco use. Discussed elevated platelets and increased risk of thrombotic events with this. Note: she is taking Wellbutrin without effect on her smoking status, though this is taken for anxiety and depression.   10. She is seeing [facility] for pelvic floor therapy. She was referred  for this per Urology prior, but had not been able to find a local provider at that time.      RTC 6 weeks

## 2020-01-30 NOTE — PATIENT INSTRUCTIONS
To increase Topamax to 100 mg twice a day,     Take 1 1/2 tablet of 50 mg twice a day for 1 week, then increase to 1 tablet of 100 mg twice a day afterwards.     Call clinic with any intolerable side effects.

## 2020-02-05 ENCOUNTER — OFFICE VISIT (OUTPATIENT)
Dept: NEUROLOGY | Facility: CLINIC | Age: 35
End: 2020-02-05
Payer: COMMERCIAL

## 2020-02-05 ENCOUNTER — TELEPHONE (OUTPATIENT)
Dept: PHARMACY | Facility: CLINIC | Age: 35
End: 2020-02-05

## 2020-02-05 VITALS
HEIGHT: 59 IN | BODY MASS INDEX: 55.08 KG/M2 | DIASTOLIC BLOOD PRESSURE: 91 MMHG | HEART RATE: 96 BPM | SYSTOLIC BLOOD PRESSURE: 140 MMHG | WEIGHT: 273.25 LBS

## 2020-02-05 DIAGNOSIS — R53.83 FATIGUE, UNSPECIFIED TYPE: ICD-10-CM

## 2020-02-05 DIAGNOSIS — G35 MS (MULTIPLE SCLEROSIS): ICD-10-CM

## 2020-02-05 DIAGNOSIS — Z71.89 COUNSELING REGARDING GOALS OF CARE: ICD-10-CM

## 2020-02-05 DIAGNOSIS — G35 MULTIPLE SCLEROSIS: Primary | ICD-10-CM

## 2020-02-05 DIAGNOSIS — Z86.69 HISTORY OF OPTIC NEURITIS: ICD-10-CM

## 2020-02-05 DIAGNOSIS — Z29.89 PROPHYLACTIC IMMUNOTHERAPY: ICD-10-CM

## 2020-02-05 DIAGNOSIS — Z86.69 HISTORY OF MIGRAINE: ICD-10-CM

## 2020-02-05 DIAGNOSIS — F32.A DEPRESSION, UNSPECIFIED DEPRESSION TYPE: ICD-10-CM

## 2020-02-05 PROCEDURE — 3008F PR BODY MASS INDEX (BMI) DOCUMENTED: ICD-10-PCS | Mod: CPTII,S$GLB,, | Performed by: CLINICAL NURSE SPECIALIST

## 2020-02-05 PROCEDURE — 3080F DIAST BP >= 90 MM HG: CPT | Mod: CPTII,S$GLB,, | Performed by: CLINICAL NURSE SPECIALIST

## 2020-02-05 PROCEDURE — 99999 PR PBB SHADOW E&M-EST. PATIENT-LVL III: CPT | Mod: PBBFAC,,, | Performed by: CLINICAL NURSE SPECIALIST

## 2020-02-05 PROCEDURE — 99999 PR PBB SHADOW E&M-EST. PATIENT-LVL III: ICD-10-PCS | Mod: PBBFAC,,, | Performed by: CLINICAL NURSE SPECIALIST

## 2020-02-05 PROCEDURE — 3080F PR MOST RECENT DIASTOLIC BLOOD PRESSURE >= 90 MM HG: ICD-10-PCS | Mod: CPTII,S$GLB,, | Performed by: CLINICAL NURSE SPECIALIST

## 2020-02-05 PROCEDURE — 99214 PR OFFICE/OUTPT VISIT, EST, LEVL IV, 30-39 MIN: ICD-10-PCS | Mod: S$GLB,,, | Performed by: CLINICAL NURSE SPECIALIST

## 2020-02-05 PROCEDURE — 99214 OFFICE O/P EST MOD 30 MIN: CPT | Mod: S$GLB,,, | Performed by: CLINICAL NURSE SPECIALIST

## 2020-02-05 PROCEDURE — 3077F PR MOST RECENT SYSTOLIC BLOOD PRESSURE >= 140 MM HG: ICD-10-PCS | Mod: CPTII,S$GLB,, | Performed by: CLINICAL NURSE SPECIALIST

## 2020-02-05 PROCEDURE — 3008F BODY MASS INDEX DOCD: CPT | Mod: CPTII,S$GLB,, | Performed by: CLINICAL NURSE SPECIALIST

## 2020-02-05 PROCEDURE — 3077F SYST BP >= 140 MM HG: CPT | Mod: CPTII,S$GLB,, | Performed by: CLINICAL NURSE SPECIALIST

## 2020-02-05 NOTE — PROGRESS NOTES
"Subjective:       Patient ID: Sandra France is a 34 y.o. female who presents today for a routine clinic visit for MS. She was last seen by Dr. Campos on 10/1/19. The history has been provided by the patient.     MS HPI:  · DMT: generic glatiramer   · Side effects from DMT? Yes - minor site reactions   · Taking vitamin D3 as recommended? Yes - Dose: 5000 units daily   · Magnesium glycinate has been a bit helpful. She is sleeping better.   · Migraine--managed by Anabell Arriaga NP. The Topamax has been increased, and she will start 100mg twice daily tomorrow. She does not have a migraine daily, but has had a headache every day for the past 3 years.   · Exercise--she did kickboxing earlier this week.   · She is seeing a PT for pelvic floor therapy. She is still getting up at least once a night to urinate, and she sometimes goes twice.   · Fatigue is not improved. She is still exhausted all the time, and she does not feel like this is "lack of sleep exhausted." She feels a burst of energy in the late morning and sometimes in the late afternoon. After work, she is exhausted. She does not feel like she sleeps better on the nights that she exercises.   · She goes to a chiropractor for her neck.     SOCIAL HISTORY  Social History     Tobacco Use    Smoking status: Current Every Day Smoker     Packs/day: 0.25     Years: 12.00     Pack years: 3.00     Types: Cigarettes    Smokeless tobacco: Never Used   Substance Use Topics    Alcohol use: No    Drug use: Never     Living arrangements - the patient lives with her family-- and two step sons--ages 14 and 19  Employment:  at [employer]     MS REVIEW OF SYMPTOMS 2/4/2020   Do you feel abnormally tired on most days? Yes--as above    Do you feel you generally sleep well? No--as above    Do you have difficulty controlling your bladder?  Yes--getting better; doing pelvic floor therapy; denies UTIs   Do you have difficulty controlling your " "bowels?  No--bowels are regular; also improving    Do you have frequent muscle cramps, tightness or spasms in your limbs?  No--rarely   Do you have new visual symptoms?  No; her eye doctor is Dr. Flako John in [place]    Do you have worsening difficulty with your memory or thinking? Yes--has to write things down a lot more; sometimes "fumbles for words;" repeats things at times    Do you have worsening symptoms of anxiety or depression?  No--mood is up and down, but "overall ok and pretty well controlled on meds." When her headaches are worse, she struggles more.    For patients who walk, Do you have more difficulty walking?  No   Have you fallen since your last visit?  No   For patients who use wheelchairs: Do you have any skin wounds or breakdown? Not Applicable   Do you have difficulty using your hands?  No--hands cramp every now and then, or she feels like her  is not so good    Do you have shooting or burning pain? No; aching pain in legs at times    Do you have difficulty with sexual function?  No   If you are sexually active, are you using birth control? Y/N  N/A Yes   Do you often choke when swallowing liquids or solid food?  No   Do you experience worsening symptoms when overheated? Yes--She gets very hot and flushed; makes headache worse; leg pain will get worse; increased fatigue and worsened mood; cold can sometimes make her aching pain worse   Do you need any new equipment such as a wheelchair, walker or shower chair? No   Do you receive co-pay financial assistance for your principal MS medicine? Yes--$0 copay for glatiramer    Would you be interested in participating in an MS research trial in the future? Yes   For patients on Gilenya, Tecfidera, Aubagio, Rituxan, Ocrevus, Tysabri, Lemtrada or Methotrexate, are you aware that you should NOT receive live virus vaccines?  Not Applicable   Do you feel you have adequate family/friend support?  Yes   Do you have health insurance?   Yes   Are you " currently employed? Yes   Do you receive SSDI/SSI?  Not Applicable   Do you use marijuana or cannabis products? No   Have you been diagnosed with a urinary tract infection since your last visit here? No   Have you been diagnosed with a respiratory tract infection since your last visit here? No   Have you been to the emergency room since your last visit here? No   Have you been hospitalized since your last visit here?  No         Objective:        1. 25 foot timed walk: 4.57 seconds today without assist; was 4.4 seconds in August 2019    Neurologic Exam     Mental Status   Oriented to person, place, and time.   Attention: normal. Concentration: normal.   Speech: speech is normal   Level of consciousness: alert  Knowledge: good.   Normal comprehension.     Cranial Nerves     CN II   Visual acuity: normal (20/25 OD, 20/20 OS)    CN III, IV, VI   Pupils are equal, round, and reactive to light.  Extraocular motions are normal.   Right pupil: Shape: regular. Reactivity: brisk.   Left pupil: Shape: regular. Reactivity: brisk.   CN III: no CN III palsy  CN VI: no CN VI palsy  Nystagmus: none     CN V   Right facial sensation deficit: none  Left facial sensation deficit: none    CN VII   Right facial weakness: none  Left facial weakness: none    CN VIII   Hearing: intact    CN IX, X   Palate: symmetric    CN XI   Right sternocleidomastoid strength: normal  Left sternocleidomastoid strength: normal  Right trapezius strength: normal  Left trapezius strength: normal    CN XII   Tongue deviation: none    Right APD  Color plates 11/18 OD (was 14/18 in August 2019); 18/18 OS      Motor Exam   Muscle bulk: normal  Overall muscle tone: normal    Strength   Strength 5/5 throughout.   Right neck flexion: 5/5  Left neck flexion: 5/5  Right neck extension: 5/5  Left neck extension: 5/5  Right deltoid: 5/5  Left deltoid: 5/5  Right biceps: 5/5  Left biceps: 5/5  Right triceps: 5/5  Left triceps: 5/5  Right wrist flexion: 5/5  Left wrist  flexion: 5/5  Right wrist extension: 5/5  Left wrist extension: 5/5  Right interossei: 5/5  Left interossei: 5/5  Right iliopsoas: 5/5  Left iliopsoas: 5/5  Right quadriceps: 5/5  Left quadriceps: 5/5  Right hamstrin/5  Left hamstrin/5  Right anterior tibial: 5/5  Left anterior tibial: 5/5  Right gastroc: 5/5  Left gastroc: 5/5    Sensory Exam   Right leg light touch: normal  Left leg light touch: normal  Right arm vibration: normal  Left arm vibration: normal  Right leg vibration: decreased from knee  Left leg vibration: decreased from knee  Mildly diminished vibratory sense in bilateral lower extremities      Gait, Coordination, and Reflexes     Gait  Gait: normal    Coordination   Romberg: negative  Finger to nose coordination: normal  Heel to shin coordination: normal  Tandem walking coordination: normal    Tremor   Resting tremor: absent    Reflexes   Right brachioradialis: 2+  Left brachioradialis: 2+  Right biceps: 2+  Left biceps: 2+  Right triceps: 2+  Left triceps: 2+  Right patellar: 2+  Left patellar: 2+  Right achilles: 2+  Left achilles: 2+  Right plantar: equivocal  Left plantar: equivocal  She can walk on toes and heels.   Normal rapid sequential movements in upper and lower extremities         Imaging:     Results for orders placed during the hospital encounter of 19   MRI Brain Demyelinating Without Contrast    Impression 1. Multiple areas of abnormal signal alteration involving the periventricular white matter best demonstrated on T2/FLAIR imaging compatible with patient's history of multiple sclerosis.  2. New 12 mm lesion within the right parietal lobe with increased signal intensity on diffusion-weighted imaging not present on the previous study from 2018.      Electronically signed by: Manny Blandon MD  Date:    2019  Time:    11:28     Results for orders placed during the hospital encounter of 19   MRI Brain Demyelinating W W/O Contrast    Impression  Continued scattered foci of T2 FLAIR signal abnormality supratentorial white matter with ill-defined component in the brittnee.  Allowing for differences in technique there is no evidence for significant new lesion.    While nonspecific remains concerning for mild degree of prior demyelinating plaque in light of history.  There is no definite new lesion or enhancing lesion to suggest significant interval or active demyelination.  Clinical correlation and follow-up advised..    Electronically signed by resident: Michael Yang  Date:    09/30/2019  Time:    11:04    Electronically signed by: Edward Elena DO  Date:    09/30/2019  Time:    11:30     Results for orders placed during the hospital encounter of 09/30/19   MRI Cervical Spine Demyelinating W W/O Contrast    Impression Subtle short-segment STIR signal hyperintensity cervical spinal cord dorsally at the C6 vertebral body level which may be artifactual without corresponding signal abnormality on additional sequences.  Sequela of prior demyelination to be considered in light of history.    No evidence for additional edema signal throughout the remaining cervicothoracic cord.  No abnormal intrathecal enhancement.    Mild degenerative changes as detailed above      Electronically signed by: Edward Elena DO  Date:    09/30/2019  Time:    11:13     Results for orders placed during the hospital encounter of 09/30/19   MRI Thoracic Spine Demyelinating W W/O Contrast    Impression Subtle short-segment STIR signal hyperintensity cervical spinal cord dorsally at the C6 vertebral body level which may be artifactual without corresponding signal abnormality on additional sequences.  Sequela of prior demyelination to be considered in light of history.    No evidence for additional edema signal throughout the remaining cervicothoracic cord.  No abnormal intrathecal enhancement.    Mild degenerative changes as detailed above      Electronically signed by: Edward Elena  DO  Date:    09/30/2019  Time:    11:13       Labs:     Lab Results   Component Value Date    NZTEQWGY30CX 60 09/30/2019    PYWPIZLF04GY 39 08/03/2018     Lab Results   Component Value Date    WBC 13.15 (H) 07/24/2019    RBC 4.60 07/24/2019    HGB 11.8 (L) 07/24/2019    HCT 37.1 07/24/2019    MCV 81 (L) 07/24/2019    MCH 25.7 (L) 07/24/2019    MCHC 31.8 (L) 07/24/2019    RDW 14.9 (H) 07/24/2019     (H) 07/24/2019    MPV 9.1 (L) 07/24/2019    GRAN 9.2 (H) 07/24/2019    GRAN 70.1 07/24/2019    LYMPH 2.8 07/24/2019    LYMPH 21.0 07/24/2019    MONO 0.9 07/24/2019    MONO 6.5 07/24/2019    EOS 0.2 07/24/2019    BASO 0.07 07/24/2019    EOSINOPHIL 1.4 07/24/2019    BASOPHIL 0.5 07/24/2019     Sodium   Date Value Ref Range Status   08/03/2018 139 136 - 145 mmol/L Final     Potassium   Date Value Ref Range Status   08/03/2018 4.3 3.5 - 5.1 mmol/L Final     Chloride   Date Value Ref Range Status   08/03/2018 106 95 - 110 mmol/L Final     CO2   Date Value Ref Range Status   08/03/2018 23 23 - 29 mmol/L Final     Glucose   Date Value Ref Range Status   08/03/2018 86 70 - 110 mg/dL Final     BUN, Bld   Date Value Ref Range Status   08/03/2018 13 6 - 20 mg/dL Final     Creatinine   Date Value Ref Range Status   08/03/2018 0.7 0.5 - 1.4 mg/dL Final     Calcium   Date Value Ref Range Status   08/03/2018 9.3 8.7 - 10.5 mg/dL Final     Total Protein   Date Value Ref Range Status   08/03/2018 7.3 6.0 - 8.4 g/dL Final     Albumin   Date Value Ref Range Status   08/03/2018 3.4 (L) 3.5 - 5.2 g/dL Final     Total Bilirubin   Date Value Ref Range Status   08/03/2018 0.3 0.1 - 1.0 mg/dL Final     Comment:     For infants and newborns, interpretation of results should be based  on gestational age, weight and in agreement with clinical  observations.  Premature Infant recommended reference ranges:  Up to 24 hours.............<8.0 mg/dL  Up to 48 hours............<12.0 mg/dL  3-5 days..................<15.0 mg/dL  6-29  days.................<15.0 mg/dL       Alkaline Phosphatase   Date Value Ref Range Status   08/03/2018 88 55 - 135 U/L Final     AST   Date Value Ref Range Status   08/03/2018 15 10 - 40 U/L Final     ALT   Date Value Ref Range Status   08/03/2018 16 10 - 44 U/L Final     Anion Gap   Date Value Ref Range Status   08/03/2018 10 8 - 16 mmol/L Final     eGFR if    Date Value Ref Range Status   08/03/2018 >60.0 >60 mL/min/1.73 m^2 Final     eGFR if non    Date Value Ref Range Status   08/03/2018 >60.0 >60 mL/min/1.73 m^2 Final     Comment:     Calculation used to obtain the estimated glomerular filtration  rate (eGFR) is the CKD-EPI equation.            Diagnosis/Assessment/Plan:    1. Multiple Sclerosis  · Assessment: Sandra is clinically stable today, except for slightly worse color vision in right eye compared to August 2019 visit. Perhaps her headache at level 5 today is impacting this. She denies any eye pain or change in acuity. We will monitor this closely.   · Imaging: MRI brain September 2020   · Disease Modifying Therapies: Continue Copaxone and Vitamin D. Will check Vitamin D level at next visit.     2. MS Symptom Assessment / Management  · Fatigue: recommend Co-Q-10 500mg daily; also discussed modafinil  · Sleep Disturbance: Continue magnesium  · Visual Symptoms: Will monitor; color vision is a bit worse today  · Cognitive: Will monitor  · Mood Disorder: Continue medications as prescribed.   · Heat sensitivity: Will discuss cooling vest at next visit     Our visit today lasted 30 minutes, and 100% of this time was spent face to face with the patient. Over 50% of this visit included discussion of the treatment plan/medication changes/symptom management/exam findings/imaging results/coordination of care. The patient agrees with the plan of care. I will see her back in 3 months.       Kailey Sommers, AGCNS-BC, MSCN    Problem List Items Addressed This Visit     None

## 2020-02-05 NOTE — ASSESSMENT & PLAN NOTE
Exam stable, except for slightly worse color vision in right eye. No other signs/symptoms of active optic neuritis. Continue Copaxone and Vitamin D.

## 2020-02-06 ENCOUNTER — PATIENT MESSAGE (OUTPATIENT)
Dept: NEUROLOGY | Facility: CLINIC | Age: 35
End: 2020-02-06

## 2020-03-05 ENCOUNTER — TELEPHONE (OUTPATIENT)
Dept: PHARMACY | Facility: CLINIC | Age: 35
End: 2020-03-05

## 2020-03-16 ENCOUNTER — OFFICE VISIT (OUTPATIENT)
Dept: UROGYNECOLOGY | Facility: CLINIC | Age: 35
End: 2020-03-16
Payer: COMMERCIAL

## 2020-03-16 DIAGNOSIS — N39.46 MIXED INCONTINENCE: Primary | ICD-10-CM

## 2020-03-16 PROCEDURE — 99213 PR OFFICE/OUTPT VISIT, EST, LEVL III, 20-29 MIN: ICD-10-PCS | Mod: 95,,, | Performed by: OBSTETRICS & GYNECOLOGY

## 2020-03-16 PROCEDURE — 99213 OFFICE O/P EST LOW 20 MIN: CPT | Mod: 95,,, | Performed by: OBSTETRICS & GYNECOLOGY

## 2020-03-16 RX ORDER — FESOTERODINE FUMARATE 4 MG/1
4 TABLET, EXTENDED RELEASE ORAL DAILY
Qty: 30 TABLET | Refills: 11 | Status: SHIPPED | OUTPATIENT
Start: 2020-03-16 | End: 2021-03-30

## 2020-03-16 NOTE — PATIENT INSTRUCTIONS
1. Hx of MS: overall stable  We reviewed that bladder dysfunction can be seen in the majority of patients with MS. The symptoms range from not being able to emptying to urinary frequency and urinary incontinence.  This may be as a results of  block or delay transmission of nerve signals in areas of the central nervous system (CNS)  that control the bladder and urinary sphincters due to the MS brain lesions. A spastic (overactive) bladder that is unable to hold the normal amount of urine, or a bladder that does not empty properly (retains some urine in it). Many symptoms are associated with MS vary and sometimes need additional testing to better evaluate these urinary problems.    Frequency and/or urgency of urination   Hesitancy in starting urination   Frequent nighttime urination (nocturia)   Incontinence (the inability to hold in urine)   Inability to empty the bladder completely    2.   Mixed urinary incontinence, urge = stress: overall stress leakage better  --Empty bladder every 3 hours.  Empty well: wait a minute, lean forward on toilet.    --Avoid dietary irritants (see sheet).  Keep diary x 3-5 days to determine your irritants.  --KEGELS: do 10 in AM and 10 in PM, holding each x 10 seconds.  When you feel urge to go, STOP, KEGEL, and when urge has passed, then go to bathroom. Continue pelvic floor PT   --URGE: trial of toviaz 4 mg  .  SE profile reviewed.  Takes 2-4 weeks to see if will have effect.  For dry mouth: get sour, sugar free lozenge or gum.    --STRESS:  Pessary vs. Sling vs impressa    3. --Nocturia (nighttime urination): stop fluids 2 hours before bed.  If have leg swelling:  Elevate feet above chest x 1 hour before bed to get excess fluid off.  Can also use support hose (knee highs).

## 2020-03-16 NOTE — PROGRESS NOTES
Subjective:       Patient ID: Sandra France is a 34 y.o. female.    Chief Complaint:  No chief complaint on file.      History of Present Illness  HPI 34 Y OF  has a past medical history of Anemia, Anxiety, Anxiety, Back pain, Bleeds easily, Change in bowel habit, Changes in skin texture, Confusion, Depression, Depression, Dizziness, Dry skin, Fatigue, Has daytime drowsiness, Headache, Heartburn, History of weakness of extremity, HTN (hypertension), psychiatric care, Hyperthyroidism, Insomnia, Lethargy, Loose stools, Memory loss, Migraine, MS (multiple sclerosis), Neck pain, Neuropathy, Numbness of extremity, Optic neuritis, PCOS (polycystic ovarian syndrome), Psychiatric problem, Rash, Restless sleeper, Stomach pain, Therapy, Thyroid disease, Thyroid disease, Tingling in extremities, Tiredness, Trouble walking, Unsteady gait, Urine troubles, Voiding dysfunction, Wheezing, and Worries. referred by Dr. Campos for evaluation of urinary urgency and urge incontinence.       Interval  HP since the last visit   1)  UI:  (--) JONO: much improved  (+) UUI  (+) pads: 1 .  Daytime frequency: Q 4 -5 hours.  Nocturia: now 1:    (--) dysuria-  (--) hematuria,  (--) frequent UTIs.  (+) complete bladder emptying.     2)  POP:  Absent Symptoms:(--)  .  (--) vaginal bleeding. (--) vaginal discharge. (+) sexually active.  (--) dyspareunia.   (--)  Vaginal dryness.  (--) vaginal estrogen use.    3)  BM:  (-) constipation/straining.  (--) chronic diarrhea. (--) hematochezia.  (--) fecal incontinence.  (--) fecal smearing/urgency.  (--) incomplete evacuation.        [facility] Hpi     Question 9/15/2019  7:17 PM CDT - Filed by Patient on 9/15/2019   General Urogynecology: Are you experiencing the following?    Dysuria (painful urination) No   Nocturia:  waking up at night to empty your bladder  Yes   If you answered yes to the previous question, how many times does this happen per night? 1-2   Enuresis (urine loss during  "sleep) Yes   Dribbling urine after you urinate Yes   Hematuria (urine appears red) No   Type of stream Strong   Urinary frequency: How often a day are you going to the bathroom per day?  10-15   Urinary Tract Infections: How many Urinary Tract Infections have you had in the past year? I have not had a UTI in the past year   If you have had a UTI in the past year, what treatments have you had so far?  I have not had a UTI in the past year   Urinary Incontinence (General): Are you experiencing the following?    Past consultation for incontinence: Have you ever seen someone for the evaluation of incontinence? No   If you answered yes to the previous question, please select all the therapies you have tried.  N/a- I answered no to the previous question    None of the above   Please note the effectiveness of the therapies.    Need to wear protection to keep clothes dry  Yes   If you answered yes to the previous question, please ck the protection you use.  Pads   If you wear protection, how much wetness is typically on each pad? Light   If you wear protection, how often do you have to change per day, if applicable?  1   Stress Symptoms: Are you experiencing the following?    Leakage of urine with cough, laugh and/or sneeze Yes   If you answered yes to the previous question, what is the frequency in days, weeks and/or months? Daily   Leakage of urine with sex Yes   Leakage of urine with bending/ lifting Yes   Leakage of urine with briskly walking or jogging Yes   If you lose urine for any other reason not previously mentioned, please note it below, if applicable.     Urge Symptoms: Are you experiencing the following?    Urgency ("got to go" feeling) Yes   Urge: How frequently do you feel an urge to urinate (feeling like you "gotta go" to the bathroom and can't wait) Several times a day   Do you experience a leakage of urine when you have a feeling of urgency?  Yes   Leakage of urine when unaware Yes   Past use of " anticholinergics (medications used to treat overactive bladder) No   If you answered yes to the previous question, please ck the anticholinergics you have used:     Have you ever used Mirbetriq (aka Mirabegron)?  No   Prolapse Symptoms: Are you experiencing any of the following?     Falling out/ Bulging/ Heaviness in the vagina No   Vaginal/ Abdominal Pain/ Pressure Yes   Need to strain/ Push to void Yes   Need to wait on the toilet before you void Yes   Unusual position to urinate (using your hands to push back the vaginal bulge) Yes   Sensation of incomplete emptying Yes   Past use of pessary device No   If you answered yes to the previous question, please list the devices you have used below.     Bowel Symptoms: Are you experiencing any of the following?    Constipation Yes   Diarrhea  Yes   Hematochezia (bloody stool) No   Incomplete evacuation of stool Yes   Involuntary loss of formed stool Yes   Fecal smearing/urgency Yes   Involuntary loss of gas Yes   Vaginal Symptoms: Are you experiencing any of the following?     Abnormal vaginal bleeding  No   Vaginal dryness No   Sexually active  Yes   Dyspareunia (painful intercourse) No   Estrogen use  Yes       GYN & OB History  No LMP recorded.   Date of Last Pap: 2018    OB History    Para Term  AB Living   1             SAB TAB Ectopic Multiple Live Births                  # Outcome Date GA Lbr Fili/2nd Weight Sex Delivery Anes PTL Lv   1               OB  Largest: None   Forceps:  Episiotomy:      GYN  Menarche: 12  Menstrual cycle: -5/moderate / cramping   Menopause: n/a  Hysterectomy: No   Ovaries: present     Review of Systems  Review of Systems   Constitutional: Negative.  Negative for activity change, appetite change, chills, diaphoresis, fatigue, fever and unexpected weight change.   HENT: Negative.    Eyes: Negative.    Respiratory: Negative.  Negative for apnea, cough and wheezing.    Cardiovascular: Negative.  Negative for  chest pain and palpitations.   Gastrointestinal: Negative for abdominal distention, abdominal pain, anal bleeding, blood in stool, constipation, diarrhea, nausea, rectal pain and vomiting.   Endocrine: Negative.    Genitourinary: Positive for frequency and urgency. Negative for decreased urine volume, difficulty urinating, dyspareunia, dysuria, enuresis, flank pain, genital sores, hematuria, menstrual problem, pelvic pain, vaginal bleeding, vaginal discharge and vaginal pain.   Musculoskeletal: Negative for back pain and gait problem.   Skin: Negative for color change, pallor, rash and wound.   Allergic/Immunologic: Negative for immunocompromised state.   Neurological: Negative.  Negative for dizziness and speech difficulty.   Hematological: Negative for adenopathy.   Psychiatric/Behavioral: Negative for agitation, behavioral problems, confusion and sleep disturbance.           Objective:     Physical Exam   Constitutional: She is oriented to person, place, and time. She appears well-developed.   HENT:   Head: Normocephalic and atraumatic.   Eyes: Conjunctivae and EOM are normal.   Neck: Normal range of motion. Neck supple.   Cardiovascular: Normal rate, regular rhythm, S1 normal, S2 normal, normal heart sounds and intact distal pulses.   Pulmonary/Chest: Effort normal and breath sounds normal. She exhibits no tenderness.   Abdominal: Soft. Bowel sounds are normal. She exhibits no distension and no mass. There is no hepatosplenomegaly, splenomegaly or hepatomegaly. There is no tenderness. There is no rigidity, no rebound, no guarding and no CVA tenderness. No hernia.   Genitourinary: Pelvic exam was performed with patient supine. Rectum normal, vagina normal, uterus normal, cervix normal, skenes normal and bartholins normal. Right labia normal and left labia normal. Urethra exhibits hypermobility. Urethra exhibits no urethral caruncle, no urethral diverticulum and no urethral mass. Right bartholin is not enlarged  and not tender. Left bartholin is not enlarged and not tender. Rectal exam shows resting tone normal and active tone normal. Rectal exam shows no external hemorrhoid, no fissure, no tenderness, anal tone normal and no dovetailing. Guaiac negative stool. There is atrophy in the vagina. No foreign body, tenderness, bleeding, unspecified prolapse of vaginal walls, fistula, mesh exposure or lavator tenderness in the vagina. No vaginal discharge found. Right adnexum displays no mass and no tenderness. Left adnexum displays no mass and no tenderness. Cervix exhibits no motion tenderness and no discharge. Uterus is not tender and not experiencing uterine prolapse.   PVR: 50 ML  Empty cough stress test: Negative.  Kegel: 3/5    POP-Q  Aa: -2 Ba: -2 C: -5   GH: 3 PB: 2 TVL: 8   Ap: -2 Bp: -2 D: -6                     Musculoskeletal: Normal range of motion.   Lymphadenopathy:     She has no axillary adenopathy.        Right: No inguinal adenopathy present.        Left: No inguinal adenopathy present.   Neurological: She is alert and oriented to person, place, and time. She has normal strength and normal reflexes. Cranial nerves II through XII intact. No cranial nerve deficit.   Skin: Skin is warm, dry and intact.   Psychiatric: She has a normal mood and affect. Her speech is normal and behavior is normal. Judgment normal. Cognition and memory are normal.           HCM  Pap's: normal,   Mammo: n/a  Colonoscopy: no  Dexa:no        Assessment:        1. Mixed incontinence             Plan:      1. Hx of MS:   We reviewed that bladder dysfunction can be seen in the majority of patients with MS. The symptoms range from not being able to emptying to urinary frequency and urinary incontinence.  This may be as a results of  block or delay transmission of nerve signals in areas of the central nervous system (CNS)  that control the bladder and urinary sphincters due to the MS brain lesions. A spastic (overactive) bladder that is  unable to hold the normal amount of urine, or a bladder that does not empty properly (retains some urine in it). Many symptoms are associated with MS vary and sometimes need additional testing to better evaluate these urinary problems.    Frequency and/or urgency of urination   Hesitancy in starting urination   Frequent nighttime urination (nocturia)   Incontinence (the inability to hold in urine)   Inability to empty the bladder completely    2.   Mixed urinary incontinence, urge = stress: overall stress leakage better  --Empty bladder every 3 hours.  Empty well: wait a minute, lean forward on toilet.    --Avoid dietary irritants (see sheet).  Keep diary x 3-5 days to determine your irritants.  --KEGELS: do 10 in AM and 10 in PM, holding each x 10 seconds.  When you feel urge to go, STOP, KEGEL, and when urge has passed, then go to bathroom. Continue pelvic floor PT   --URGE: trial of toviaz 4 mg  .  SE profile reviewed.  Takes 2-4 weeks to see if will have effect.  For dry mouth: get sour, sugar free lozenge or gum.    --STRESS:  Pessary vs. Sling vs impressa    3. --Nocturia (nighttime urination): stop fluids 2 hours before bed.  If have leg swelling:  Elevate feet above chest x 1 hour before bed to get excess fluid off.  Can also use support hose (knee highs).    Pa Campbell DO  Female Pelvic Medicine and Reconstructive Surgery  [facility] [place], [place]

## 2020-04-13 ENCOUNTER — TELEPHONE (OUTPATIENT)
Dept: PHARMACY | Facility: CLINIC | Age: 35
End: 2020-04-13

## 2020-04-22 NOTE — LETTER
August 30, 2019      Anabell Arriaga, NP  [address] [facility] [address]           [facility]  [address]  [facility] [address]  Phone: [phone]          Patient: Sandra Castelan   MR Number: [id number]   YOB: 1985   Date of Visit: 8/28/2019       Dear Anabell Arriaga:    Thank you for referring Sandra Castelan to me for evaluation. Attached you will find relevant portions of my assessment and plan of care.    If you have questions, please do not hesitate to call me. I look forward to following Sandra Castelan along with you.    Sincerely,    Nel Campos MD    Enclosure  CC:  No Recipients    If you would like to receive this communication electronically, please contact [email or web address] or [phone] to request more information on [employer] access.    For providers and/or their staff who would like to refer a patient to [facility], please contact us through our one-stop-shop provider referral line, [facility], at [phone].    If you feel you have received this communication in error or would no longer like to receive these types of communications, please e-mail [email or web address]          Refill sent for prenatal vitamin.

## 2020-04-30 ENCOUNTER — PATIENT MESSAGE (OUTPATIENT)
Dept: NEUROLOGY | Facility: CLINIC | Age: 35
End: 2020-04-30

## 2020-04-30 DIAGNOSIS — R39.9 UTI SYMPTOMS: Primary | ICD-10-CM

## 2020-04-30 DIAGNOSIS — G35 MULTIPLE SCLEROSIS: ICD-10-CM

## 2020-05-04 ENCOUNTER — LAB VISIT (OUTPATIENT)
Dept: LAB | Facility: HOSPITAL | Age: 35
End: 2020-05-04
Attending: CLINICAL NURSE SPECIALIST
Payer: COMMERCIAL

## 2020-05-04 ENCOUNTER — OFFICE VISIT (OUTPATIENT)
Dept: NEUROLOGY | Facility: CLINIC | Age: 35
End: 2020-05-04
Payer: COMMERCIAL

## 2020-05-04 VITALS — HEIGHT: 59 IN | WEIGHT: 270 LBS | BODY MASS INDEX: 54.43 KG/M2

## 2020-05-04 DIAGNOSIS — M79.2 NEUROPATHIC PAIN: ICD-10-CM

## 2020-05-04 DIAGNOSIS — R39.9 UTI SYMPTOMS: ICD-10-CM

## 2020-05-04 DIAGNOSIS — G35 MULTIPLE SCLEROSIS: Primary | ICD-10-CM

## 2020-05-04 DIAGNOSIS — G35 MULTIPLE SCLEROSIS: ICD-10-CM

## 2020-05-04 DIAGNOSIS — R68.89 HEAT SENSITIVITY: ICD-10-CM

## 2020-05-04 DIAGNOSIS — R41.89 COGNITIVE CHANGE: ICD-10-CM

## 2020-05-04 DIAGNOSIS — Z71.89 COUNSELING REGARDING GOALS OF CARE: ICD-10-CM

## 2020-05-04 DIAGNOSIS — Z29.89 PROPHYLACTIC IMMUNOTHERAPY: ICD-10-CM

## 2020-05-04 LAB
BILIRUB UR QL STRIP: NEGATIVE
CLARITY UR: CLEAR
COLOR UR: YELLOW
GLUCOSE UR QL STRIP: NEGATIVE
HGB UR QL STRIP: NEGATIVE
KETONES UR QL STRIP: NEGATIVE
LEUKOCYTE ESTERASE UR QL STRIP: NEGATIVE
NITRITE UR QL STRIP: NEGATIVE
PH UR STRIP: 6 [PH] (ref 5–8)
PROT UR QL STRIP: NEGATIVE
SP GR UR STRIP: >=1.03 (ref 1–1.03)
URN SPEC COLLECT METH UR: ABNORMAL
UROBILINOGEN UR STRIP-ACNC: NEGATIVE EU/DL

## 2020-05-04 PROCEDURE — 99213 PR OFFICE/OUTPT VISIT, EST, LEVL III, 20-29 MIN: ICD-10-PCS | Mod: 95,,, | Performed by: CLINICAL NURSE SPECIALIST

## 2020-05-04 PROCEDURE — 81003 URINALYSIS AUTO W/O SCOPE: CPT

## 2020-05-04 PROCEDURE — 99213 OFFICE O/P EST LOW 20 MIN: CPT | Mod: 95,,, | Performed by: CLINICAL NURSE SPECIALIST

## 2020-05-04 PROCEDURE — 3008F PR BODY MASS INDEX (BMI) DOCUMENTED: ICD-10-PCS | Mod: CPTII,,, | Performed by: CLINICAL NURSE SPECIALIST

## 2020-05-04 PROCEDURE — 3008F BODY MASS INDEX DOCD: CPT | Mod: CPTII,,, | Performed by: CLINICAL NURSE SPECIALIST

## 2020-05-04 RX ORDER — GABAPENTIN 100 MG/1
100 CAPSULE ORAL 3 TIMES DAILY
Qty: 90 CAPSULE | Refills: 0 | Status: SHIPPED | OUTPATIENT
Start: 2020-05-04 | End: 2020-08-19

## 2020-05-04 RX ORDER — AA/PROT/LYSINE/METHIO/VIT C/B6 50-12.5 MG
500 TABLET ORAL DAILY
COMMUNITY
End: 2021-04-01

## 2020-05-04 NOTE — PROGRESS NOTES
"Subjective:          Patient ID: Sandra France is a 34 y.o. female who presents today for a fit-in clinic visit for MS.  She was last seen in February 2020. The history has been provided by the patient. Today's visit is a virtual visit.     The patient location is: her workplace   The chief complaint leading to consultation is: Multiple Sclerosis   Visit type: audiovisual  Total time spent with patient: 22 minutes   Each patient to whom he or she provides medical services by telemedicine is:  (1) informed of the relationship between the physician and patient and the respective role of any other health care provider with respect to management of the patient; and (2) notified that he or she may decline to receive medical services by telemedicine and may withdraw from such care at any time.      MS HPI:  · DMT: glatiramer acetate  · Side effects from DMT? No  · Taking vitamin D3 as recommended? Yes -  Dose: 5000 units daily   · She quit smoking.   · She has a headache rated 7/10 today and bilateral aching in her leg rated 4/10. She has had this periodically since her diagnosis. Ibuprofen or Tylenol does not help. She sometimes has sensitivity to touch on her skin.   · She started a new birth control on 4/4.   · She has felt more intense brain fog lately and has a hard time focusing. If she is doing something and gets interrupted, she completely loses track of what she is doing. She has noticed it most profoundly in the past 1.5 weeks, but it had been going on prior to that as well. She feels slightly dizzy and light-headed all the time. She has felt off balance. She denies any falls or trouble walking. She feels as if her blood pressure would be low or blood sugar would be high, but this isn't happening. She "just feels off." Her anxiety has been "up and down" and has been "way worse" than what it normally is. She has also felt moodier and has been snapping at her  more. She feels tightness in her chest " when her anxiety is high.   · She denies any fevers or signs of UTI. She denies any loose stools or other GI issues. She denies any cough or SOB.     Medications:  Current Outpatient Medications   Medication Sig    buPROPion (WELLBUTRIN XL) 300 MG 24 hr tablet Take 1 tablet (300 mg total) by mouth once daily.    ergocalciferol, vitamin D2, (VITAMIN D ORAL) Take 5,000 Units by mouth once daily.     escitalopram oxalate (LEXAPRO) 10 MG tablet Take 10 mg by mouth once daily.    FERROUS SULFATE ORAL Take 1 tablet by mouth once daily.     fesoterodine (TOVIAZ) 4 mg Tb24 Take 1 tablet (4 mg total) by mouth once daily.    glatiramer (COPAXONE, GLATOPA) 40 mg/mL injection Inject 40 mg into the skin 3 (three) times a week.    ibuprofen (ADVIL,MOTRIN) 200 MG tablet Take 800 mg by mouth daily as needed for Pain.    levothyroxine (SYNTHROID) 75 MCG tablet Take 75 mcg by mouth before breakfast.     liothyronine (CYTOMEL) 5 MCG Tab Take 10 mcg by mouth once daily.     lisinopril 10 MG tablet Take 10 mg by mouth once daily.    magnesium glycinate 100 mg Tab Take 400 mg by mouth once.    omeprazole/sodium bicarbonate (ZEGERID OTC ORAL) Take 1 tablet by mouth 2 (two) times daily as needed.     topiramate (TOPAMAX) 100 MG tablet Take 1 tablet (100 mg total) by mouth 2 (two) times daily.    TRI-LO-ANAID 0.18/0.215/0.25 mg-25 mcg tablet Take 1 tablet by mouth once daily.      No current facility-administered medications for this visit.        SOCIAL HISTORY  Social History     Tobacco Use    Smoking status: Current Every Day Smoker     Packs/day: 0.25     Years: 12.00     Pack years: 3.00     Types: Cigarettes    Smokeless tobacco: Never Used   Substance Use Topics    Alcohol use: No    Drug use: Never     Living arrangements - the patient lives with her family-- and two step sons--ages 14 and 19  ROS:    REVIEW OF SYMPTOMS 5/4/2020   Do you feel abnormally tired on most days? Yes--Energy level has been  "decreasing in the last few weeks. She thinks that Co-Q-10 had been helpful, but not so much recently.    Do you feel you generally sleep well? No--she falls asleep pretty easily. She wakes up at least once and can usually fall back asleep;sometimes she is not able to fall back asleep. When she can't get back to sleep, her anxiety kicks in.    Do you have difficulty controlling your bladder?  She has had some hesitancy in the last 3-4 days. She denies any urgency or frequency.    Do you have difficulty controlling your bowels?  No   Do you have frequent muscle cramps, tightness or spasms in your limbs?  No--occasionally in legs    Do you have new visual symptoms?  No--her vision feels "off" when she is overwhelmed or stressed; comes and goes, but seems more often lately   Do you have worsening difficulty with your memory or thinking? Yes--had trouble spelling her last name recently, and she was fully aware in this moment; she continues to have some word finding problems.    Do you have worsening symptoms of anxiety or depression?  She has anxiety and admits to feeling more depressed than she normally does    For patients who walk, Do you have more difficulty walking?  No   Have you fallen since your last visit?  No   For patients who use wheelchairs: Do you have any skin wounds or breakdown? Not Applicable   Do you have difficulty using your hands?  No   Do you have shooting or burning pain? As above; also has occasional neck pain and low back pain; she also has some tingling in her feet and hands that comes and goes    Do you have difficulty with sexual function?  No   If you are sexually active, are you using birth control? Y/N  N/A Yes   Do you often choke when swallowing liquids or solid food?  No   Do you experience worsening symptoms when overheated? Yes--feels "terrible," more irritable, and more tired when overheated    Do you need any new equipment such as a wheelchair, walker or shower chair? No   Do you " receive co-pay financial assistance for your principal MS medicine? Yes   Would you be interested in participating in an MS research trial in the future? Yes   For patients on Gilenya, Tecfidera, Aubagio, Rituxan, Ocrevus, Tysabri, Lemtrada or Methotrexate, are you aware that you should NOT receive live virus vaccines?  Not Applicable   Do you feel you have adequate family/friend support?  Yes   Do you have health insurance?   Yes   Are you currently employed? Yes   Do you receive SSDI/SSI?  Not Applicable   Do you use marijuana or cannabis products? No   Have you been diagnosed with a urinary tract infection since your last visit here? No   Have you been diagnosed with a respiratory tract infection since your last visit here? No   Have you been to the emergency room since your last visit here? No   Have you been hospitalized since your last visit here?  No                Objective:        Neuro exam deferred today  Neurologic Exam      Imaging:     Results for orders placed during the hospital encounter of 07/31/19     Results for orders placed during the hospital encounter of 09/30/19   MRI Brain Demyelinating W W/O Contrast    Impression Continued scattered foci of T2 FLAIR signal abnormality supratentorial white matter with ill-defined component in the brittnee.  Allowing for differences in technique there is no evidence for significant new lesion.    While nonspecific remains concerning for mild degree of prior demyelinating plaque in light of history.  There is no definite new lesion or enhancing lesion to suggest significant interval or active demyelination.  Clinical correlation and follow-up advised..    Electronically signed by resident: Michael Yang  Date:    09/30/2019  Time:    11:04    Electronically signed by: Edward Elena DO  Date:    09/30/2019  Time:    11:30     Results for orders placed during the hospital encounter of 09/30/19   MRI Cervical Spine Demyelinating W W/O Contrast    Impression  Subtle short-segment STIR signal hyperintensity cervical spinal cord dorsally at the C6 vertebral body level which may be artifactual without corresponding signal abnormality on additional sequences.  Sequela of prior demyelination to be considered in light of history.    No evidence for additional edema signal throughout the remaining cervicothoracic cord.  No abnormal intrathecal enhancement.    Mild degenerative changes as detailed above      Electronically signed by: Edward Elena DO  Date:    09/30/2019  Time:    11:13     Results for orders placed during the hospital encounter of 09/30/19   MRI Thoracic Spine Demyelinating W W/O Contrast    Impression Subtle short-segment STIR signal hyperintensity cervical spinal cord dorsally at the C6 vertebral body level which may be artifactual without corresponding signal abnormality on additional sequences.  Sequela of prior demyelination to be considered in light of history.    No evidence for additional edema signal throughout the remaining cervicothoracic cord.  No abnormal intrathecal enhancement.    Mild degenerative changes as detailed above      Electronically signed by: Edward Elena DO  Date:    09/30/2019  Time:    11:13         Labs:     Lab Results   Component Value Date    SHPYCMNC91PG 60 09/30/2019    BCUVPVJL34HT 39 08/03/2018     Lab Results   Component Value Date    WBC 13.15 (H) 07/24/2019    RBC 4.60 07/24/2019    HGB 11.8 (L) 07/24/2019    HCT 37.1 07/24/2019    MCV 81 (L) 07/24/2019    MCH 25.7 (L) 07/24/2019    MCHC 31.8 (L) 07/24/2019    RDW 14.9 (H) 07/24/2019     (H) 07/24/2019    MPV 9.1 (L) 07/24/2019    GRAN 9.2 (H) 07/24/2019    GRAN 70.1 07/24/2019    LYMPH 2.8 07/24/2019    LYMPH 21.0 07/24/2019    MONO 0.9 07/24/2019    MONO 6.5 07/24/2019    EOS 0.2 07/24/2019    BASO 0.07 07/24/2019    EOSINOPHIL 1.4 07/24/2019    BASOPHIL 0.5 07/24/2019     Sodium   Date Value Ref Range Status   08/03/2018 139 136 - 145 mmol/L Final      Potassium   Date Value Ref Range Status   08/03/2018 4.3 3.5 - 5.1 mmol/L Final     Chloride   Date Value Ref Range Status   08/03/2018 106 95 - 110 mmol/L Final     CO2   Date Value Ref Range Status   08/03/2018 23 23 - 29 mmol/L Final     Glucose   Date Value Ref Range Status   08/03/2018 86 70 - 110 mg/dL Final     BUN, Bld   Date Value Ref Range Status   08/03/2018 13 6 - 20 mg/dL Final     Creatinine   Date Value Ref Range Status   08/03/2018 0.7 0.5 - 1.4 mg/dL Final     Calcium   Date Value Ref Range Status   08/03/2018 9.3 8.7 - 10.5 mg/dL Final     Total Protein   Date Value Ref Range Status   08/03/2018 7.3 6.0 - 8.4 g/dL Final     Albumin   Date Value Ref Range Status   08/03/2018 3.4 (L) 3.5 - 5.2 g/dL Final     Total Bilirubin   Date Value Ref Range Status   08/03/2018 0.3 0.1 - 1.0 mg/dL Final     Comment:     For infants and newborns, interpretation of results should be based  on gestational age, weight and in agreement with clinical  observations.  Premature Infant recommended reference ranges:  Up to 24 hours.............<8.0 mg/dL  Up to 48 hours............<12.0 mg/dL  3-5 days..................<15.0 mg/dL  6-29 days.................<15.0 mg/dL       Alkaline Phosphatase   Date Value Ref Range Status   08/03/2018 88 55 - 135 U/L Final     AST   Date Value Ref Range Status   08/03/2018 15 10 - 40 U/L Final     ALT   Date Value Ref Range Status   08/03/2018 16 10 - 44 U/L Final     Anion Gap   Date Value Ref Range Status   08/03/2018 10 8 - 16 mmol/L Final     eGFR if    Date Value Ref Range Status   08/03/2018 >60.0 >60 mL/min/1.73 m^2 Final     eGFR if non    Date Value Ref Range Status   08/03/2018 >60.0 >60 mL/min/1.73 m^2 Final     Comment:     Calculation used to obtain the estimated glomerular filtration  rate (eGFR) is the CKD-EPI equation.        Lab Results   Component Value Date    HEPBSAG Negative 08/03/2018           MS Impression and Plan:     NEURO  MULTIPLE SCLEROSIS IMPRESSION:   Plan:     DMT:  No change in management    DMT comment:  Continue glatiramer and Vitamin D.    Symptom Management:  Implement change in symptom management    Implement Change in Symptom Management:  Heat Sensitivity and Pain (Order and application for cooling vest mailed to patient. For uncomfortable tingling in hands and feet, we will try gabapentin 100mg. I'd like for her to start with just one at night, but she can take 100mg three times daily if needed and tolerated. )       Sandra has had some worsening symptoms, mostly cognitive, in the past few weeks that are worrisome to her. She has also felt off balance. She admits to more anxiety lately, which might be exacerbating this issue. She did start a new birth control a month ago, and I wonder if that might be contributing to these symptoms, as well. However, MS relapse is definitely a consideration. She had labs done with her PCP last week that were unremarkable, and UA done earlier today was also unremarkable. Since she does not have an obvious infection, we will proceed new brain MRI to explore for presence of any new or active lesions. I will see her back in 4 weeks.       Our virtual visit today lasted 22 minutes, and 100% of this time was spent face to face with the patient. Over 50% of this visit included discussion of the treatment plan/medication changes/symptom management/coordination of care. The patient agrees with the plan of care.    NICHELLE Navarro, CNS     Problem List Items Addressed This Visit     None      Visit Diagnoses     Multiple sclerosis    -  Primary    Relevant Medications    gabapentin (NEURONTIN) 100 MG capsule    Other Relevant Orders    MRI Brain Demyelinating W W/O Contrast    HME - OTHER    Heat sensitivity        Relevant Orders    HME - OTHER

## 2020-05-06 ENCOUNTER — PATIENT MESSAGE (OUTPATIENT)
Dept: NEUROLOGY | Facility: CLINIC | Age: 35
End: 2020-05-06

## 2020-05-06 ENCOUNTER — HOSPITAL ENCOUNTER (OUTPATIENT)
Dept: RADIOLOGY | Facility: HOSPITAL | Age: 35
Discharge: HOME | End: 2020-05-06
Attending: CLINICAL NURSE SPECIALIST
Payer: COMMERCIAL

## 2020-05-06 ENCOUNTER — TELEPHONE (OUTPATIENT)
Dept: NEUROLOGY | Facility: CLINIC | Age: 35
End: 2020-05-06

## 2020-05-06 DIAGNOSIS — G35 MULTIPLE SCLEROSIS: Primary | ICD-10-CM

## 2020-05-06 DIAGNOSIS — G35 MULTIPLE SCLEROSIS: ICD-10-CM

## 2020-05-06 PROCEDURE — 70553 MRI BRAIN DEMYELINATING W/ WO CONTRAST: ICD-10-PCS | Mod: 26,,, | Performed by: RADIOLOGY

## 2020-05-06 PROCEDURE — A9585 GADOBUTROL INJECTION: HCPCS | Performed by: CLINICAL NURSE SPECIALIST

## 2020-05-06 PROCEDURE — 25500020 PHARM REV CODE 255: Performed by: CLINICAL NURSE SPECIALIST

## 2020-05-06 PROCEDURE — 70553 MRI BRAIN STEM W/O & W/DYE: CPT | Mod: 26,,, | Performed by: RADIOLOGY

## 2020-05-06 PROCEDURE — 70553 MRI BRAIN STEM W/O & W/DYE: CPT | Mod: TC

## 2020-05-06 RX ORDER — DEXAMETHASONE 6 MG/1
TABLET ORAL
Qty: 99 TABLET | Refills: 0 | Status: SHIPPED | OUTPATIENT
Start: 2020-05-06 | End: 2020-05-11 | Stop reason: SDUPTHER

## 2020-05-06 RX ORDER — GADOBUTROL 604.72 MG/ML
10 INJECTION INTRAVENOUS
Status: COMPLETED | OUTPATIENT
Start: 2020-05-06 | End: 2020-05-06

## 2020-05-06 RX ADMIN — GADOBUTROL 10 ML: 604.72 INJECTION INTRAVENOUS at 03:05

## 2020-05-06 NOTE — TELEPHONE ENCOUNTER
Patient has active lesions on brain MRI; she was seen earlier this week as fit-in with reports of symptoms. Rx for Decadron 198mg daily sent to pharmacy. Patient is aware. I will see her next week in clinic to discuss medication options.

## 2020-05-09 ENCOUNTER — PATIENT MESSAGE (OUTPATIENT)
Dept: NEUROLOGY | Facility: CLINIC | Age: 35
End: 2020-05-09

## 2020-05-11 ENCOUNTER — TELEPHONE (OUTPATIENT)
Dept: PHARMACY | Facility: CLINIC | Age: 35
End: 2020-05-11

## 2020-05-11 ENCOUNTER — PATIENT MESSAGE (OUTPATIENT)
Dept: NEUROLOGY | Facility: CLINIC | Age: 35
End: 2020-05-11

## 2020-05-11 DIAGNOSIS — G35 MULTIPLE SCLEROSIS: ICD-10-CM

## 2020-05-11 RX ORDER — DEXAMETHASONE 6 MG/1
TABLET ORAL
Qty: 66 TABLET | Refills: 0 | Status: SHIPPED | OUTPATIENT
Start: 2020-05-11 | End: 2020-08-19

## 2020-05-11 NOTE — TELEPHONE ENCOUNTER
Patient states that she has new lesions and has an appt with neurologist on Wednesday. Plans on switching medications, but will continue on Glatopa until she starts new meds. Asked for [facility] to call on Wednesday/Thursday to follow up. Opening intervention for pharmacist to assess therapy.

## 2020-05-13 ENCOUNTER — OFFICE VISIT (OUTPATIENT)
Dept: NEUROLOGY | Facility: CLINIC | Age: 35
End: 2020-05-13
Payer: COMMERCIAL

## 2020-05-13 VITALS
BODY MASS INDEX: 54.87 KG/M2 | HEIGHT: 59 IN | HEART RATE: 90 BPM | DIASTOLIC BLOOD PRESSURE: 97 MMHG | WEIGHT: 272.19 LBS | SYSTOLIC BLOOD PRESSURE: 148 MMHG

## 2020-05-13 DIAGNOSIS — G35 MULTIPLE SCLEROSIS: Primary | ICD-10-CM

## 2020-05-13 DIAGNOSIS — Z29.89 PROPHYLACTIC IMMUNOTHERAPY: ICD-10-CM

## 2020-05-13 DIAGNOSIS — Z71.89 COUNSELING REGARDING GOALS OF CARE: ICD-10-CM

## 2020-05-13 DIAGNOSIS — Z79.899 HIGH RISK MEDICATION USE: ICD-10-CM

## 2020-05-13 PROCEDURE — 3080F DIAST BP >= 90 MM HG: CPT | Mod: CPTII,S$GLB,, | Performed by: CLINICAL NURSE SPECIALIST

## 2020-05-13 PROCEDURE — 99215 PR OFFICE/OUTPT VISIT, EST, LEVL V, 40-54 MIN: ICD-10-PCS | Mod: S$GLB,,, | Performed by: CLINICAL NURSE SPECIALIST

## 2020-05-13 PROCEDURE — 99999 PR PBB SHADOW E&M-EST. PATIENT-LVL III: CPT | Mod: PBBFAC,,, | Performed by: CLINICAL NURSE SPECIALIST

## 2020-05-13 PROCEDURE — 99215 OFFICE O/P EST HI 40 MIN: CPT | Mod: S$GLB,,, | Performed by: CLINICAL NURSE SPECIALIST

## 2020-05-13 PROCEDURE — 99999 PR PBB SHADOW E&M-EST. PATIENT-LVL III: ICD-10-PCS | Mod: PBBFAC,,, | Performed by: CLINICAL NURSE SPECIALIST

## 2020-05-13 PROCEDURE — 3077F SYST BP >= 140 MM HG: CPT | Mod: CPTII,S$GLB,, | Performed by: CLINICAL NURSE SPECIALIST

## 2020-05-13 PROCEDURE — 3008F PR BODY MASS INDEX (BMI) DOCUMENTED: ICD-10-PCS | Mod: CPTII,S$GLB,, | Performed by: CLINICAL NURSE SPECIALIST

## 2020-05-13 PROCEDURE — 3077F PR MOST RECENT SYSTOLIC BLOOD PRESSURE >= 140 MM HG: ICD-10-PCS | Mod: CPTII,S$GLB,, | Performed by: CLINICAL NURSE SPECIALIST

## 2020-05-13 PROCEDURE — 3080F PR MOST RECENT DIASTOLIC BLOOD PRESSURE >= 90 MM HG: ICD-10-PCS | Mod: CPTII,S$GLB,, | Performed by: CLINICAL NURSE SPECIALIST

## 2020-05-13 PROCEDURE — 3008F BODY MASS INDEX DOCD: CPT | Mod: CPTII,S$GLB,, | Performed by: CLINICAL NURSE SPECIALIST

## 2020-05-13 RX ORDER — ASCORBIC ACID 125 MG
5 TABLET,CHEWABLE ORAL NIGHTLY
COMMUNITY
End: 2021-03-30

## 2020-05-13 NOTE — PROGRESS NOTES
Subjective:          Patient ID: Sandra France is a 34 y.o. female who presents today for a routine clinic visit for MS.  She was last seen for a virtual visit on 5/4/20. The history has been provided by the patient.     MS HPI:  · DMT: glatiramer acetate  · Taking vitamin D3 as recommended? Yes -  Dose: 5000 units daily   · On 5/4, Sandra reported intense brain fog and trouble focusing, as well as dizziness/lightheadedness, and feeling off balance. She also had some blurry vision, mostly on the right.   · There was no sign of infection. MRIs were done, which showed new, active lesions. She was treated with 5 days of Decadron. She continues to have some blurred vision. Her brain fog is better, but significantly worse than her baseline. Dizziness and lightheadedness are still consistent, but has improved.   · She thinks that gabapentin is helping a bit at night for tingling in her hands and feet. However, she has not been sleeping through the night.     Medications:  Current Outpatient Medications   Medication Sig    buPROPion (WELLBUTRIN XL) 300 MG 24 hr tablet Take 1 tablet (300 mg total) by mouth once daily.    coenzyme Q10 10 mg capsule Take 500 mg by mouth once daily.    drospirenone, contraceptive, (SLYND) 4 mg (28) Tab Take 4 mg by mouth once daily.    ergocalciferol, vitamin D2, (VITAMIN D ORAL) Take 5,000 Units by mouth once daily.     escitalopram oxalate (LEXAPRO) 10 MG tablet Take 10 mg by mouth once daily.    FERROUS SULFATE ORAL Take 1 tablet by mouth once daily.     fesoterodine (TOVIAZ) 4 mg Tb24 Take 1 tablet (4 mg total) by mouth once daily.    gabapentin (NEURONTIN) 100 MG capsule Take 1 capsule (100 mg total) by mouth 3 (three) times daily.    glatiramer (COPAXONE, GLATOPA) 40 mg/mL injection Inject 40 mg into the skin 3 (three) times a week.    ibuprofen (ADVIL,MOTRIN) 200 MG tablet Take 800 mg by mouth daily as needed for Pain.    levothyroxine (SYNTHROID) 75 MCG tablet Take 75  mcg by mouth before breakfast.     liothyronine (CYTOMEL) 5 MCG Tab Take 10 mcg by mouth once daily.     lisinopril 10 MG tablet Take 10 mg by mouth once daily.    magnesium glycinate 100 mg Tab Take 400 mg by mouth once.    melatonin 5 mg Chew Take 5 mg by mouth nightly.    omeprazole/sodium bicarbonate (ZEGERID OTC ORAL) Take 1 tablet by mouth 2 (two) times daily as needed.     topiramate (TOPAMAX) 100 MG tablet Take 1 tablet (100 mg total) by mouth 2 (two) times daily.    dexAMETHasone (DECADRON) 6 MG tablet Take 33 tabs daily for 2 days (66 tablets) (Patient not taking: Reported on 5/13/2020)     No current facility-administered medications for this visit.        SOCIAL HISTORY  Social History     Tobacco Use    Smoking status: Current Every Day Smoker     Packs/day: 0.25     Years: 12.00     Pack years: 3.00     Types: Cigarettes    Smokeless tobacco: Never Used   Substance Use Topics    Alcohol use: No    Drug use: Never       Living arrangements - the patient lives with her family.    ROS:    REVIEW OF SYMPTOMS 5/11/2020   Do you feel abnormally tired on most days? Yes--did not get a boost with steroids    Do you feel you generally sleep well? No--not sleeping through the night    Do you have difficulty controlling your bladder?  Yes--has some urgency and leakage; she has done pelvic floor therapy pre-COVID and hopes to return to this   Do you have difficulty controlling your bowels?  No   Do you have frequent muscle cramps, tightness or spasms in your limbs?  No--gabapentin helping    Do you have new visual symptoms?  Yes--right eye blurry vision at times    Do you have worsening difficulty with your memory or thinking? Yes--brain fog continues    Do you have worsening symptoms of anxiety or depression?  Yes--Mood is up and down   For patients who walk, Do you have more difficulty walking?  No--feels unsteady sometimes; sometimes has to walk with the walls    Have you fallen since your last visit?   Fell backwards when squatting over recently    For patients who use wheelchairs: Do you have any skin wounds or breakdown? Not Applicable   Do you have difficulty using your hands?  No--occasional numbness and tingling in hands and feet, but not worse    Do you have shooting or burning pain? No   Do you have difficulty with sexual function?  No   If you are sexually active, are you using birth control? Y/N  N/A Yes   Do you often choke when swallowing liquids or solid food?  No   Do you experience worsening symptoms when overheated? Yes--feels worse physically and mood gets worse when overheated    Do you need any new equipment such as a wheelchair, walker or shower chair? No   Do you receive co-pay financial assistance for your principal MS medicine? Yes   Would you be interested in participating in an MS research trial in the future? Yes   For patients on Gilenya, Tecfidera, Aubagio, Rituxan, Ocrevus, Tysabri, Lemtrada or Methotrexate, are you aware that you should NOT receive live virus vaccines?  Not Applicable   Do you feel you have adequate family/friend support?  Yes   Do you have health insurance?   Yes   Are you currently employed? Yes   Do you receive SSDI/SSI?  Not Applicable   Do you use marijuana or cannabis products? No   Have you been diagnosed with a urinary tract infection since your last visit here? No   Have you been diagnosed with a respiratory tract infection since your last visit here? No   Have you been to the emergency room since your last visit here? No   Have you been hospitalized since your last visit here?  No                Objective:        1. 25 foot timed walk: 5.1 seconds today without assist   Timed 25 Foot Walk: 2/5/2020   Did patient wear an AFO? No   Was assistive device used? No   Time for 25 Foot Walk (seconds) 4.57       Neurologic Exam     Mental Status   Oriented to person, place, and time.   Attention: normal. Concentration: normal.   Speech: speech is normal   Level of  consciousness: alert  Knowledge: good.   Normal comprehension.     Cranial Nerves     CN II   Visual acuity: (20/20 OS, 20/25 OD )    CN III, IV, VI   Pupils are equal, round, and reactive to light.  Extraocular motions are normal.   Right pupil: Shape: regular. Reactivity: brisk.   Left pupil: Shape: regular. Reactivity: brisk.   CN III: no CN III palsy  CN VI: no CN VI palsy  Nystagmus: none     CN V   Right facial sensation deficit: none  Left facial sensation deficit: none    CN VII   Right facial weakness: none  Left facial weakness: none    CN VIII   Hearing: intact    CN IX, X   Palate: symmetric    CN XI   Right sternocleidomastoid strength: normal  Left sternocleidomastoid strength: normal  Right trapezius strength: normal  Left trapezius strength: normal    CN XII   Tongue deviation: none    Right APD     Motor Exam   Muscle bulk: normal  Overall muscle tone: normal    Strength   Strength 5/5 throughout.   Right neck flexion: 5/5  Left neck flexion: 5/5  Right neck extension: 5/5  Left neck extension: 5/5  Right deltoid: 5/5  Left deltoid: 5/5  Right biceps: 5/5  Left biceps: 5/5  Right triceps: 5/5  Left triceps: 5/5  Right wrist flexion: 5/5  Left wrist flexion: 5/5  Right wrist extension: 5/5  Left wrist extension: 5/5  Right interossei: 5/5  Left interossei: 5/5  Right iliopsoas: 5/5  Left iliopsoas: 5/5  Right quadriceps: 5/5  Left quadriceps: 5/5  Right hamstrin/5  Left hamstrin/5  Right anterior tibial: 5/5  Left anterior tibial: 5/5  Right gastroc: 5/5  Left gastroc: 5/5    Sensory Exam   Right arm vibration: normal  Left arm vibration: normal  Right leg vibration: decreased from knee  Left leg vibration: decreased from knee    Mildly diminished vibratory sense in bilateral lower extremities (right worse than left)     Gait, Coordination, and Reflexes     Gait  Gait: normal    Coordination   Romberg: negative  Finger to nose coordination: normal  Heel to shin coordination: normal  Tandem  walking coordination: normal    Tremor   Resting tremor: absent    Reflexes   Right brachioradialis: 2+  Left brachioradialis: 2+  Right biceps: 2+  Left biceps: 2+  Right triceps: 2+  Left triceps: 2+  Right patellar: 2+  Left patellar: 2+  Right achilles: 2+  Left achilles: 2+  Right plantar: equivocal  Left plantar: equivocal  She can walk on toes and heels.   Normal rapid sequential movements in upper and lower extremities         Imaging:       Results for orders placed during the hospital encounter of 05/06/20   MRI Brain Demyelinating W W/O Contrast    Impression Interval development of scattered small to punctate size new T2 FLAIR hyperintense lesions with corresponding enhancement throughout the brain parenchyma as detailed above.  In light of history and prior findings most compatible with active areas of demyelination with largest focus of enhancement measuring 9 mm in the right posterior temporal deep white matter.  Clinical correlation and follow-up recommended.      Electronically signed by: Edward Elena DO  Date:    05/06/2020  Time:    16:33     Images and results reviewed with the patient.     Labs:     Lab Results   Component Value Date    [id number] 60 09/30/2019    [id number] 39 08/03/2018     Lab Results   Component Value Date    WBC 13.15 (H) 07/24/2019    RBC 4.60 07/24/2019    HGB 11.8 (L) 07/24/2019    HCT 37.1 07/24/2019    MCV 81 (L) 07/24/2019    MCH 25.7 (L) 07/24/2019    MCHC 31.8 (L) 07/24/2019    RDW 14.9 (H) 07/24/2019     (H) 07/24/2019    MPV 9.1 (L) 07/24/2019    GRAN 9.2 (H) 07/24/2019    GRAN 70.1 07/24/2019    LYMPH 2.8 07/24/2019    LYMPH 21.0 07/24/2019    MONO 0.9 07/24/2019    MONO 6.5 07/24/2019    EOS 0.2 07/24/2019    BASO 0.07 07/24/2019    EOSINOPHIL 1.4 07/24/2019    BASOPHIL 0.5 07/24/2019     Sodium   Date Value Ref Range Status   08/03/2018 139 136 - 145 mmol/L Final     Potassium   Date Value Ref Range Status   08/03/2018 4.3 3.5 - 5.1 mmol/L Final      Chloride   Date Value Ref Range Status   08/03/2018 106 95 - 110 mmol/L Final     CO2   Date Value Ref Range Status   08/03/2018 23 23 - 29 mmol/L Final     Glucose   Date Value Ref Range Status   08/03/2018 86 70 - 110 mg/dL Final     BUN, Bld   Date Value Ref Range Status   08/03/2018 13 6 - 20 mg/dL Final     Creatinine   Date Value Ref Range Status   08/03/2018 0.7 0.5 - 1.4 mg/dL Final     Calcium   Date Value Ref Range Status   08/03/2018 9.3 8.7 - 10.5 mg/dL Final     Total Protein   Date Value Ref Range Status   08/03/2018 7.3 6.0 - 8.4 g/dL Final     Albumin   Date Value Ref Range Status   08/03/2018 3.4 (L) 3.5 - 5.2 g/dL Final     Total Bilirubin   Date Value Ref Range Status   08/03/2018 0.3 0.1 - 1.0 mg/dL Final     Comment:     For infants and newborns, interpretation of results should be based  on gestational age, weight and in agreement with clinical  observations.  Premature Infant recommended reference ranges:  Up to 24 hours.............<8.0 mg/dL  Up to 48 hours............<12.0 mg/dL  3-5 days..................<15.0 mg/dL  6-29 days.................<15.0 mg/dL       Alkaline Phosphatase   Date Value Ref Range Status   08/03/2018 88 55 - 135 U/L Final     AST   Date Value Ref Range Status   08/03/2018 15 10 - 40 U/L Final     ALT   Date Value Ref Range Status   08/03/2018 16 10 - 44 U/L Final     Anion Gap   Date Value Ref Range Status   08/03/2018 10 8 - 16 mmol/L Final     eGFR if    Date Value Ref Range Status   08/03/2018 >60.0 >60 mL/min/1.73 m^2 Final     eGFR if non    Date Value Ref Range Status   08/03/2018 >60.0 >60 mL/min/1.73 m^2 Final     Comment:     Calculation used to obtain the estimated glomerular filtration  rate (eGFR) is the CKD-EPI equation.        Lab Results   Component Value Date    HEPBSAG Negative 08/03/2018         MS Impression and Plan:     NEURO MULTIPLE SCLEROSIS IMPRESSION:   MS Status:     Number of relapses in the past year?:   0    Clinical Progression:  Worsened    MRI Progression:  Worsened (new lesions)  Plan:     DMT:  Switch Disease Modifying therapy    DMT comment:  Sandra has had a relapse with worsening brain fog, blurred vision, numbness and tingling in hands and feet, dizziness, and impaired balance. MRI of the brain showed several new, active lesions. She has been on glatiramer for nearly two years, and it is obvious that this medication is not effective for her, as she has been adherent. Given the size of some of the new lesions in her brain, I recommend switching DMT to Ocrevus. The patient was counseled about the risks associated with immune suppressive therapy, including a higher risk of serious infections (URI, UTI, PML) and malignancy (breast), as well as the importance of avoiding all live virus vaccines while on immune suppressive medication. We discussed side effects of infusion reactions (itching, rash, swelling or itchiness in the throat) and administration of pre-meds to lessen this response. We also discussed other possible risks of side effects of an allergic reaction, constipation/diarrhea, nausea/vomiting, local damage at the IV site, sores on the feet, blood in the urine, and peripheral edema.  The patient verbalized understanding of risk and signed consent form. Pre-immunosuppression labs ordered and referral placed to ID for vaccine consult in light of immunosuppression, Once lab results are received and confirmed as negative, we can schedule infusion. She understands that she will need to do lab work for CBC and Rituxan sensitivity at month 5 before next infusion.   Until Ocrevus is approved, she will continue glatiramer.   She will also continue Vitamin D.     Switch Disease Modifying Therapy FROM:  Glatiramer acetate    TO:  Ocrelizumab    Symptom Management:  No change in symptom management       We will defer additional steroids for now, and she will let me know if she does not feel a continued improvement  in the next few weeks.     She will have new MRIs at 6 months after starting Ocrevus.       Our visit today lasted 55 minutes, and 100% of this time was spent face to face with the patient. Over 50% of this visit included discussion of the treatment plan/medication changes/symptom management/exam findings/imaging results/coordination of care. The patient agrees with the plan of care. She will follow up with Dr. Campos in August 2020.      NICHELLE Navarro, CNS     Problem List Items Addressed This Visit        Neurologic Problems    Multiple sclerosis    Relevant Orders    Ambulatory referral/consult to Infectious Disease    CBC auto differential (Completed)    Comprehensive metabolic panel (Completed)    Opdyke-Suppressor Ratio    Hepatitis A antibody, IgG    Hepatitis B Core Antibody, Total    Hepatitis B Surface Ab, Qualitative    Hepatitis B Surface Antigen    Hepatitis C Antibody    HIV 1/2 Ag/Ab (4th Gen)    Immunoglobulins (IgG, IgA, IgM) Quantitative    Quantiferon Gold TB    RPR    STRATIFY JCV ANTIBODY (with Index)    Strongyloides IgG Antibodies    Varicella Zoster Antibody, IgG    Vitamin D

## 2020-05-13 NOTE — Clinical Note
Switching Sandra to Ocrevus. She has labs scheduled for today at [facility]. ID is scheduled for 6/2. BB, do you suggest that we proceed with infusion once approved, regardless of ID, since she is so active? I have her paperwork. Just let me know who wants it :)

## 2020-05-14 ENCOUNTER — PATIENT MESSAGE (OUTPATIENT)
Dept: NEUROLOGY | Facility: CLINIC | Age: 35
End: 2020-05-14

## 2020-05-14 ENCOUNTER — LAB VISIT (OUTPATIENT)
Dept: LAB | Facility: HOSPITAL | Age: 35
End: 2020-05-14
Attending: CLINICAL NURSE SPECIALIST
Payer: COMMERCIAL

## 2020-05-14 DIAGNOSIS — G35 MULTIPLE SCLEROSIS: ICD-10-CM

## 2020-05-14 LAB
25(OH)D3+25(OH)D2 SERPL-MCNC: 34 NG/ML (ref 30–96)
ALBUMIN SERPL BCP-MCNC: 3.3 G/DL (ref 3.5–5.2)
ALP SERPL-CCNC: 56 U/L (ref 55–135)
ALT SERPL W/O P-5'-P-CCNC: 20 U/L (ref 10–44)
ANION GAP SERPL CALC-SCNC: 9 MMOL/L (ref 8–16)
ANISOCYTOSIS BLD QL SMEAR: SLIGHT
AST SERPL-CCNC: 7 U/L (ref 10–40)
BASOPHILS # BLD AUTO: ABNORMAL K/UL (ref 0–0.2)
BASOPHILS NFR BLD: 0 % (ref 0–1.9)
BILIRUB SERPL-MCNC: 0.8 MG/DL (ref 0.1–1)
BUN SERPL-MCNC: 24 MG/DL (ref 6–20)
CALCIUM SERPL-MCNC: 8.4 MG/DL (ref 8.7–10.5)
CHLORIDE SERPL-SCNC: 104 MMOL/L (ref 95–110)
CO2 SERPL-SCNC: 24 MMOL/L (ref 23–29)
CREAT SERPL-MCNC: 0.7 MG/DL (ref 0.5–1.4)
DIFFERENTIAL METHOD: ABNORMAL
EOSINOPHIL # BLD AUTO: ABNORMAL K/UL (ref 0–0.5)
EOSINOPHIL NFR BLD: 1 % (ref 0–8)
ERYTHROCYTE [DISTWIDTH] IN BLOOD BY AUTOMATED COUNT: 14.2 % (ref 11.5–14.5)
EST. GFR  (AFRICAN AMERICAN): >60 ML/MIN/1.73 M^2
EST. GFR  (NON AFRICAN AMERICAN): >60 ML/MIN/1.73 M^2
GLUCOSE SERPL-MCNC: 96 MG/DL (ref 70–110)
HCT VFR BLD AUTO: 42.6 % (ref 37–48.5)
HGB BLD-MCNC: 13.7 G/DL (ref 12–16)
IGA SERPL-MCNC: 221 MG/DL (ref 40–350)
IGG SERPL-MCNC: 871 MG/DL (ref 650–1600)
IGM SERPL-MCNC: 52 MG/DL (ref 50–300)
IMM GRANULOCYTES # BLD AUTO: ABNORMAL K/UL (ref 0–0.04)
IMM GRANULOCYTES NFR BLD AUTO: ABNORMAL % (ref 0–0.5)
LYMPHOCYTES # BLD AUTO: ABNORMAL K/UL (ref 1–4.8)
LYMPHOCYTES NFR BLD: 31 % (ref 18–48)
MCH RBC QN AUTO: 27.8 PG (ref 27–31)
MCHC RBC AUTO-ENTMCNC: 32.2 G/DL (ref 32–36)
MCV RBC AUTO: 86 FL (ref 82–98)
MONOCYTES # BLD AUTO: ABNORMAL K/UL (ref 0.3–1)
MONOCYTES NFR BLD: 7 % (ref 4–15)
NEUTROPHILS NFR BLD: 61 % (ref 38–73)
NRBC BLD-RTO: 0 /100 WBC
PLATELET # BLD AUTO: 417 K/UL (ref 150–350)
PLATELET BLD QL SMEAR: ABNORMAL
PMV BLD AUTO: 9.2 FL (ref 9.2–12.9)
POTASSIUM SERPL-SCNC: 3.7 MMOL/L (ref 3.5–5.1)
PROT SERPL-MCNC: 6.2 G/DL (ref 6–8.4)
RBC # BLD AUTO: 4.93 M/UL (ref 4–5.4)
SODIUM SERPL-SCNC: 137 MMOL/L (ref 136–145)
SPHEROCYTES BLD QL SMEAR: ABNORMAL
WBC # BLD AUTO: 21.25 K/UL (ref 3.9–12.7)

## 2020-05-14 PROCEDURE — 86703 HIV-1/HIV-2 1 RESULT ANTBDY: CPT

## 2020-05-14 PROCEDURE — 86787 VARICELLA-ZOSTER ANTIBODY: CPT

## 2020-05-14 PROCEDURE — 86706 HEP B SURFACE ANTIBODY: CPT

## 2020-05-14 PROCEDURE — 86360 T CELL ABSOLUTE COUNT/RATIO: CPT

## 2020-05-14 PROCEDURE — 82784 ASSAY IGA/IGD/IGG/IGM EACH: CPT | Mod: 59

## 2020-05-14 PROCEDURE — 87340 HEPATITIS B SURFACE AG IA: CPT

## 2020-05-14 PROCEDURE — 86592 SYPHILIS TEST NON-TREP QUAL: CPT

## 2020-05-14 PROCEDURE — 86682 HELMINTH ANTIBODY: CPT

## 2020-05-14 PROCEDURE — 86704 HEP B CORE ANTIBODY TOTAL: CPT

## 2020-05-14 PROCEDURE — 82306 VITAMIN D 25 HYDROXY: CPT

## 2020-05-14 PROCEDURE — 80053 COMPREHEN METABOLIC PANEL: CPT

## 2020-05-14 PROCEDURE — 85027 COMPLETE CBC AUTOMATED: CPT

## 2020-05-14 PROCEDURE — 86790 VIRUS ANTIBODY NOS: CPT

## 2020-05-14 PROCEDURE — 86480 TB TEST CELL IMMUN MEASURE: CPT

## 2020-05-14 PROCEDURE — 85007 BL SMEAR W/DIFF WBC COUNT: CPT

## 2020-05-14 PROCEDURE — 36415 COLL VENOUS BLD VENIPUNCTURE: CPT

## 2020-05-14 PROCEDURE — 86359 T CELLS TOTAL COUNT: CPT

## 2020-05-14 PROCEDURE — 86803 HEPATITIS C AB TEST: CPT

## 2020-05-15 LAB
ABSOLUTE CD3: 5009 CELLS/UL (ref 700–2100)
ABSOLUTE CD8: 1351 CELLS/UL (ref 200–900)
CD3%: 81 % (ref 55–83)
CD3+CD4+ CELLS # BLD: 3629 CELLS/UL (ref 300–1400)
CD3+CD4+ CELLS NFR BLD: 58.7 % (ref 28–57)
CD4/CD8 RATIO: 2.69 (ref 0.9–3.6)
CD8 % SUPPRESSOR T CELL: 21.9 % (ref 10–39)
HBV CORE AB SERPL QL IA: NEGATIVE
HBV SURFACE AB SER-ACNC: NEGATIVE M[IU]/ML
HBV SURFACE AG SERPL QL IA: NEGATIVE
HCV AB SERPL QL IA: NEGATIVE
HEPATITIS A ANTIBODY, IGG: NEGATIVE
HIV 1+2 AB+HIV1 P24 AG SERPL QL IA: NEGATIVE
RPR SER QL: NORMAL

## 2020-05-18 ENCOUNTER — TELEPHONE (OUTPATIENT)
Dept: NEUROLOGY | Facility: CLINIC | Age: 35
End: 2020-05-18

## 2020-05-18 DIAGNOSIS — G35 MULTIPLE SCLEROSIS: Primary | ICD-10-CM

## 2020-05-18 DIAGNOSIS — Z13.9 SCREENING FOR CONDITION: ICD-10-CM

## 2020-05-18 LAB
GAMMA INTERFERON BACKGROUND BLD IA-ACNC: 0.01 IU/ML
M TB IFN-G CD4+ BCKGRND COR BLD-ACNC: 0 IU/ML
MITOGEN IGNF BCKGRD COR BLD-ACNC: >10 IU/ML
STRONGYLOIDES ANTIBODY IGG: NEGATIVE
TB GOLD PLUS: NEGATIVE
TB2 - NIL: 0 IU/ML
VARICELLA INTERPRETATION: POSITIVE
VARICELLA ZOSTER IGG: 3.01 ISR (ref 0–0.9)

## 2020-05-18 NOTE — TELEPHONE ENCOUNTER
----- Message from NICHELLE Jimenez, CNS sent at 5/14/2020 11:04 AM CDT -----  Switching Sandra to Ocrevus. She has labs scheduled for today at [facility]. ID is scheduled for 6/2.    Yes

## 2020-05-19 NOTE — TELEPHONE ENCOUNTER
Reviewed labs:  VZV positive (past infection or vaccination)  Strongyloides Ab negative  RPR negative  TB negative  Normal immunoglobulins  HIV negative  Hep C Ab negative  Hep B Surface Ag negative  Hep B Surface Ab negative  Hep B Core Ab negative  Hep A IgG negative   Gw4=7102  WBC=21.25 (elevated from steroids)  ALC=result canceled    Will repeat CBC just prior to infusion

## 2020-05-21 NOTE — TELEPHONE ENCOUNTER
Pt is scheduled for repeat CBC on 5/27/20 and initial Ocrevus infusions on 5/29 and 6/12. Pt aware to stop her Copaxone 1-2 days prior to her infusion, and to stop by the [facility] in [place] on 5/27 for COVID testing.

## 2020-05-22 LAB
JCPYV AB SERPL QL IA: NEGATIVE
JCV INDEX: 0.12

## 2020-05-22 RX ORDER — EPINEPHRINE 0.3 MG/.3ML
0.3 INJECTION SUBCUTANEOUS
Status: CANCELLED | OUTPATIENT
Start: 2020-05-22

## 2020-05-22 RX ORDER — HEPARIN 100 UNIT/ML
500 SYRINGE INTRAVENOUS
Status: CANCELLED | OUTPATIENT
Start: 2020-05-22

## 2020-05-22 RX ORDER — SODIUM CHLORIDE 0.9 % (FLUSH) 0.9 %
10 SYRINGE (ML) INJECTION
Status: CANCELLED | OUTPATIENT
Start: 2020-05-22

## 2020-05-22 RX ORDER — DIPHENHYDRAMINE HYDROCHLORIDE 50 MG/ML
50 INJECTION INTRAMUSCULAR; INTRAVENOUS
Status: CANCELLED | OUTPATIENT
Start: 2020-05-22

## 2020-05-22 RX ORDER — FAMOTIDINE 10 MG/ML
20 INJECTION INTRAVENOUS
Status: CANCELLED | OUTPATIENT
Start: 2020-05-22

## 2020-05-22 RX ORDER — ACETAMINOPHEN 325 MG/1
1000 TABLET ORAL
Status: CANCELLED | OUTPATIENT
Start: 2020-05-22

## 2020-05-27 ENCOUNTER — LAB VISIT (OUTPATIENT)
Dept: LAB | Facility: HOSPITAL | Age: 35
End: 2020-05-27
Attending: CLINICAL NURSE SPECIALIST
Payer: COMMERCIAL

## 2020-05-27 ENCOUNTER — CLINICAL SUPPORT (OUTPATIENT)
Dept: URGENT CARE | Facility: CLINIC | Age: 35
End: 2020-05-27
Payer: COMMERCIAL

## 2020-05-27 VITALS — TEMPERATURE: 98 F

## 2020-05-27 DIAGNOSIS — G35 MULTIPLE SCLEROSIS: ICD-10-CM

## 2020-05-27 DIAGNOSIS — Z13.9 SCREENING FOR CONDITION: ICD-10-CM

## 2020-05-27 LAB
BASOPHILS # BLD AUTO: 0.07 K/UL (ref 0–0.2)
BASOPHILS NFR BLD: 0.5 % (ref 0–1.9)
DIFFERENTIAL METHOD: ABNORMAL
EOSINOPHIL # BLD AUTO: 0.3 K/UL (ref 0–0.5)
EOSINOPHIL NFR BLD: 1.9 % (ref 0–8)
ERYTHROCYTE [DISTWIDTH] IN BLOOD BY AUTOMATED COUNT: 15.3 % (ref 11.5–14.5)
HCT VFR BLD AUTO: 38.4 % (ref 37–48.5)
HGB BLD-MCNC: 12.1 G/DL (ref 12–16)
IMM GRANULOCYTES # BLD AUTO: 0.07 K/UL (ref 0–0.04)
IMM GRANULOCYTES NFR BLD AUTO: 0.5 % (ref 0–0.5)
LYMPHOCYTES # BLD AUTO: 3.1 K/UL (ref 1–4.8)
LYMPHOCYTES NFR BLD: 22.9 % (ref 18–48)
MCH RBC QN AUTO: 28.1 PG (ref 27–31)
MCHC RBC AUTO-ENTMCNC: 31.5 G/DL (ref 32–36)
MCV RBC AUTO: 89 FL (ref 82–98)
MONOCYTES # BLD AUTO: 0.7 K/UL (ref 0.3–1)
MONOCYTES NFR BLD: 5.1 % (ref 4–15)
NEUTROPHILS # BLD AUTO: 9.2 K/UL (ref 1.8–7.7)
NEUTROPHILS NFR BLD: 69.1 % (ref 38–73)
NRBC BLD-RTO: 0 /100 WBC
PLATELET # BLD AUTO: 323 K/UL (ref 150–350)
PMV BLD AUTO: 9.1 FL (ref 9.2–12.9)
RBC # BLD AUTO: 4.3 M/UL (ref 4–5.4)
WBC # BLD AUTO: 13.36 K/UL (ref 3.9–12.7)

## 2020-05-27 PROCEDURE — 36415 COLL VENOUS BLD VENIPUNCTURE: CPT

## 2020-05-27 PROCEDURE — 85025 COMPLETE CBC W/AUTO DIFF WBC: CPT

## 2020-05-27 PROCEDURE — U0003 INFECTIOUS AGENT DETECTION BY NUCLEIC ACID (DNA OR RNA); SEVERE ACUTE RESPIRATORY SYNDROME CORONAVIRUS 2 (SARS-COV-2) (CORONAVIRUS DISEASE [COVID-19]), AMPLIFIED PROBE TECHNIQUE, MAKING USE OF HIGH THROUGHPUT TECHNOLOGIES AS DESCRIBED BY CMS-2020-01-R: HCPCS

## 2020-05-28 LAB — SARS-COV-2 RNA RESP QL NAA+PROBE: NOT DETECTED

## 2020-05-29 ENCOUNTER — INFUSION (OUTPATIENT)
Dept: INFUSION THERAPY | Facility: HOSPITAL | Age: 35
End: 2020-05-29
Attending: PSYCHIATRY & NEUROLOGY
Payer: COMMERCIAL

## 2020-05-29 VITALS
HEART RATE: 103 BPM | SYSTOLIC BLOOD PRESSURE: 124 MMHG | TEMPERATURE: 99 F | RESPIRATION RATE: 18 BRPM | DIASTOLIC BLOOD PRESSURE: 57 MMHG

## 2020-05-29 DIAGNOSIS — G35 MULTIPLE SCLEROSIS: Primary | ICD-10-CM

## 2020-05-29 PROCEDURE — 96367 TX/PROPH/DG ADDL SEQ IV INF: CPT

## 2020-05-29 PROCEDURE — 25000003 PHARM REV CODE 250: Performed by: PSYCHIATRY & NEUROLOGY

## 2020-05-29 PROCEDURE — 63600175 PHARM REV CODE 636 W HCPCS: Mod: TB | Performed by: PSYCHIATRY & NEUROLOGY

## 2020-05-29 PROCEDURE — 96365 THER/PROPH/DIAG IV INF INIT: CPT

## 2020-05-29 PROCEDURE — S0028 INJECTION, FAMOTIDINE, 20 MG: HCPCS | Performed by: PSYCHIATRY & NEUROLOGY

## 2020-05-29 PROCEDURE — 96366 THER/PROPH/DIAG IV INF ADDON: CPT

## 2020-05-29 PROCEDURE — 96375 TX/PRO/DX INJ NEW DRUG ADDON: CPT

## 2020-05-29 RX ORDER — ACETAMINOPHEN 500 MG
1000 TABLET ORAL
Status: CANCELLED | OUTPATIENT
Start: 2020-06-12

## 2020-05-29 RX ORDER — FAMOTIDINE 10 MG/ML
20 INJECTION INTRAVENOUS
Status: COMPLETED | OUTPATIENT
Start: 2020-05-29 | End: 2020-05-29

## 2020-05-29 RX ORDER — SODIUM CHLORIDE 0.9 % (FLUSH) 0.9 %
10 SYRINGE (ML) INJECTION
Status: CANCELLED | OUTPATIENT
Start: 2020-06-12

## 2020-05-29 RX ORDER — FAMOTIDINE 10 MG/ML
20 INJECTION INTRAVENOUS
Status: CANCELLED | OUTPATIENT
Start: 2020-06-12

## 2020-05-29 RX ORDER — HEPARIN 100 UNIT/ML
500 SYRINGE INTRAVENOUS
Status: DISCONTINUED | OUTPATIENT
Start: 2020-05-29 | End: 2020-05-29 | Stop reason: HOSPADM

## 2020-05-29 RX ORDER — EPINEPHRINE 0.3 MG/.3ML
0.3 INJECTION SUBCUTANEOUS
Status: DISCONTINUED | OUTPATIENT
Start: 2020-05-29 | End: 2020-05-29 | Stop reason: HOSPADM

## 2020-05-29 RX ORDER — EPINEPHRINE 0.3 MG/.3ML
0.3 INJECTION SUBCUTANEOUS
Status: CANCELLED | OUTPATIENT
Start: 2020-06-12

## 2020-05-29 RX ORDER — DIPHENHYDRAMINE HYDROCHLORIDE 50 MG/ML
50 INJECTION INTRAMUSCULAR; INTRAVENOUS
Status: DISCONTINUED | OUTPATIENT
Start: 2020-05-29 | End: 2020-05-29 | Stop reason: HOSPADM

## 2020-05-29 RX ORDER — DIPHENHYDRAMINE HYDROCHLORIDE 50 MG/ML
50 INJECTION INTRAMUSCULAR; INTRAVENOUS
Status: CANCELLED | OUTPATIENT
Start: 2020-06-12

## 2020-05-29 RX ORDER — HEPARIN 100 UNIT/ML
500 SYRINGE INTRAVENOUS
Status: CANCELLED | OUTPATIENT
Start: 2020-06-12

## 2020-05-29 RX ORDER — SODIUM CHLORIDE 0.9 % (FLUSH) 0.9 %
10 SYRINGE (ML) INJECTION
Status: DISCONTINUED | OUTPATIENT
Start: 2020-05-29 | End: 2020-05-29 | Stop reason: HOSPADM

## 2020-05-29 RX ORDER — ACETAMINOPHEN 500 MG
1000 TABLET ORAL
Status: COMPLETED | OUTPATIENT
Start: 2020-05-29 | End: 2020-05-29

## 2020-05-29 RX ADMIN — ACETAMINOPHEN 1000 MG: 500 TABLET, FILM COATED ORAL at 09:05

## 2020-05-29 RX ADMIN — DIPHENHYDRAMINE HYDROCHLORIDE 50 MG: 50 INJECTION, SOLUTION INTRAMUSCULAR; INTRAVENOUS at 09:05

## 2020-05-29 RX ADMIN — FAMOTIDINE 20 MG: 10 INJECTION INTRAVENOUS at 09:05

## 2020-05-29 RX ADMIN — SODIUM CHLORIDE: 0.9 INJECTION, SOLUTION INTRAVENOUS at 09:05

## 2020-05-29 RX ADMIN — OCRELIZUMAB 300 MG: 300 INJECTION INTRAVENOUS at 10:05

## 2020-05-29 RX ADMIN — DEXTROSE MONOHYDRATE: 50 INJECTION, SOLUTION INTRAVENOUS at 09:05

## 2020-05-29 NOTE — PLAN OF CARE
1410-Patient tolerated treatment well, she was monitored for 1 hour post infusion with no issues. Discharged without complaints or S/S of adverse event. AVS given.  Instructed to call provider for any questions or concerns.

## 2020-05-29 NOTE — PLAN OF CARE
0930-Labs , hx, and medications reviewed. Assessment completed. Discussed plan of care with patient. Patient in agreement. Chair reclined and warm blanket and snack offered.

## 2020-06-02 ENCOUNTER — CLINICAL SUPPORT (OUTPATIENT)
Dept: INFECTIOUS DISEASES | Facility: CLINIC | Age: 35
End: 2020-06-02
Payer: COMMERCIAL

## 2020-06-02 ENCOUNTER — OFFICE VISIT (OUTPATIENT)
Dept: INFECTIOUS DISEASES | Facility: CLINIC | Age: 35
End: 2020-06-02
Payer: COMMERCIAL

## 2020-06-02 VITALS
DIASTOLIC BLOOD PRESSURE: 91 MMHG | WEIGHT: 273.81 LBS | BODY MASS INDEX: 55.2 KG/M2 | HEART RATE: 105 BPM | TEMPERATURE: 98 F | SYSTOLIC BLOOD PRESSURE: 136 MMHG | HEIGHT: 59 IN

## 2020-06-02 DIAGNOSIS — G35 MULTIPLE SCLEROSIS: ICD-10-CM

## 2020-06-02 DIAGNOSIS — D84.9 IMMUNOSUPPRESSION: Primary | ICD-10-CM

## 2020-06-02 PROCEDURE — 99999 PR PBB SHADOW E&M-EST. PATIENT-LVL IV: ICD-10-PCS | Mod: PBBFAC,,, | Performed by: INTERNAL MEDICINE

## 2020-06-02 PROCEDURE — 90670 PCV13 VACCINE IM: CPT | Mod: S$GLB,,, | Performed by: INTERNAL MEDICINE

## 2020-06-02 PROCEDURE — 90636 HEP A/HEP B VACC ADULT IM: CPT | Mod: S$GLB,,, | Performed by: INTERNAL MEDICINE

## 2020-06-02 PROCEDURE — 3075F SYST BP GE 130 - 139MM HG: CPT | Mod: CPTII,S$GLB,, | Performed by: INTERNAL MEDICINE

## 2020-06-02 PROCEDURE — 90670 PNEUMOCOCCAL CONJUGATE VACCINE 13-VALENT LESS THAN 5YO & GREATER THAN: ICD-10-PCS | Mod: S$GLB,,, | Performed by: INTERNAL MEDICINE

## 2020-06-02 PROCEDURE — 90715 TDAP VACCINE 7 YRS/> IM: CPT | Mod: S$GLB,,, | Performed by: INTERNAL MEDICINE

## 2020-06-02 PROCEDURE — 99204 OFFICE O/P NEW MOD 45 MIN: CPT | Mod: 25,S$GLB,, | Performed by: INTERNAL MEDICINE

## 2020-06-02 PROCEDURE — 3080F DIAST BP >= 90 MM HG: CPT | Mod: CPTII,S$GLB,, | Performed by: INTERNAL MEDICINE

## 2020-06-02 PROCEDURE — 3075F PR MOST RECENT SYSTOLIC BLOOD PRESS GE 130-139MM HG: ICD-10-PCS | Mod: CPTII,S$GLB,, | Performed by: INTERNAL MEDICINE

## 2020-06-02 PROCEDURE — 90472 HEPATITIS A HEPATITIS B COMBINED VACCINE IM: ICD-10-PCS | Mod: S$GLB,,, | Performed by: INTERNAL MEDICINE

## 2020-06-02 PROCEDURE — 3080F PR MOST RECENT DIASTOLIC BLOOD PRESSURE >= 90 MM HG: ICD-10-PCS | Mod: CPTII,S$GLB,, | Performed by: INTERNAL MEDICINE

## 2020-06-02 PROCEDURE — 90636 HEPATITIS A HEPATITIS B COMBINED VACCINE IM: ICD-10-PCS | Mod: S$GLB,,, | Performed by: INTERNAL MEDICINE

## 2020-06-02 PROCEDURE — 3008F PR BODY MASS INDEX (BMI) DOCUMENTED: ICD-10-PCS | Mod: CPTII,S$GLB,, | Performed by: INTERNAL MEDICINE

## 2020-06-02 PROCEDURE — 99204 PR OFFICE/OUTPT VISIT, NEW, LEVL IV, 45-59 MIN: ICD-10-PCS | Mod: 25,S$GLB,, | Performed by: INTERNAL MEDICINE

## 2020-06-02 PROCEDURE — 90715 TDAP VACCINE GREATER THAN OR EQUAL TO 7YO IM: ICD-10-PCS | Mod: S$GLB,,, | Performed by: INTERNAL MEDICINE

## 2020-06-02 PROCEDURE — 90686 IIV4 VACC NO PRSV 0.5 ML IM: CPT | Mod: S$GLB,,, | Performed by: INTERNAL MEDICINE

## 2020-06-02 PROCEDURE — 90686 FLU VACCINE (QUAD) GREATER THAN OR EQUAL TO 3YO PRESERVATIVE FREE IM: ICD-10-PCS | Mod: S$GLB,,, | Performed by: INTERNAL MEDICINE

## 2020-06-02 PROCEDURE — 3008F BODY MASS INDEX DOCD: CPT | Mod: CPTII,S$GLB,, | Performed by: INTERNAL MEDICINE

## 2020-06-02 PROCEDURE — 90471 FLU VACCINE (QUAD) GREATER THAN OR EQUAL TO 3YO PRESERVATIVE FREE IM: ICD-10-PCS | Mod: S$GLB,,, | Performed by: INTERNAL MEDICINE

## 2020-06-02 PROCEDURE — 99999 PR PBB SHADOW E&M-EST. PATIENT-LVL IV: CPT | Mod: PBBFAC,,, | Performed by: INTERNAL MEDICINE

## 2020-06-02 PROCEDURE — 90472 IMMUNIZATION ADMIN EACH ADD: CPT | Mod: S$GLB,,, | Performed by: INTERNAL MEDICINE

## 2020-06-02 PROCEDURE — 90471 IMMUNIZATION ADMIN: CPT | Mod: S$GLB,,, | Performed by: INTERNAL MEDICINE

## 2020-06-02 NOTE — PROGRESS NOTES
Infectious Diseases Clinic Note    Subjective:       Patient ID: Sandra France is a 34 y.o. female.    Chief Complaint: No chief complaint on file.    HPI    33 y/o F h/o MS dx with retrobulbar optic neuritis 6/2018 uncontrolled, soon to start ocrevus here for vaccine eval      From [place], works as  at vet clinic (only dogs and cats)  Has dog at home, no other animal exposure  In past went to [place]  No known TB exposure  [place]  No raw oysters   Had shingles 8 years       Past Medical History:   Diagnosis Date    Anemia     Anxiety     Anxiety     Back pain     Bleeds easily     Change in bowel habit     Changes in skin texture     Confusion     Depression     Depression     Dizziness     Dry skin     Fatigue     Has daytime drowsiness     Headache     Heartburn     History of weakness of extremity     HTN (hypertension)     Hx of psychiatric care     Hyperthyroidism     Insomnia     Lethargy     Loose stools     Memory loss     Migraine     MS (multiple sclerosis)     Neck pain     Neuropathy     Numbness of extremity     Optic neuritis     PCOS (polycystic ovarian syndrome)     Psychiatric problem     Rash     Restless sleeper     Stomach pain     Therapy     Thyroid disease     Thyroid disease     Tingling in extremities     Tiredness     Trouble walking     Unsteady gait     Urine troubles     Voiding dysfunction     Wheezing     Worries        Social History     Socioeconomic History    Marital status:      Spouse name: Not on file    Number of children: Not on file    Years of education: Not on file    Highest education level: Not on file   Occupational History    Not on file   Social Needs    Financial resource strain: Not on file    Food insecurity:     Worry: Not on file     Inability: Not on file    Transportation needs:     Medical: Not on file     Non-medical: Not on file   Tobacco Use    Smoking status: Current Every Day  Smoker     Packs/day: 0.25     Years: 12.00     Pack years: 3.00     Types: Cigarettes    Smokeless tobacco: Never Used   Substance and Sexual Activity    Alcohol use: No    Drug use: Never    Sexual activity: Yes   Lifestyle    Physical activity:     Days per week: Not on file     Minutes per session: Not on file    Stress: Not on file   Relationships    Social connections:     Talks on phone: Not on file     Gets together: Not on file     Attends Holiness service: Not on file     Active member of club or organization: Not on file     Attends meetings of clubs or organizations: Not on file     Relationship status: Not on file   Other Topics Concern    Patient feels they ought to cut down on drinking/drug use Not Asked    Patient annoyed by others criticizing their drinking/drug use Not Asked    Patient has felt bad or guilty about drinking/drug use Not Asked    Patient has had a drink/used drugs as an eye opener in the AM Not Asked   Social History Narrative    Not on file         Current Outpatient Medications:     buPROPion (WELLBUTRIN XL) 300 MG 24 hr tablet, Take 1 tablet (300 mg total) by mouth once daily., Disp: 90 tablet, Rfl: 1    coenzyme Q10 10 mg capsule, Take 500 mg by mouth once daily., Disp: , Rfl:     dexAMETHasone (DECADRON) 6 MG tablet, Take 33 tabs daily for 2 days (66 tablets) (Patient not taking: Reported on 5/13/2020), Disp: 66 tablet, Rfl: 0    drospirenone, contraceptive, (SLYND) 4 mg (28) Tab, Take 4 mg by mouth once daily., Disp: , Rfl:     ergocalciferol, vitamin D2, (VITAMIN D ORAL), Take 5,000 Units by mouth once daily. , Disp: , Rfl:     escitalopram oxalate (LEXAPRO) 10 MG tablet, Take 10 mg by mouth once daily., Disp: , Rfl: 5    FERROUS SULFATE ORAL, Take 1 tablet by mouth once daily. , Disp: , Rfl:     fesoterodine (TOVIAZ) 4 mg Tb24, Take 1 tablet (4 mg total) by mouth once daily., Disp: 30 tablet, Rfl: 11    gabapentin (NEURONTIN) 100 MG capsule, Take 1  capsule (100 mg total) by mouth 3 (three) times daily., Disp: 90 capsule, Rfl: 0    glatiramer (COPAXONE, GLATOPA) 40 mg/mL injection, Inject 40 mg into the skin 3 (three) times a week., Disp: 12 mL, Rfl: 12    ibuprofen (ADVIL,MOTRIN) 200 MG tablet, Take 800 mg by mouth daily as needed for Pain., Disp: , Rfl:     levothyroxine (SYNTHROID) 75 MCG tablet, Take 75 mcg by mouth before breakfast. , Disp: , Rfl:     liothyronine (CYTOMEL) 5 MCG Tab, Take 10 mcg by mouth once daily. , Disp: , Rfl:     lisinopril 10 MG tablet, Take 10 mg by mouth once daily., Disp: , Rfl:     magnesium glycinate 100 mg Tab, Take 400 mg by mouth once., Disp: , Rfl:     melatonin 5 mg Chew, Take 5 mg by mouth nightly., Disp: , Rfl:     omeprazole/sodium bicarbonate (ZEGERID OTC ORAL), Take 1 tablet by mouth 2 (two) times daily as needed. , Disp: , Rfl:     topiramate (TOPAMAX) 100 MG tablet, Take 1 tablet (100 mg total) by mouth 2 (two) times daily., Disp: 60 tablet, Rfl: 3    Review of Systems   Constitutional: Negative for activity change, chills and fever.   HENT: Negative for congestion, mouth sores, rhinorrhea, sinus pressure and sore throat.    Eyes: Negative for photophobia, pain and redness.   Respiratory: Negative for cough, chest tightness, shortness of breath and wheezing.    Cardiovascular: Negative for chest pain and leg swelling.   Gastrointestinal: Negative for abdominal distention, abdominal pain, diarrhea, nausea and vomiting.   Endocrine: Negative for polyuria.   Genitourinary: Negative for decreased urine volume, dysuria and flank pain.   Musculoskeletal: Negative for joint swelling and neck pain.   Skin: Negative for color change.   Allergic/Immunologic: Negative for food allergies.   Neurological: Negative for dizziness, weakness and headaches.   Hematological: Negative for adenopathy.   Psychiatric/Behavioral: Negative for agitation and confusion. The patient is not nervous/anxious.            Objective:        Vitals:    06/02/20 1423   BP: (!) 136/91   Pulse: 105   Temp: 98 °F (36.7 °C)       There were no vitals filed for this visit.  Physical Exam   Constitutional: She is oriented to person, place, and time. She appears well-developed and well-nourished. No distress.   HENT:   Head: Normocephalic and atraumatic.   Mouth/Throat: Oropharynx is clear and moist.   Eyes: Conjunctivae and EOM are normal. No scleral icterus.   Neck: Normal range of motion. Neck supple.   Cardiovascular: Normal rate and regular rhythm.   No murmur heard.  Pulmonary/Chest: Effort normal and breath sounds normal. No respiratory distress. She has no wheezes.   Abdominal: Soft. Bowel sounds are normal. She exhibits no distension.   Musculoskeletal: Normal range of motion. She exhibits no edema or tenderness.   Lymphadenopathy:     She has no cervical adenopathy.   Neurological: She is alert and oriented to person, place, and time. Coordination normal.   Skin: Skin is warm and dry. No rash noted. No erythema.   Psychiatric: She has a normal mood and affect. Her behavior is normal.           Assessment/Plan:       No diagnosis found.      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

## 2020-06-02 NOTE — LETTER
June 4, 2020      Kailey Sommers, APRN, CNS  [address]  [facility] [address]           [facility] - Infectious Diseases  [address]  [facility] [address]  Phone: [phone]  Fax: [phone]          Patient: Sandra France   MR Number: [id number]   YOB: 1985   Date of Visit: 6/2/2020       Dear Kailey Sommers:    Thank you for referring Sandra France to me for evaluation. Attached you will find relevant portions of my assessment and plan of care.    If you have questions, please do not hesitate to call me. I look forward to following Sandra France along with you.    Sincerely,    Jez Mitchell MD    Enclosure  CC:  No Recipients    If you would like to receive this communication electronically, please contact [email or web address] or [phone] to request more information on [place] Link access.    For providers and/or their staff who would like to refer a patient to [facility], please contact us through our one-stop-shop provider referral line, [facility], at [phone].    If you feel you have received this communication in error or would no longer like to receive these types of communications, please e-mail [email or web address]

## 2020-06-02 NOTE — PROGRESS NOTES
received Twinrix(first dose), Flu, Tdap, and Prevnar 13 vaccines. Tolerated well. Left unit in NAD.

## 2020-06-10 ENCOUNTER — CLINICAL SUPPORT (OUTPATIENT)
Dept: URGENT CARE | Facility: CLINIC | Age: 35
End: 2020-06-10
Payer: COMMERCIAL

## 2020-06-10 VITALS
BODY MASS INDEX: 55.04 KG/M2 | TEMPERATURE: 97 F | OXYGEN SATURATION: 100 % | HEIGHT: 59 IN | WEIGHT: 273 LBS | HEART RATE: 99 BPM

## 2020-06-10 DIAGNOSIS — Z13.9 SCREENING FOR CONDITION: ICD-10-CM

## 2020-06-10 PROCEDURE — U0003 INFECTIOUS AGENT DETECTION BY NUCLEIC ACID (DNA OR RNA); SEVERE ACUTE RESPIRATORY SYNDROME CORONAVIRUS 2 (SARS-COV-2) (CORONAVIRUS DISEASE [COVID-19]), AMPLIFIED PROBE TECHNIQUE, MAKING USE OF HIGH THROUGHPUT TECHNOLOGIES AS DESCRIBED BY CMS-2020-01-R: HCPCS

## 2020-06-11 LAB — SARS-COV-2 RNA RESP QL NAA+PROBE: NOT DETECTED

## 2020-06-12 ENCOUNTER — INFUSION (OUTPATIENT)
Dept: INFUSION THERAPY | Facility: HOSPITAL | Age: 35
End: 2020-06-12
Attending: PSYCHIATRY & NEUROLOGY
Payer: COMMERCIAL

## 2020-06-12 VITALS
BODY MASS INDEX: 54.71 KG/M2 | DIASTOLIC BLOOD PRESSURE: 75 MMHG | HEIGHT: 59 IN | WEIGHT: 271.38 LBS | HEART RATE: 101 BPM | TEMPERATURE: 98 F | RESPIRATION RATE: 18 BRPM | SYSTOLIC BLOOD PRESSURE: 139 MMHG

## 2020-06-12 DIAGNOSIS — G35 MULTIPLE SCLEROSIS: Primary | ICD-10-CM

## 2020-06-12 PROCEDURE — 96375 TX/PRO/DX INJ NEW DRUG ADDON: CPT

## 2020-06-12 PROCEDURE — 96366 THER/PROPH/DIAG IV INF ADDON: CPT

## 2020-06-12 PROCEDURE — 96365 THER/PROPH/DIAG IV INF INIT: CPT

## 2020-06-12 PROCEDURE — 25000003 PHARM REV CODE 250: Performed by: PSYCHIATRY & NEUROLOGY

## 2020-06-12 PROCEDURE — 96367 TX/PROPH/DG ADDL SEQ IV INF: CPT

## 2020-06-12 PROCEDURE — S0028 INJECTION, FAMOTIDINE, 20 MG: HCPCS | Performed by: PSYCHIATRY & NEUROLOGY

## 2020-06-12 PROCEDURE — 63600175 PHARM REV CODE 636 W HCPCS: Mod: TB | Performed by: PSYCHIATRY & NEUROLOGY

## 2020-06-12 RX ORDER — FAMOTIDINE 10 MG/ML
20 INJECTION INTRAVENOUS
Status: CANCELLED | OUTPATIENT
Start: 2020-11-13

## 2020-06-12 RX ORDER — FAMOTIDINE 10 MG/ML
20 INJECTION INTRAVENOUS
Status: COMPLETED | OUTPATIENT
Start: 2020-06-12 | End: 2020-06-12

## 2020-06-12 RX ORDER — DIPHENHYDRAMINE HYDROCHLORIDE 50 MG/ML
50 INJECTION INTRAMUSCULAR; INTRAVENOUS
Status: CANCELLED | OUTPATIENT
Start: 2020-11-13

## 2020-06-12 RX ORDER — ACETAMINOPHEN 500 MG
1000 TABLET ORAL
Status: CANCELLED | OUTPATIENT
Start: 2020-11-13

## 2020-06-12 RX ORDER — EPINEPHRINE 0.3 MG/.3ML
0.3 INJECTION SUBCUTANEOUS
Status: DISCONTINUED | OUTPATIENT
Start: 2020-06-12 | End: 2020-06-12 | Stop reason: HOSPADM

## 2020-06-12 RX ORDER — HEPARIN 100 UNIT/ML
500 SYRINGE INTRAVENOUS
Status: CANCELLED | OUTPATIENT
Start: 2020-11-13

## 2020-06-12 RX ORDER — SODIUM CHLORIDE 0.9 % (FLUSH) 0.9 %
10 SYRINGE (ML) INJECTION
Status: CANCELLED | OUTPATIENT
Start: 2020-11-13

## 2020-06-12 RX ORDER — ACETAMINOPHEN 500 MG
1000 TABLET ORAL
Status: COMPLETED | OUTPATIENT
Start: 2020-06-12 | End: 2020-06-12

## 2020-06-12 RX ORDER — SODIUM CHLORIDE 0.9 % (FLUSH) 0.9 %
10 SYRINGE (ML) INJECTION
Status: DISCONTINUED | OUTPATIENT
Start: 2020-06-12 | End: 2020-06-12 | Stop reason: HOSPADM

## 2020-06-12 RX ORDER — DIPHENHYDRAMINE HYDROCHLORIDE 50 MG/ML
50 INJECTION INTRAMUSCULAR; INTRAVENOUS
Status: DISCONTINUED | OUTPATIENT
Start: 2020-06-12 | End: 2020-06-12 | Stop reason: HOSPADM

## 2020-06-12 RX ORDER — HEPARIN 100 UNIT/ML
500 SYRINGE INTRAVENOUS
Status: DISCONTINUED | OUTPATIENT
Start: 2020-06-12 | End: 2020-06-12 | Stop reason: HOSPADM

## 2020-06-12 RX ORDER — EPINEPHRINE 0.3 MG/.3ML
0.3 INJECTION SUBCUTANEOUS
Status: CANCELLED | OUTPATIENT
Start: 2020-11-13

## 2020-06-12 RX ADMIN — DEXTROSE: 50 INJECTION, SOLUTION INTRAVENOUS at 09:06

## 2020-06-12 RX ADMIN — FAMOTIDINE 20 MG: 10 INJECTION INTRAVENOUS at 09:06

## 2020-06-12 RX ADMIN — DIPHENHYDRAMINE HYDROCHLORIDE 50 MG: 50 INJECTION INTRAMUSCULAR; INTRAVENOUS at 09:06

## 2020-06-12 RX ADMIN — OCRELIZUMAB 300 MG: 300 INJECTION INTRAVENOUS at 10:06

## 2020-06-12 RX ADMIN — SODIUM CHLORIDE: 0.9 INJECTION, SOLUTION INTRAVENOUS at 09:06

## 2020-06-12 RX ADMIN — ACETAMINOPHEN 1000 MG: 500 TABLET, FILM COATED ORAL at 09:06

## 2020-06-12 NOTE — PLAN OF CARE
1415 Pt completed C2d1 Ocrevus. Treatment tolerated well without issues. PIV removed, catheter tip intact, pressure dressing applied. Pt uses MyChart and is aware of future appointments. Ambulatory out of infusion independently.

## 2020-06-12 NOTE — PLAN OF CARE
0900 Pt arrived for C2D1 Ocrevus. She is to receive 2/2. Pt tolerated first treatment well, with no experienced side effects post infusion. Pt has no major complaints this morning. Feels disease process is responding well to current therapy. PIV initiated to RFA. Flushing freely, and blood return noted. Pt resting in chair. VSS. Blankets/snacks offered. WCTM pt closely.

## 2020-07-02 ENCOUNTER — CLINICAL SUPPORT (OUTPATIENT)
Dept: INFECTIOUS DISEASES | Facility: CLINIC | Age: 35
End: 2020-07-02
Payer: COMMERCIAL

## 2020-07-02 PROCEDURE — 90471 HEPATITIS A HEPATITIS B COMBINED VACCINE IM: ICD-10-PCS | Mod: S$GLB,,, | Performed by: INTERNAL MEDICINE

## 2020-07-02 PROCEDURE — 90472 IMMUNIZATION ADMIN EACH ADD: CPT | Mod: S$GLB,,, | Performed by: INTERNAL MEDICINE

## 2020-07-02 PROCEDURE — 90636 HEP A/HEP B VACC ADULT IM: CPT | Mod: S$GLB,,, | Performed by: INTERNAL MEDICINE

## 2020-07-02 PROCEDURE — 90471 IMMUNIZATION ADMIN: CPT | Mod: S$GLB,,, | Performed by: INTERNAL MEDICINE

## 2020-07-02 PROCEDURE — 90472 ZOSTER RECOMBINANT VACCINE: ICD-10-PCS | Mod: S$GLB,,, | Performed by: INTERNAL MEDICINE

## 2020-07-02 PROCEDURE — 90750 ZOSTER RECOMBINANT VACCINE: ICD-10-PCS | Mod: S$GLB,,, | Performed by: INTERNAL MEDICINE

## 2020-07-02 PROCEDURE — 90636 HEPATITIS A HEPATITIS B COMBINED VACCINE IM: ICD-10-PCS | Mod: S$GLB,,, | Performed by: INTERNAL MEDICINE

## 2020-07-02 PROCEDURE — 90750 HZV VACC RECOMBINANT IM: CPT | Mod: S$GLB,,, | Performed by: INTERNAL MEDICINE

## 2020-07-14 ENCOUNTER — PATIENT MESSAGE (OUTPATIENT)
Dept: NEUROLOGY | Facility: CLINIC | Age: 35
End: 2020-07-14

## 2020-07-15 RX ORDER — TOPIRAMATE 100 MG/1
100 TABLET, FILM COATED ORAL 2 TIMES DAILY
Qty: 60 TABLET | Refills: 5 | Status: SHIPPED | OUTPATIENT
Start: 2020-07-15 | End: 2020-09-02 | Stop reason: SDUPTHER

## 2020-08-13 DIAGNOSIS — R53.83 FATIGUE, UNSPECIFIED TYPE: Primary | ICD-10-CM

## 2020-08-19 ENCOUNTER — OFFICE VISIT (OUTPATIENT)
Dept: NEUROLOGY | Facility: CLINIC | Age: 35
End: 2020-08-19
Payer: COMMERCIAL

## 2020-08-19 ENCOUNTER — LAB VISIT (OUTPATIENT)
Dept: LAB | Facility: HOSPITAL | Age: 35
End: 2020-08-19
Payer: COMMERCIAL

## 2020-08-19 ENCOUNTER — CLINICAL SUPPORT (OUTPATIENT)
Dept: INFECTIOUS DISEASES | Facility: CLINIC | Age: 35
End: 2020-08-19
Payer: COMMERCIAL

## 2020-08-19 VITALS
WEIGHT: 268.88 LBS | HEART RATE: 104 BPM | HEIGHT: 59 IN | BODY MASS INDEX: 54.2 KG/M2 | TEMPERATURE: 98 F | SYSTOLIC BLOOD PRESSURE: 125 MMHG | DIASTOLIC BLOOD PRESSURE: 88 MMHG

## 2020-08-19 DIAGNOSIS — R53.83 FATIGUE, UNSPECIFIED TYPE: ICD-10-CM

## 2020-08-19 DIAGNOSIS — G47.33 OSA (OBSTRUCTIVE SLEEP APNEA): Primary | ICD-10-CM

## 2020-08-19 DIAGNOSIS — D84.9 IMMUNOSUPPRESSION: ICD-10-CM

## 2020-08-19 DIAGNOSIS — Z71.89 COUNSELING REGARDING GOALS OF CARE: ICD-10-CM

## 2020-08-19 DIAGNOSIS — G35 MULTIPLE SCLEROSIS: ICD-10-CM

## 2020-08-19 LAB — VIT B12 SERPL-MCNC: 448 PG/ML (ref 210–950)

## 2020-08-19 PROCEDURE — 90732 PPSV23 VACC 2 YRS+ SUBQ/IM: CPT | Mod: S$GLB,,, | Performed by: INTERNAL MEDICINE

## 2020-08-19 PROCEDURE — 99215 PR OFFICE/OUTPT VISIT, EST, LEVL V, 40-54 MIN: ICD-10-PCS | Mod: S$GLB,,, | Performed by: PSYCHIATRY & NEUROLOGY

## 2020-08-19 PROCEDURE — 90732 PNEUMOCOCCAL POLYSACCHARIDE VACCINE 23-VALENT =>2YO SQ IM: ICD-10-PCS | Mod: S$GLB,,, | Performed by: INTERNAL MEDICINE

## 2020-08-19 PROCEDURE — 90471 IMMUNIZATION ADMIN: CPT | Mod: S$GLB,,, | Performed by: INTERNAL MEDICINE

## 2020-08-19 PROCEDURE — 82607 VITAMIN B-12: CPT

## 2020-08-19 PROCEDURE — 3008F BODY MASS INDEX DOCD: CPT | Mod: CPTII,S$GLB,, | Performed by: PSYCHIATRY & NEUROLOGY

## 2020-08-19 PROCEDURE — 3079F DIAST BP 80-89 MM HG: CPT | Mod: CPTII,S$GLB,, | Performed by: PSYCHIATRY & NEUROLOGY

## 2020-08-19 PROCEDURE — 99999 PR PBB SHADOW E&M-EST. PATIENT-LVL I: ICD-10-PCS | Mod: PBBFAC,,,

## 2020-08-19 PROCEDURE — 3074F PR MOST RECENT SYSTOLIC BLOOD PRESSURE < 130 MM HG: ICD-10-PCS | Mod: CPTII,S$GLB,, | Performed by: PSYCHIATRY & NEUROLOGY

## 2020-08-19 PROCEDURE — 90471 PNEUMOCOCCAL POLYSACCHARIDE VACCINE 23-VALENT =>2YO SQ IM: ICD-10-PCS | Mod: S$GLB,,, | Performed by: INTERNAL MEDICINE

## 2020-08-19 PROCEDURE — 3074F SYST BP LT 130 MM HG: CPT | Mod: CPTII,S$GLB,, | Performed by: PSYCHIATRY & NEUROLOGY

## 2020-08-19 PROCEDURE — 99999 PR PBB SHADOW E&M-EST. PATIENT-LVL III: ICD-10-PCS | Mod: PBBFAC,,, | Performed by: PSYCHIATRY & NEUROLOGY

## 2020-08-19 PROCEDURE — 3079F PR MOST RECENT DIASTOLIC BLOOD PRESSURE 80-89 MM HG: ICD-10-PCS | Mod: CPTII,S$GLB,, | Performed by: PSYCHIATRY & NEUROLOGY

## 2020-08-19 PROCEDURE — 99215 OFFICE O/P EST HI 40 MIN: CPT | Mod: S$GLB,,, | Performed by: PSYCHIATRY & NEUROLOGY

## 2020-08-19 PROCEDURE — 99999 PR PBB SHADOW E&M-EST. PATIENT-LVL I: CPT | Mod: PBBFAC,,,

## 2020-08-19 PROCEDURE — 99999 PR PBB SHADOW E&M-EST. PATIENT-LVL III: CPT | Mod: PBBFAC,,, | Performed by: PSYCHIATRY & NEUROLOGY

## 2020-08-19 PROCEDURE — 36415 COLL VENOUS BLD VENIPUNCTURE: CPT

## 2020-08-19 PROCEDURE — 3008F PR BODY MASS INDEX (BMI) DOCUMENTED: ICD-10-PCS | Mod: CPTII,S$GLB,, | Performed by: PSYCHIATRY & NEUROLOGY

## 2020-08-19 RX ORDER — GABAPENTIN 100 MG/1
100 CAPSULE ORAL NIGHTLY
Start: 2020-08-19 | End: 2021-11-16 | Stop reason: SDUPTHER

## 2020-08-19 NOTE — Clinical Note
Will get 6 mo f/u MRI on Ocrevus in October with f/u with AP same day; will make final decision on Nov Ocrevus that day;

## 2020-08-19 NOTE — PROGRESS NOTES
"Subjective:          Patient ID: Sandra France is a 34 y.o. female who presents today for a routine clinic visit for MS.      MS HPI:  · DMT: ocrelizumab starting May 2020  · Side effects from DMT? No  · Taking vitamin D3 as recommended? Yes -  7,000 daily  · Follows with Dr. Lyn / [facility] Arriaga for headaches. On Topamax since November;  In general does not feel brain fog.  · In April, she did develop a sense of brain fog; this improved "a little" after steroids, and significantly after Ocrevus;    · Now, she no longer has brain fog, but is struggling with fatigue.   · Takes Wellbutrin XL 300mg daily and Lexapro 10mg/day--previously prescribed by psychiatrist, not prescribed by PCP.   · Not sure whether she snores b/c her  snores loudly; she does not sleep through the night; taking 10mg of Melatonin.   ·     Medications:  Current Outpatient Medications   Medication Sig    buPROPion (WELLBUTRIN XL) 300 MG 24 hr tablet Take 1 tablet (300 mg total) by mouth once daily.    coenzyme Q10 10 mg capsule Take 500 mg by mouth once daily.    dexAMETHasone (DECADRON) 6 MG tablet Take 33 tabs daily for 2 days (66 tablets)    drospirenone, contraceptive, (SLYND) 4 mg (28) Tab Take 4 mg by mouth once daily.    ergocalciferol, vitamin D2, (VITAMIN D ORAL) Take 5,000 Units by mouth once daily.     escitalopram oxalate (LEXAPRO) 10 MG tablet Take 10 mg by mouth once daily.    FERROUS SULFATE ORAL Take 1 tablet by mouth once daily.     fesoterodine (TOVIAZ) 4 mg Tb24 Take 1 tablet (4 mg total) by mouth once daily.    gabapentin (NEURONTIN) 100 MG capsule Take 1 capsule (100 mg total) by mouth 3 (three) times daily. (Patient taking differently: Take 100 mg by mouth every evening. )    glatiramer (COPAXONE, GLATOPA) 40 mg/mL injection Inject 40 mg into the skin 3 (three) times a week.    ibuprofen (ADVIL,MOTRIN) 200 MG tablet Take 800 mg by mouth daily as needed for Pain.    levothyroxine (SYNTHROID) 75 " MCG tablet Take 75 mcg by mouth before breakfast.     liothyronine (CYTOMEL) 5 MCG Tab Take 10 mcg by mouth once daily.     lisinopril 10 MG tablet Take 10 mg by mouth once daily.    magnesium glycinate 100 mg Tab Take 400 mg by mouth once.    melatonin 5 mg Chew Take 5 mg by mouth nightly.    omeprazole/sodium bicarbonate (ZEGERID OTC ORAL) Take 1 tablet by mouth 2 (two) times daily as needed.     topiramate (TOPAMAX) 100 MG tablet Take 1 tablet (100 mg total) by mouth 2 (two) times daily.     No current facility-administered medications for this visit.        SOCIAL HISTORY  Social History     Tobacco Use    Smoking status: Former Smoker     Packs/day: 0.25     Years: 12.00     Pack years: 3.00     Types: Cigarettes     Start date: 8/17/2020    Smokeless tobacco: Never Used   Substance Use Topics    Alcohol use: No    Drug use: Never       Living arrangements - the patient lives with their family.  Work-- at [employer] clinic    REVIEW OF SYMPTOMS 8/17/2020   Do you feel abnormally tired on most days? Yes   Do you feel you generally sleep well? No   Do you have difficulty controlling your bladder?  Yes--on Toviaz; prescribed by Dr. Campbell; this has helped;  Did pelvic floor therapy   Do you have difficulty controlling your bowels?  No   Do you have frequent muscle cramps, tightness or spasms in your limbs?  No   Do you have new visual symptoms?  No   Do you have worsening difficulty with your memory or thinking? No   Do you have worsening symptoms of anxiety or depression?  No--on meds   For patients who walk, Do you have more difficulty walking?  Not Applicable   Have you fallen since your last visit?  No   For patients who use wheelchairs: Do you have any skin wounds or breakdown? Not Applicable   Do you have difficulty using your hands?  No   Do you have shooting or burning pain? No   Do you have difficulty with sexual function?  No   If you are sexually active, are you using birth control?  Y/N  N/A Yes   Do you often choke when swallowing liquids or solid food?  No   Do you experience worsening symptoms when overheated? Yes   Do you need any new equipment such as a wheelchair, walker or shower chair? No   Do you receive co-pay financial assistance for your principal MS medicine? No   Would you be interested in participating in an MS research trial in the future? Yes   For patients on Gilenya, Tecfidera, Aubagio, Rituxan, Ocrevus, Tysabri, Lemtrada or Methotrexate, are you aware that you should NOT receive live virus vaccines?  Yes   Do you feel you have adequate family/friend support?  Yes   Do you have health insurance?   Yes   Are you currently employed? Yes   Do you receive SSDI/SSI?  Not Applicable   Do you use marijuana or cannabis products? No   Have you been diagnosed with a urinary tract infection since your last visit here? No   Have you been diagnosed with a respiratory tract infection since your last visit here? No   Have you been to the emergency room since your last visit here? No   Have you been hospitalized since your last visit here?  No            Objective:        Timed 25 Foot Walk: 5/13/2020 8/19/2020   Did patient wear an AFO? No No   Was assistive device used? No No   Time for 25 Foot Walk (seconds) 5.1 4.16   Time for 25 Foot Walk (seconds) - 4.2     Neurologic Exam  MS: fluent, attentive, good fund of knowledge  CN: no NKECHI, no dysarthria  MOTOR: 5/5 all groups  REFLEXES: 2+ t/0  SENS: normal  COORD: normal  GAIT: normal      Imaging:     Results for orders placed during the hospital encounter of 07/31/19   MRI Brain Demyelinating Without Contrast    Impression 1. Multiple areas of abnormal signal alteration involving the periventricular white matter best demonstrated on T2/FLAIR imaging compatible with patient's history of multiple sclerosis.  2. New 12 mm lesion within the right parietal lobe with increased signal intensity on diffusion-weighted imaging not present on the  previous study from July 24, 2018.      Electronically signed by: Manny Blandon MD  Date:    07/31/2019  Time:    11:28     No results found for this or any previous visit.  No results found for this or any previous visit.  Results for orders placed during the hospital encounter of 05/06/20   MRI Brain Demyelinating W W/O Contrast    Impression Interval development of scattered small to punctate size new T2 FLAIR hyperintense lesions with corresponding enhancement throughout the brain parenchyma as detailed above.  In light of history and prior findings most compatible with active areas of demyelination with largest focus of enhancement measuring 9 mm in the right posterior temporal deep white matter.  Clinical correlation and follow-up recommended.      Electronically signed by: Edward Elena DO  Date:    05/06/2020  Time:    16:33     Results for orders placed during the hospital encounter of 09/30/19   MRI Cervical Spine Demyelinating W W/O Contrast    Impression Subtle short-segment STIR signal hyperintensity cervical spinal cord dorsally at the C6 vertebral body level which may be artifactual without corresponding signal abnormality on additional sequences.  Sequela of prior demyelination to be considered in light of history.    No evidence for additional edema signal throughout the remaining cervicothoracic cord.  No abnormal intrathecal enhancement.    Mild degenerative changes as detailed above      Electronically signed by: Edward Elena DO  Date:    09/30/2019  Time:    11:13     Results for orders placed during the hospital encounter of 09/30/19   MRI Thoracic Spine Demyelinating W W/O Contrast    Impression Subtle short-segment STIR signal hyperintensity cervical spinal cord dorsally at the C6 vertebral body level which may be artifactual without corresponding signal abnormality on additional sequences.  Sequela of prior demyelination to be considered in light of history.    No evidence for additional  edema signal throughout the remaining cervicothoracic cord.  No abnormal intrathecal enhancement.    Mild degenerative changes as detailed above      Electronically signed by: Edward Elena DO  Date:    09/30/2019  Time:    11:13         Labs:     Lab Results   Component Value Date    IOLAWACC99CY 34 05/14/2020    QSGJYGRO38UO 60 09/30/2019    UAERWIDQ82LK 39 08/03/2018     Lab Results   Component Value Date    JCVINDEX 0.12 05/14/2020    JCVANTIBODY Negative 05/14/2020     Lab Results   Component Value Date    JK2WNQZT 81.0 05/14/2020    ABSOLUTECD3 5009.0 (H) 05/14/2020    RW1GBVEE 21.9 05/14/2020    ABSOLUTECD8 1351.0 (H) 05/14/2020    NI7SGKAL 58.7 (H) 05/14/2020    ABSOLUTECD4 3629.0 (H) 05/14/2020    LABCD48 2.69 05/14/2020     Lab Results   Component Value Date    WBC 13.36 (H) 05/27/2020    RBC 4.30 05/27/2020    HGB 12.1 05/27/2020    HCT 38.4 05/27/2020    MCV 89 05/27/2020    MCH 28.1 05/27/2020    MCHC 31.5 (L) 05/27/2020    RDW 15.3 (H) 05/27/2020     05/27/2020    MPV 9.1 (L) 05/27/2020    GRAN 9.2 (H) 05/27/2020    GRAN 69.1 05/27/2020    LYMPH 3.1 05/27/2020    LYMPH 22.9 05/27/2020    MONO 0.7 05/27/2020    MONO 5.1 05/27/2020    EOS 0.3 05/27/2020    BASO 0.07 05/27/2020    EOSINOPHIL 1.9 05/27/2020    BASOPHIL 0.5 05/27/2020     Sodium   Date Value Ref Range Status   05/14/2020 137 136 - 145 mmol/L Final     Potassium   Date Value Ref Range Status   05/14/2020 3.7 3.5 - 5.1 mmol/L Final     Chloride   Date Value Ref Range Status   05/14/2020 104 95 - 110 mmol/L Final     CO2   Date Value Ref Range Status   05/14/2020 24 23 - 29 mmol/L Final     Glucose   Date Value Ref Range Status   05/14/2020 96 70 - 110 mg/dL Final     BUN, Bld   Date Value Ref Range Status   05/14/2020 24 (H) 6 - 20 mg/dL Final     Creatinine   Date Value Ref Range Status   05/14/2020 0.7 0.5 - 1.4 mg/dL Final     Calcium   Date Value Ref Range Status   05/14/2020 8.4 (L) 8.7 - 10.5 mg/dL Final     Total Protein   Date  Value Ref Range Status   05/14/2020 6.2 6.0 - 8.4 g/dL Final     Albumin   Date Value Ref Range Status   05/14/2020 3.3 (L) 3.5 - 5.2 g/dL Final     Total Bilirubin   Date Value Ref Range Status   05/14/2020 0.8 0.1 - 1.0 mg/dL Final     Comment:     For infants and newborns, interpretation of results should be based  on gestational age, weight and in agreement with clinical  observations.  Premature Infant recommended reference ranges:  Up to 24 hours.............<8.0 mg/dL  Up to 48 hours............<12.0 mg/dL  3-5 days..................<15.0 mg/dL  6-29 days.................<15.0 mg/dL       Alkaline Phosphatase   Date Value Ref Range Status   05/14/2020 56 55 - 135 U/L Final     AST   Date Value Ref Range Status   05/14/2020 7 (L) 10 - 40 U/L Final     ALT   Date Value Ref Range Status   05/14/2020 20 10 - 44 U/L Final     Anion Gap   Date Value Ref Range Status   05/14/2020 9 8 - 16 mmol/L Final     eGFR if    Date Value Ref Range Status   05/14/2020 >60 >60 mL/min/1.73 m^2 Final     eGFR if non    Date Value Ref Range Status   05/14/2020 >60 >60 mL/min/1.73 m^2 Final     Comment:     Calculation used to obtain the estimated glomerular filtration  rate (eGFR) is the CKD-EPI equation.        Lab Results   Component Value Date    HEPBSAG Negative 05/14/2020    HEPBSAB Negative 05/14/2020    HEPBCAB Negative 05/14/2020           MS Impression and Plan:     NEURO MULTIPLE SCLEROSIS IMPRESSION:   MS Status:     Number of relapses in the past year?:  1    Clinical Progression:  Improved    MRI Progression:  Worsened (new lesions)  Plan:     DMT:  No change in management    DMT comment:  Continue Ocrevus for now    Symptom Management:  Implement change in symptom management (Sleep study ordered; if normal, suggest referral to Sleep Disorders clinic)    Implement Change in Symptom Management:  Fatigue and Sleep     Next Imaging Due: 10/19/2020     Next Labs Due: 10/19/2020     We  discussed her immunosuppressed status and the risk of increased form of illness should she contract COVID-19 and pt expressed understanding.  The importance of social distancing, extra precautions as recommended by NMSS, and hygiene guidelines were emphasized.     F/u Kailey Sommers CNS in October same day as MRI          Our visit today lasted 40 minutes, and 100% of this time was spent face to face with the patient. Over 50% of this visit included discussion of the treatment plan/medication changes/symptom management/exam findings/imaging results/coordination of care. The patient agrees with the plan of care.    Problem List Items Addressed This Visit        Unprioritized    Multiple sclerosis    Relevant Medications    gabapentin (NEURONTIN) 100 MG capsule    Other Relevant Orders    MRI Brain Demyelinating W W/O Contrast    CBC auto differential    Hepatitis B Core Antibody, Total    Hepatitis B Surface Ab, Qualitative    Hepatitis B Surface Antigen    Rituxan Sensitivity    Immunosuppression    Fatigue      Other Visit Diagnoses     JOHN (obstructive sleep apnea)    -  Primary    Relevant Orders    Polysomnogram (CPAP will be added if patient meets diagnostic criteria.)    Counseling regarding goals of care              Nel Campos MD

## 2020-08-20 ENCOUNTER — TELEPHONE (OUTPATIENT)
Dept: INTERNAL MEDICINE | Facility: CLINIC | Age: 35
End: 2020-08-20

## 2020-08-27 ENCOUNTER — TELEPHONE (OUTPATIENT)
Dept: SLEEP MEDICINE | Facility: CLINIC | Age: 35
End: 2020-08-27

## 2020-08-27 ENCOUNTER — OFFICE VISIT (OUTPATIENT)
Dept: SLEEP MEDICINE | Facility: CLINIC | Age: 35
End: 2020-08-27
Payer: COMMERCIAL

## 2020-08-27 DIAGNOSIS — G35 MULTIPLE SCLEROSIS: ICD-10-CM

## 2020-08-27 DIAGNOSIS — R06.83 SNORING: ICD-10-CM

## 2020-08-27 DIAGNOSIS — R35.1 NOCTURIA: Primary | ICD-10-CM

## 2020-08-27 PROCEDURE — 99203 PR OFFICE/OUTPT VISIT, NEW, LEVL III, 30-44 MIN: ICD-10-PCS | Mod: 95,,, | Performed by: INTERNAL MEDICINE

## 2020-08-27 PROCEDURE — 99203 OFFICE O/P NEW LOW 30 MIN: CPT | Mod: 95,,, | Performed by: INTERNAL MEDICINE

## 2020-08-27 NOTE — LETTER
August 27, 2020      Nel Campos MD  [address]  [facility] [address]           [id number]  [address]  [facility] [address]  Phone: [phone]          Patient: Sandra France   MR Number: [id number]   YOB: 1985   Date of Visit: 8/27/2020       Dear Dr. Nel Campos:    Thank you for referring Sandra France to me for evaluation. Attached you will find relevant portions of my assessment and plan of care.    If you have questions, please do not hesitate to call me. I look forward to following Sandra France along with you.    Sincerely,    Marielos Siegel MD    Enclosure  CC:  No Recipients    If you would like to receive this communication electronically, please contact [email or web address] or [phone] to request more information on [employer] Link access.    For providers and/or their staff who would like to refer a patient to [facility], please contact us through our one-stop-shop provider referral line, [facility], at [phone].    If you feel you have received this communication in error or would no longer like to receive these types of communications, please e-mail [email or web address]

## 2020-08-27 NOTE — PROGRESS NOTES
"Subjective:       Patient ID: Sandra France is a 34 y.o. female.    Chief Complaint: Sleeping Problem    HPI     I had the pleasure of seeing Sandra France today, who is a 34 y.o. female that presents with frequent awakenings.    Sandra France does not have a CDL.    Sandra France is not a shift worker.    Sandra France presents with has interrupted sleep and is not rested upon awakening that has been going on for "always"     Bedtime when working ranges from 2130 to 2200.   When not working, bedtime ranges from 2130 to 2300.   Sleep latency ranges from 10 to 20 minutes.     Average number of awakenings is 1-2 and return to sleep is quick.   Wake up time when working is 0600 to 0700.   When not working, wake up time is 0600 to 0700.   Patient does not feel rested upon awakening.    Sandra France consumes approximately 1 beverages with caffeine daily.   An average of 1 beverages with alcohol are consumed monthly   Medications taken for sleep currently: none  Previous medications taken: melatonin     Sandra France does experience daytime sleepiness.   Naps are taken about 1 time weekly, usually lasting 60 to 90 minutes.  Sandra currently does operate an automobile.  Sandra France does not experience drowsiness when driving.   Patient does doze off when sedentary.   Sandra France does not have auxiliary symptoms of narcolepsy including sleep onset paralysis, hypnagogic hallucinations, sleep attacks and cataplexy  ESS 6.    Sandra France has a history of snoring.   Snoring is described as unknown.   Apneic episodes have not been noticed during sleep.   A witness to sleep is present.   The patient awakens with mouth dryness.      Sandra France does not have symptoms of Restless Legs Syndrome. Nocturnal leg movements have not been noticed.   The patient does not experience sleep related leg cramps.   There is not a history of " parasomnia.      Current Outpatient Medications:     buPROPion (WELLBUTRIN XL) 300 MG 24 hr tablet, Take 1 tablet (300 mg total) by mouth once daily., Disp: 90 tablet, Rfl: 1    coenzyme Q10 10 mg capsule, Take 500 mg by mouth once daily., Disp: , Rfl:     drospirenone, contraceptive, (SLYND) 4 mg (28) Tab, Take 4 mg by mouth once daily., Disp: , Rfl:     ergocalciferol, vitamin D2, (VITAMIN D ORAL), Take 5,000 Units by mouth once daily. , Disp: , Rfl:     escitalopram oxalate (LEXAPRO) 10 MG tablet, Take 10 mg by mouth once daily., Disp: , Rfl: 5    FERROUS SULFATE ORAL, Take 1 tablet by mouth once daily. , Disp: , Rfl:     fesoterodine (TOVIAZ) 4 mg Tb24, Take 1 tablet (4 mg total) by mouth once daily., Disp: 30 tablet, Rfl: 11    gabapentin (NEURONTIN) 100 MG capsule, Take 1 capsule (100 mg total) by mouth every evening., Disp: , Rfl:     ibuprofen (ADVIL,MOTRIN) 200 MG tablet, Take 800 mg by mouth daily as needed for Pain., Disp: , Rfl:     levothyroxine (SYNTHROID) 75 MCG tablet, Take 75 mcg by mouth before breakfast. , Disp: , Rfl:     liothyronine (CYTOMEL) 5 MCG Tab, Take 10 mcg by mouth once daily. , Disp: , Rfl:     lisinopril 10 MG tablet, Take 10 mg by mouth once daily., Disp: , Rfl:     magnesium glycinate 100 mg Tab, Take 400 mg by mouth once., Disp: , Rfl:     melatonin 5 mg Chew, Take 5 mg by mouth nightly., Disp: , Rfl:     omeprazole/sodium bicarbonate (ZEGERID OTC ORAL), Take 1 tablet by mouth 2 (two) times daily as needed. , Disp: , Rfl:     topiramate (TOPAMAX) 100 MG tablet, Take 1 tablet (100 mg total) by mouth 2 (two) times daily., Disp: 60 tablet, Rfl: 5     Review of patient's allergies indicates:  No Known Allergies      Past Medical History:   Diagnosis Date    Anemia     Anxiety     Anxiety     Back pain     Bleeds easily     Change in bowel habit     Changes in skin texture     Confusion     Depression     Depression     Dizziness     Dry skin     Fatigue      Has daytime drowsiness     Headache     Heartburn     History of weakness of extremity     HTN (hypertension)     Hx of psychiatric care     Hyperthyroidism     Insomnia     Lethargy     Loose stools     Memory loss     Migraine     MS (multiple sclerosis)     Neck pain     Neuropathy     Numbness of extremity     Optic neuritis     PCOS (polycystic ovarian syndrome)     Psychiatric problem     Rash     Restless sleeper     Stomach pain     Therapy     Thyroid disease     Thyroid disease     Tingling in extremities     Tiredness     Trouble walking     Unsteady gait     Urine troubles     Voiding dysfunction     Wheezing     Worries        History reviewed. No pertinent surgical history.    Family History   Problem Relation Age of Onset    Depression Mother     Hyperthyroidism Mother     Hypertension Father        Social History     Socioeconomic History    Marital status:      Spouse name: Not on file    Number of children: Not on file    Years of education: Not on file    Highest education level: Not on file   Occupational History    Not on file   Social Needs    Financial resource strain: Not on file    Food insecurity     Worry: Not on file     Inability: Not on file    Transportation needs     Medical: Not on file     Non-medical: Not on file   Tobacco Use    Smoking status: Former Smoker     Packs/day: 0.25     Years: 12.00     Pack years: 3.00     Types: Cigarettes     Start date: 8/17/2020    Smokeless tobacco: Never Used   Substance and Sexual Activity    Alcohol use: No    Drug use: Never    Sexual activity: Yes   Lifestyle    Physical activity     Days per week: Not on file     Minutes per session: Not on file    Stress: Not on file   Relationships    Social connections     Talks on phone: Not on file     Gets together: Not on file     Attends Faith service: Not on file     Active member of club or organization: Not on file     Attends  meetings of clubs or organizations: Not on file     Relationship status: Not on file   Other Topics Concern    Patient feels they ought to cut down on drinking/drug use Not Asked    Patient annoyed by others criticizing their drinking/drug use Not Asked    Patient has felt bad or guilty about drinking/drug use Not Asked    Patient has had a drink/used drugs as an eye opener in the AM Not Asked   Social History Narrative    Not on file           Old medical records.    There were no vitals filed for this visit.           The patient was given open opportunity to ask questions and/or express concerns about treatment plan.   All questions/concerns were discussed.   Driving precautions were provided.     Two patient identifiers used prior to evaluation.    Thank you for referring Sandra France for evaluation.           Review of Systems      Objective:      Physical Exam    Assessment:       1. Nocturia    2. Chronic migraine    3. Multiple sclerosis    4. Snoring    5. Sleep-related hypoventilation        Plan:       Due to listed symptoms, a polysomnogram is recommended and ordered.   Description of procedure given to patient.   If significant Obstructive Sleep Apnea (JOHN) is found during the initial portion of the study, therapy will be initiated with nasal Continuous Positive Airway Pressure (CPAP).   Goals of therapy were discussed, alternative treatments listed and patient agrees to this form of therapy if indicated.   The pathophysiology of JOHN was discussed.   The effects of JOHN on patient's co-morbid conditions and the increased morbidity and/or mortality associated with this condition were reviewed.   The patient was given open opportunity to ask questions and/or express concerns about treatment plan.   All questions/concerns were discussed.   Driving precautions were provided.   End tidal C02    Thank you for referring Sandra France for evaluation.       The patient location is: office  The  chief complaint leading to consultation is: MS and fatigue    Visit type: audiovisual    Face to Face time with patient: 16  31 minutes of total time spent on the encounter, which includes face to face time and non-face to face time preparing to see the patient (eg, review of tests), Obtaining and/or reviewing separately obtained history, Documenting clinical information in the electronic or other health record, Independently interpreting results (not separately reported) and communicating results to the patient/family/caregiver, or Care coordination (not separately reported).         Each patient to whom he or she provides medical services by telemedicine is:  (1) informed of the relationship between the physician and patient and the respective role of any other health care provider with respect to management of the patient; and (2) notified that he or she may decline to receive medical services by telemedicine and may withdraw from such care at any time.    Notes:

## 2020-09-02 ENCOUNTER — OFFICE VISIT (OUTPATIENT)
Dept: NEUROLOGY | Facility: CLINIC | Age: 35
End: 2020-09-02
Payer: COMMERCIAL

## 2020-09-02 VITALS
RESPIRATION RATE: 18 BRPM | DIASTOLIC BLOOD PRESSURE: 84 MMHG | WEIGHT: 271.19 LBS | HEIGHT: 59 IN | BODY MASS INDEX: 54.67 KG/M2 | SYSTOLIC BLOOD PRESSURE: 112 MMHG | HEART RATE: 86 BPM | TEMPERATURE: 97 F

## 2020-09-02 DIAGNOSIS — M62.89 PELVIC FLOOR DYSFUNCTION: ICD-10-CM

## 2020-09-02 DIAGNOSIS — G35 MULTIPLE SCLEROSIS: ICD-10-CM

## 2020-09-02 DIAGNOSIS — H46.9 OPTIC NEURITIS: ICD-10-CM

## 2020-09-02 DIAGNOSIS — F41.8 DEPRESSION WITH ANXIETY: ICD-10-CM

## 2020-09-02 DIAGNOSIS — Z72.0 TOBACCO USE: ICD-10-CM

## 2020-09-02 PROCEDURE — 99999 PR PBB SHADOW E&M-EST. PATIENT-LVL V: ICD-10-PCS | Mod: PBBFAC,,, | Performed by: NURSE PRACTITIONER

## 2020-09-02 PROCEDURE — 3074F PR MOST RECENT SYSTOLIC BLOOD PRESSURE < 130 MM HG: ICD-10-PCS | Mod: CPTII,S$GLB,, | Performed by: NURSE PRACTITIONER

## 2020-09-02 PROCEDURE — 3008F PR BODY MASS INDEX (BMI) DOCUMENTED: ICD-10-PCS | Mod: CPTII,S$GLB,, | Performed by: NURSE PRACTITIONER

## 2020-09-02 PROCEDURE — 99999 PR PBB SHADOW E&M-EST. PATIENT-LVL V: CPT | Mod: PBBFAC,,, | Performed by: NURSE PRACTITIONER

## 2020-09-02 PROCEDURE — 3079F DIAST BP 80-89 MM HG: CPT | Mod: CPTII,S$GLB,, | Performed by: NURSE PRACTITIONER

## 2020-09-02 PROCEDURE — 3079F PR MOST RECENT DIASTOLIC BLOOD PRESSURE 80-89 MM HG: ICD-10-PCS | Mod: CPTII,S$GLB,, | Performed by: NURSE PRACTITIONER

## 2020-09-02 PROCEDURE — 3008F BODY MASS INDEX DOCD: CPT | Mod: CPTII,S$GLB,, | Performed by: NURSE PRACTITIONER

## 2020-09-02 PROCEDURE — 99214 OFFICE O/P EST MOD 30 MIN: CPT | Mod: S$GLB,,, | Performed by: NURSE PRACTITIONER

## 2020-09-02 PROCEDURE — 3074F SYST BP LT 130 MM HG: CPT | Mod: CPTII,S$GLB,, | Performed by: NURSE PRACTITIONER

## 2020-09-02 PROCEDURE — 99214 PR OFFICE/OUTPT VISIT, EST, LEVL IV, 30-39 MIN: ICD-10-PCS | Mod: S$GLB,,, | Performed by: NURSE PRACTITIONER

## 2020-09-02 RX ORDER — TOPIRAMATE 100 MG/1
100 TABLET, FILM COATED ORAL 2 TIMES DAILY
Qty: 60 TABLET | Refills: 5 | Status: SHIPPED | OUTPATIENT
Start: 2020-09-02 | End: 2021-03-30 | Stop reason: SDUPTHER

## 2020-09-02 NOTE — PROGRESS NOTES
HPI: Sandra France is a 34 y.o. female with MS, chronic migraine, and anxiety. She has hypothyroidism. She is a manager at a veterinary office.     She presents today for a follow up visit. She began to feel severe fatigue with brain fog in 4/2020. She had repeat MRI's with [facility] Specialists [place] [facility] [place] which showed new lesions. She was given Decadron x 5 days, then switched from Copazone to Ocrevus in June.     Topamax was increased at her last visit, which reduced her headaches from daily to 1x per week, which are mild, and do not require abortive therapy.     Sinus issues are improved.     She is planning to have a PSG to evaluate for JOHN, given her severe fatigue.     She has ongoing thrombocytosis and leukocytosis. She was referred to Hematology by her PCP. She saw Dr. Elio Dozier at [facility], who informed her that the changes were likely from inflammatory factors, but did repeat more labwork, and she plans to follow back up with him.     She is followed by Psychiatry. Mood is unchanged-ongoing worry, but no overt anxiety or depression.     Review of Systems   Constitutional: Negative for fever.   HENT: Negative for nosebleeds.    Eyes: Negative for double vision.   Respiratory: Negative for hemoptysis.    Cardiovascular: Negative for leg swelling.   Gastrointestinal: Negative for blood in stool.   Genitourinary: Negative for hematuria.   Musculoskeletal: Negative for falls.   Skin: Negative for rash.   Neurological: Positive for headaches. Negative for focal weakness.   Psychiatric/Behavioral: The patient is nervous/anxious and has insomnia.        I have reviewed all of this patient's past medical and surgical histories as well as family and social histories and active allergies and medications as documented in the electronic medical record.    Exam:  Gen Appearance, well developed/nourished in no apparent distress  CV: 2+ distal pulses with no edema or swelling  Neuro:  MS: Awake, alert,  oriented to place, person, time, situation. Sustains attention. Recent/remote memory intact, Language is full to spontaneous speech/comprehension. Fund of Knowledge is full  CN: Optic discs are flat with normal vasculature, PERRL, Extraoccular movements and visual fields are full. Normal facial sensation and strength, Hearing symmetric, Tongue and Palate are midline and strong. Shoulder Shrug symmetric and strong.  Motor: Normal bulk, tone, no abnormal movements. 5/5 strength bilateral upper/lower extremities with 2+ reflexes and no clonus  Sensory: symmetric to light touch, pain, temp, and vibration,  Romberg negative  Cerebellar: Finger-nose,Heal-shin, Rapid alternating movements intact  Gait: Normal stance, no ataxia    Imaging:   MRI Brain 5/2020:    Interval development of scattered small to punctate size new T2 FLAIR hyperintense lesions with corresponding enhancement throughout the brain parenchyma as detailed above.  In light of history and prior findings most compatible with active areas of demyelination with largest focus of enhancement measuring 9 mm in the right posterior temporal deep white matter.  Clinical correlation and follow-up recommended.    7/2019 MRI Brain:   1. Multiple areas of abnormal signal alteration involving the periventricular white matter best demonstrated on T2/FLAIR imaging compatible with patient's history of multiple sclerosis.  2. New 12 mm lesion within the right parietal lobe with increased signal intensity on diffusion-weighted imaging not present on the previous study from July 24, 2018.    7/24/18 MRI Brain: Multiple areas of increased white matter signal in a pattern suggestive of multiple sclerosis, measuring up to 1 cm in the right frontal periventricular region.  Nothing enhancing    7/24/18 MRI C spine: 1. No evidence demyelinating process within the cervical cord.  2. Mild disc bulges as above from C3-6, greatest at C5-6.    7/24/18 MRI T spine: The thoracic spine  demonstrates a normal kyphosis.  There is disc desiccation and loss of disc height from T6-9.  There is a left paracentral disc herniation at T8-9 with contact and mild deformity of the left paracentral cord.  No other focal disc protrusion is noted.  No abnormal cord signal is seen.  No abnormal enhancement is seen within the thoracic cord.  Vertebral body marrow signal is unremarkable.     Labs:   8/2018 Lyme titers, FRENCH, hep panel, HiV, RPRP Ace-level, SSA/SSB antibodies, ESR, and paraneoplastic panel, CMP, CBC unremarkable, Vit D 39, CBC 2018:  white blood cell count is mildly elevated, the platelet count is still elevated, and there is mild anemia.   CMP normal 10/2018  Vit D normal 10/2018  7/2019 CBC-elevated WBCs and increased platelets.     Assessment/Plan: Sandra France is a 34 y.o. female diagnosed with optic neuritis in her right eye in 6/2018 by OCT. Subsequent MRI brain 7/2018 shows multiple chronic demyelinating plaques concerning for Multiple Sclerosis. She has noted some left arm and leg numbness for years, dizziness, fatigue, mood disorder, and urinary symptoms. Imaging and cluster of symptoms are consistent with relapsing and remitting MS given her lesions and symptoms   by time and space    I recommend:   1. She failed Aimovig, Botox, and Zonegran.   -TCA's cannot be used given her concurrent use of two other antidepressant medications and this may worsen her obesity.   -Continue Topamax to 100 mg bid for headache prevention.   -She took this prior for mood and weight loss.   2. She is seeing Hematology at [facility] for her leukocytosis and thrombocytosis, which is believed to be related to inflammatory causes thus far.   3. Advised to continue to see Psychiatry for ongoing anxiety. Continue prn Melatonin prn for insomnia. Would avoid TCA's in this patient, given her concurrent use of Wellbutrin and Lexapro and her obesity.   4. She is now followed by Dr. Campos for her MS exclusively.  New lesions found on MRI Brain in 5/2020 after various complaints, and her Copaxone was changed to Ocrevus.   -Previously, she was not given IV steroids at the time of acute ON, but symptoms improved. Labs for MS mimics were normal.   -LP was not done as her symptoms are rather pathognomonic.   5. Continue Vit Supplementation for Vit D deficiency  6. Urology consulted for nocturia. She is taking medication for this and started PT.   7. Smoking cessation is advised to prevent CVA. She has multiple risk factors for CVA including tobacco use, HTN, and birth control use. Birth control pills should not be taken with tobacco use. Discussed elevated platelets and increased risk of thrombotic events with this. Note: she is taking Wellbutrin without effect on her smoking status, though this is taken for anxiety and depression.   8. She is seeing [facility] PT for pelvic floor therapy. She was referred for this per Urology prior, but had not been able to find a local provider at that time.      RTC 6 months

## 2020-09-22 ENCOUNTER — PATIENT MESSAGE (OUTPATIENT)
Dept: NEUROLOGY | Facility: CLINIC | Age: 35
End: 2020-09-22

## 2020-10-02 ENCOUNTER — HOSPITAL ENCOUNTER (OUTPATIENT)
Dept: RADIOLOGY | Facility: HOSPITAL | Age: 35
Discharge: HOME | End: 2020-10-02
Attending: PSYCHIATRY & NEUROLOGY
Payer: COMMERCIAL

## 2020-10-02 ENCOUNTER — OFFICE VISIT (OUTPATIENT)
Dept: NEUROLOGY | Facility: CLINIC | Age: 35
End: 2020-10-02
Payer: COMMERCIAL

## 2020-10-02 ENCOUNTER — TELEPHONE (OUTPATIENT)
Dept: SLEEP MEDICINE | Facility: CLINIC | Age: 35
End: 2020-10-02

## 2020-10-02 VITALS
SYSTOLIC BLOOD PRESSURE: 112 MMHG | WEIGHT: 275.13 LBS | TEMPERATURE: 98 F | HEIGHT: 59 IN | BODY MASS INDEX: 55.47 KG/M2 | DIASTOLIC BLOOD PRESSURE: 84 MMHG | HEART RATE: 86 BPM

## 2020-10-02 DIAGNOSIS — Z71.89 COUNSELING REGARDING GOALS OF CARE: ICD-10-CM

## 2020-10-02 DIAGNOSIS — R35.1 NOCTURIA: ICD-10-CM

## 2020-10-02 DIAGNOSIS — G35 MULTIPLE SCLEROSIS: Primary | ICD-10-CM

## 2020-10-02 DIAGNOSIS — R06.83 SNORING: ICD-10-CM

## 2020-10-02 DIAGNOSIS — Z79.899 HIGH RISK MEDICATION USE: ICD-10-CM

## 2020-10-02 DIAGNOSIS — Z29.89 PROPHYLACTIC IMMUNOTHERAPY: ICD-10-CM

## 2020-10-02 DIAGNOSIS — R53.83 FATIGUE, UNSPECIFIED TYPE: ICD-10-CM

## 2020-10-02 DIAGNOSIS — G35 MULTIPLE SCLEROSIS: ICD-10-CM

## 2020-10-02 PROCEDURE — 70553 MRI BRAIN STEM W/O & W/DYE: CPT | Mod: TC

## 2020-10-02 PROCEDURE — 99999 PR PBB SHADOW E&M-EST. PATIENT-LVL III: CPT | Mod: PBBFAC,,, | Performed by: CLINICAL NURSE SPECIALIST

## 2020-10-02 PROCEDURE — 3074F SYST BP LT 130 MM HG: CPT | Mod: CPTII,S$GLB,, | Performed by: CLINICAL NURSE SPECIALIST

## 2020-10-02 PROCEDURE — 3079F DIAST BP 80-89 MM HG: CPT | Mod: CPTII,S$GLB,, | Performed by: CLINICAL NURSE SPECIALIST

## 2020-10-02 PROCEDURE — A9585 GADOBUTROL INJECTION: HCPCS | Performed by: PSYCHIATRY & NEUROLOGY

## 2020-10-02 PROCEDURE — 70553 MRI BRAIN DEMYELINATING W/ WO CONTRAST: ICD-10-PCS | Mod: 26,,, | Performed by: RADIOLOGY

## 2020-10-02 PROCEDURE — 3079F PR MOST RECENT DIASTOLIC BLOOD PRESSURE 80-89 MM HG: ICD-10-PCS | Mod: CPTII,S$GLB,, | Performed by: CLINICAL NURSE SPECIALIST

## 2020-10-02 PROCEDURE — 99215 OFFICE O/P EST HI 40 MIN: CPT | Mod: S$GLB,,, | Performed by: CLINICAL NURSE SPECIALIST

## 2020-10-02 PROCEDURE — 3074F PR MOST RECENT SYSTOLIC BLOOD PRESSURE < 130 MM HG: ICD-10-PCS | Mod: CPTII,S$GLB,, | Performed by: CLINICAL NURSE SPECIALIST

## 2020-10-02 PROCEDURE — 70553 MRI BRAIN STEM W/O & W/DYE: CPT | Mod: 26,,, | Performed by: RADIOLOGY

## 2020-10-02 PROCEDURE — 25500020 PHARM REV CODE 255: Performed by: PSYCHIATRY & NEUROLOGY

## 2020-10-02 PROCEDURE — 99215 PR OFFICE/OUTPT VISIT, EST, LEVL V, 40-54 MIN: ICD-10-PCS | Mod: S$GLB,,, | Performed by: CLINICAL NURSE SPECIALIST

## 2020-10-02 PROCEDURE — 99999 PR PBB SHADOW E&M-EST. PATIENT-LVL III: ICD-10-PCS | Mod: PBBFAC,,, | Performed by: CLINICAL NURSE SPECIALIST

## 2020-10-02 PROCEDURE — 3008F PR BODY MASS INDEX (BMI) DOCUMENTED: ICD-10-PCS | Mod: CPTII,S$GLB,, | Performed by: CLINICAL NURSE SPECIALIST

## 2020-10-02 PROCEDURE — 3008F BODY MASS INDEX DOCD: CPT | Mod: CPTII,S$GLB,, | Performed by: CLINICAL NURSE SPECIALIST

## 2020-10-02 RX ORDER — GADOBUTROL 604.72 MG/ML
10 INJECTION INTRAVENOUS
Status: COMPLETED | OUTPATIENT
Start: 2020-10-02 | End: 2020-10-02

## 2020-10-02 RX ADMIN — GADOBUTROL 10 ML: 604.72 INJECTION INTRAVENOUS at 10:10

## 2020-10-02 NOTE — TELEPHONE ENCOUNTER
----- Message from Racheal Malloy sent at 10/2/2020  4:03 PM CDT -----  Regarding: Patient Call Back  Who Called:NEELIMA ORNELAS [[id number]]    What is the request in detail:call back in regards to sleep study states she called to schedule her sleep study and she was told she was needing to schedule a visit with Dr. Siegel before scheduling sleep study due to a new covid-19 rule    Can the clinic reply by  [facility]? Yes    Best Call Back Number:[phone]

## 2020-10-02 NOTE — TELEPHONE ENCOUNTER
Pt was told that she needed to set up an appt with Dr. Siegel before she is able to schedule her sleep study.

## 2020-10-02 NOTE — PROGRESS NOTES
Subjective:          Patient ID: Sandra France is a 34 y.o. female who presents today for a routine clinic visit for MS.  She was last seen by Dr. Campos in August 2020. The history has been provided by the patient.     MS HPI:  · DMT: ocrelizumab infused on 5/29/20 and 6/12/20; due next in November   · Side effects from DMT? No  · Taking vitamin D3 as recommended? Yes   · Fatigue is still a significant problem. She is going to follow back up with her PCP for thyroid testing. . She has not had a sleep study yet. She is taking Co-Q-10. She also takes B12. She is not exercising.   · She denies any other worsened MS symptoms.       Medications:  Current Outpatient Medications   Medication Sig    buPROPion (WELLBUTRIN XL) 300 MG 24 hr tablet Take 1 tablet (300 mg total) by mouth once daily.    coenzyme Q10 10 mg capsule Take 500 mg by mouth once daily.    drospirenone, contraceptive, (SLYND) 4 mg (28) Tab Take 4 mg by mouth once daily.    ergocalciferol, vitamin D2, (VITAMIN D ORAL) Take 5,000 Units by mouth once daily.     escitalopram oxalate (LEXAPRO) 10 MG tablet Take 10 mg by mouth once daily.    FERROUS SULFATE ORAL Take 1 tablet by mouth once daily.     fesoterodine (TOVIAZ) 4 mg Tb24 Take 1 tablet (4 mg total) by mouth once daily.    gabapentin (NEURONTIN) 100 MG capsule Take 1 capsule (100 mg total) by mouth every evening.    ibuprofen (ADVIL,MOTRIN) 200 MG tablet Take 800 mg by mouth daily as needed for Pain.    levothyroxine (SYNTHROID) 75 MCG tablet Take 75 mcg by mouth before breakfast.     liothyronine (CYTOMEL) 5 MCG Tab Take 10 mcg by mouth once daily.     lisinopril 10 MG tablet Take 10 mg by mouth once daily.    magnesium glycinate 100 mg Tab Take 400 mg by mouth once daily.     melatonin 5 mg Chew Take 5 mg by mouth nightly.    omeprazole/sodium bicarbonate (ZEGERID OTC ORAL) Take 1 tablet by mouth 2 (two) times daily as needed.     topiramate (TOPAMAX) 100 MG tablet Take 1  tablet (100 mg total) by mouth 2 (two) times daily.       SOCIAL HISTORY  Social History     Tobacco Use    Smoking status: Former Smoker     Packs/day: 0.25     Years: 12.00     Pack years: 3.00     Types: Cigarettes     Start date: 8/17/2020    Smokeless tobacco: Never Used   Substance Use Topics    Alcohol use: No    Drug use: Never       Living arrangements - the patient lives with her family    ROS:  REVIEW OF SYMPTOMS 10/1/2020   Do you feel abnormally tired on most days? Yes   Do you feel you generally sleep well? No   Do you have difficulty controlling your bladder?  Yes--has not been back to pelvic floor therapy; on Toviaz, which was been helpful   Do you have difficulty controlling your bowels?  No   Do you have frequent muscle cramps, tightness or spasms in your limbs?  Yes--she will get random spasms in the left calf; has happened more frequently lately; might happen a few times a week and last 2 minutes. It usually gets better with movement. If she is sitting for long periods of time, it seems to happen more.    Do you have new visual symptoms?  No   Do you have worsening difficulty with your memory or thinking? No--stable; always has a little brain fog; job performance is good    Do you have worsening symptoms of anxiety or depression?  No   For patients who walk, Do you have more difficulty walking?  Not Applicable   Have you fallen since your last visit?  No   For patients who use wheelchairs: Do you have any skin wounds or breakdown? Not Applicable   Do you have difficulty using your hands?  No; sometimes feels like  is not as strong as it used to be    Do you have shooting or burning pain? No   Do you have difficulty with sexual function?  No   If you are sexually active, are you using birth control? Y/N  N/A Yes   Do you often choke when swallowing liquids or solid food?  No   Do you experience worsening symptoms when overheated? Yes; heat makes her feel tired and grouchy.    Do you need  any new equipment such as a wheelchair, walker or shower chair? No   Do you receive co-pay financial assistance for your principal MS medicine? No--met 100% OOP earlier this year    Would you be interested in participating in an MS research trial in the future? Yes   For patients on Gilenya, Tecfidera, Aubagio, Rituxan, Ocrevus, Tysabri, Lemtrada or Methotrexate, are you aware that you should NOT receive live virus vaccines?  Yes   Do you feel you have adequate family/friend support?  Yes   Do you have health insurance?   Yes   Are you currently employed? Yes   Do you receive SSDI/SSI?  Not Applicable   Do you use marijuana or cannabis products? No   Have you been diagnosed with a urinary tract infection since your last visit here? No   Have you been diagnosed with a respiratory tract infection since your last visit here? No   Have you been to the emergency room since your last visit here? No   Have you been hospitalized since your last visit here?  No     FSS SCORE & INTERPRETATION 10/1/2020   FSS SCORE  45   FSS SCORE INTERPRETATION May be suffering from fatigue     MS CANDIS-D SCORE & INTERPRETATION 10/1/2020   CANDIS-D SCORE  16   CANDIS-D INTERPRETATION  Mild to moderate Depression     MS TONY-7 SCORE & INTERPRETATION 10/1/2020   TONY-7 SCORE  7   TONY-7 SCORE INTERPRETATION Mild Anxiety     PEQ MS MOS PAIN EFFECTS SCORE & INTERPRETATION 10/1/2020   PES SCORE 9   PES SCORE INTERPRETATION Scores can range from 6-30.  Items are scaled so that higher scores indicate a greater impact of pain on a patients mood and behavior.     PEQ MS SEXUAL SATISFACTION SCORE & INTERPRETATION 10/1/2020   SSS SCORE  8   SSS SCORE INTERPRETATION Scores can range from 4-24.  Higher scores indicate greater problems with sexual satisfaction.     MS BLADDER CONTROL SCORE & INTERPRETATION 10/1/2020   BLCS SCORE 3   BLCS SCORE INTERPRETATION  Scores can range from 0-22, with higher scores indicating greater bladder control problems.     MS BOWEL  CONTROL SCORE & INTERPRETATION 10/2/2020   BWCS SCORE 3   BWCS SCORE INTERPRETATION Scores can range from 0-26, with higher scores indicating greater bowel control problems.     PEQ MS IMPACT OF VISUAL IMPAIRMENT SCORE & INTERPRETATION 10/1/2020   RAJ SCALE SCORE  0   RAJ SCORE INTERPRETATION Scores can range from 0-15, with higher scores indicating greater impact of visual problems on daily activites.     MS PDQ SCORE & INTERPRETATION 10/2/2020   PDQ RETROSPECTIVE MEMORY SUBSCALE 11   PDQ ATTENTION/CONCENTRATION SUBSCALE 12   PDQ PROSPECTIVE MEMORY SUBSCALE 7   PDQ PLANNING/ORGANIZATION SUBSCALE 11   PDQ TOTAL SCORE 41   PDQ SCORE INTERPRETATION Scores can range from 0-80, with higher scores indicating greater perceived cognitive impairment.     MSSS SCORE & INTERPRETATION 10/2/2020   MSSS TANGIBLE SUPPORT SUBSCALE 75   MSSS EMOTIONAL/INFORMATIONAL SUPPORT SUBSCALE 75   MSSS AFFECTIONATE SUPPORT SUBSCALE 83.33   MSSS POSITIVE SOCIAL INTERACTION SUBSCALE 83.33   MSSS TOTAL SCORE 79.17   MSSS SCORE INTERPRETATION Scores can range from 0-100, with higher scores indicating greater perceived support.              Objective:        1. 25 foot timed walk: 4.2 seconds today without assist   Timed 25 Foot Walk: 5/13/2020 8/19/2020   Did patient wear an AFO? No No   Was assistive device used? No No   Time for 25 Foot Walk (seconds) 5.1 4.16   Time for 25 Foot Walk (seconds) - 4.2       Neurologic Exam     Mental Status   Oriented to person, place, and time.   Attention: normal. Concentration: normal.   Speech: speech is normal   Level of consciousness: alert  Knowledge: good.   Normal comprehension.     Cranial Nerves     CN III, IV, VI   Extraocular motions are normal.   CN III: no CN III palsy  CN VI: no CN VI palsy  Nystagmus: none     CN V   Right facial sensation deficit: none  Left facial sensation deficit: none    CN VII   Right facial weakness: none  Left facial weakness: none    CN VIII   Hearing: intact    CN IX,  X   Palate: symmetric    CN XI   Right sternocleidomastoid strength: normal  Left sternocleidomastoid strength: normal  Right trapezius strength: normal  Left trapezius strength: normal    CN XII   Tongue deviation: none    Motor Exam   Muscle bulk: normal  Overall muscle tone: normal    Strength   Strength 5/5 throughout.   Right neck flexion: 5/5  Left neck flexion: 5/5  Right neck extension: 5/5  Left neck extension: 5/5  Right deltoid: 5/5  Left deltoid: 5/5  Right biceps: 5/5  Left biceps: 5/5  Right triceps: 5/5  Left triceps: 5/5  Right wrist flexion: 5/5  Left wrist flexion: 5/5  Right wrist extension: 5/5  Left wrist extension: 5/5  Right interossei: 5/5  Left interossei: 5/5  Right iliopsoas: 5/5  Left iliopsoas: 5/5  Right quadriceps: 5/5  Left quadriceps: 5/5  Right hamstrin/5  Left hamstrin/5  Right anterior tibial: 5/5  Left anterior tibial: 5/5  Right gastroc: 5/5  Left gastroc: 5/5    Sensory Exam   Right arm vibration: normal  Left arm vibration: normal  Right leg vibration: decreased from knee  Left leg vibration: decreased from knee    Mildly diminished vibratory sense in bilateral lower extremities (right worse than left)     Gait, Coordination, and Reflexes     Gait  Gait: normal    Coordination   Romberg: negative  Finger to nose coordination: normal  Heel to shin coordination: normal  Tandem walking coordination: normal    Tremor   Resting tremor: absent    Reflexes   Right brachioradialis: 2+  Left brachioradialis: 2+  Right biceps: 2+  Left biceps: 2+  Right triceps: 2+  Left triceps: 2+  Right patellar: 2+  Left patellar: 2+  Right achilles: 2+  Left achilles: 2+    Normal rapid sequential movements in upper and lower extremities         Imaging:     Results for orders placed during the hospital encounter of 19   MRI Brain Demyelinating Without Contrast    Impression 1. Multiple areas of abnormal signal alteration involving the periventricular white matter best demonstrated on  T2/FLAIR imaging compatible with patient's history of multiple sclerosis.  2. New 12 mm lesion within the right parietal lobe with increased signal intensity on diffusion-weighted imaging not present on the previous study from July 24, 2018.      Electronically signed by: Manny Blandon MD  Date:    07/31/2019  Time:    11:28     Results for orders placed during the hospital encounter of 10/02/20   MRI Brain Demyelinating W W/O Contrast    Impression Interval development of a new nonenhancing small T2 FLAIR hyperintense lesion right posterior body of the corpus callosum configuration concerning for interval demyelination in light of history.    There is resolution of previous enhancement associated with separate T2 FLAIR hyperintensities lesion seen on prior with otherwise stable T2 FLAIR lesion burden concerning for mild degree of prior demyelinating plaque burden in light of history.    No evidence for enhancing lesion or diffusion restriction to suggest active demyelination.    Clinical correlation and continued follow-up advised      Electronically signed by: Edward Elena DO  Date:    10/02/2020  Time:    11:34     Labs:     Lab Results   Component Value Date    JRXADAMW06QM 34 05/14/2020    AHZHYHRF00NF 60 09/30/2019    AOCSNVCB63GW 39 08/03/2018     Lab Results   Component Value Date    JCVINDEX 0.12 05/14/2020    JCVANTIBODY Negative 05/14/2020     Lab Results   Component Value Date    VI6ZBAUY 81.0 05/14/2020    ABSOLUTECD3 5009.0 (H) 05/14/2020    JL5OXYWU 21.9 05/14/2020    ABSOLUTECD8 1351.0 (H) 05/14/2020    PY4XUPHD 58.7 (H) 05/14/2020    ABSOLUTECD4 3629.0 (H) 05/14/2020    LABCD48 2.69 05/14/2020     Lab Results   Component Value Date    WBC 12.93 (H) 10/02/2020    RBC 4.64 10/02/2020    HGB 13.2 10/02/2020    HCT 41.6 10/02/2020    MCV 90 10/02/2020    MCH 28.4 10/02/2020    MCHC 31.7 (L) 10/02/2020    RDW 13.2 10/02/2020     (H) 10/02/2020    MPV 9.2 10/02/2020    GRAN 9.0 (H) 10/02/2020     GRAN 69.4 10/02/2020    LYMPH 2.5 10/02/2020    LYMPH 19.5 10/02/2020    MONO 1.0 10/02/2020    MONO 7.4 10/02/2020    EOS 0.4 10/02/2020    BASO 0.07 10/02/2020    EOSINOPHIL 2.7 10/02/2020    BASOPHIL 0.5 10/02/2020     Sodium   Date Value Ref Range Status   05/14/2020 137 136 - 145 mmol/L Final     Potassium   Date Value Ref Range Status   05/14/2020 3.7 3.5 - 5.1 mmol/L Final     Chloride   Date Value Ref Range Status   05/14/2020 104 95 - 110 mmol/L Final     CO2   Date Value Ref Range Status   05/14/2020 24 23 - 29 mmol/L Final     Glucose   Date Value Ref Range Status   05/14/2020 96 70 - 110 mg/dL Final     BUN, Bld   Date Value Ref Range Status   05/14/2020 24 (H) 6 - 20 mg/dL Final     Creatinine   Date Value Ref Range Status   05/14/2020 0.7 0.5 - 1.4 mg/dL Final     Calcium   Date Value Ref Range Status   05/14/2020 8.4 (L) 8.7 - 10.5 mg/dL Final     Total Protein   Date Value Ref Range Status   05/14/2020 6.2 6.0 - 8.4 g/dL Final     Albumin   Date Value Ref Range Status   05/14/2020 3.3 (L) 3.5 - 5.2 g/dL Final     Total Bilirubin   Date Value Ref Range Status   05/14/2020 0.8 0.1 - 1.0 mg/dL Final     Comment:     For infants and newborns, interpretation of results should be based  on gestational age, weight and in agreement with clinical  observations.  Premature Infant recommended reference ranges:  Up to 24 hours.............<8.0 mg/dL  Up to 48 hours............<12.0 mg/dL  3-5 days..................<15.0 mg/dL  6-29 days.................<15.0 mg/dL       Alkaline Phosphatase   Date Value Ref Range Status   05/14/2020 56 55 - 135 U/L Final     AST   Date Value Ref Range Status   05/14/2020 7 (L) 10 - 40 U/L Final     ALT   Date Value Ref Range Status   05/14/2020 20 10 - 44 U/L Final     Anion Gap   Date Value Ref Range Status   05/14/2020 9 8 - 16 mmol/L Final     eGFR if    Date Value Ref Range Status   05/14/2020 >60 >60 mL/min/1.73 m^2 Final     eGFR if non African American    Date Value Ref Range Status   05/14/2020 >60 >60 mL/min/1.73 m^2 Final     Comment:     Calculation used to obtain the estimated glomerular filtration  rate (eGFR) is the CKD-EPI equation.        Lab Results   Component Value Date    HEPBSAG Negative 10/02/2020    HEPBSAB Negative 10/02/2020    HEPBCAB Negative 10/02/2020           MS Impression and Plan:     NEURO MULTIPLE SCLEROSIS IMPRESSION:   MS Status:     Clinical Progression:  Clinically Stable    MRI Progression comment:  Enhancing lesions seen in May 2020 are no longer active; she has one small new lesion that likely developed prior to or right after starting Ocrevus.   Plan:     DMT:  No change in management    DMT comment:  Continue Ocrevus and Vitamin D. Her next infusion will be in late November. She had safety labs done today. is aware of the risks associated with immunosuppressant therapy, including increased risk of infection.     : Once she has sleep study, we can discuss fatigue management more (consider stimulant if she does not have sleep apnea).     I will see her back in February.   Encouraged initiation of an exercise program.      Our visit today lasted 40 minutes, and 100% of this time was spent face to face with the patient. Over 50% of this visit included discussion of the treatment plan/symptom management/exam findings/imaging results/coordination of care. The patient agrees with the plan of care.    NICHELLE Navarro, CNS

## 2020-10-04 ENCOUNTER — PATIENT MESSAGE (OUTPATIENT)
Dept: SLEEP MEDICINE | Facility: CLINIC | Age: 35
End: 2020-10-04

## 2020-10-05 ENCOUNTER — TELEPHONE (OUTPATIENT)
Dept: SLEEP MEDICINE | Facility: OTHER | Age: 35
End: 2020-10-05

## 2020-10-06 ENCOUNTER — PATIENT MESSAGE (OUTPATIENT)
Dept: NEUROLOGY | Facility: CLINIC | Age: 35
End: 2020-10-06

## 2020-10-13 ENCOUNTER — TELEPHONE (OUTPATIENT)
Dept: SLEEP MEDICINE | Facility: OTHER | Age: 35
End: 2020-10-13

## 2020-10-16 ENCOUNTER — TELEPHONE (OUTPATIENT)
Dept: SLEEP MEDICINE | Facility: OTHER | Age: 35
End: 2020-10-16

## 2020-10-17 ENCOUNTER — HOSPITAL ENCOUNTER (OUTPATIENT)
Dept: SLEEP MEDICINE | Facility: OTHER | Age: 35
Discharge: HOME | End: 2020-10-17
Attending: INTERNAL MEDICINE
Payer: COMMERCIAL

## 2020-10-17 DIAGNOSIS — R35.1 NOCTURIA: ICD-10-CM

## 2020-10-17 DIAGNOSIS — G47.33 OSA (OBSTRUCTIVE SLEEP APNEA): Primary | ICD-10-CM

## 2020-10-17 DIAGNOSIS — G35 MULTIPLE SCLEROSIS: ICD-10-CM

## 2020-10-17 DIAGNOSIS — R06.83 SNORING: ICD-10-CM

## 2020-10-17 PROCEDURE — 95810 POLYSOM 6/> YRS 4/> PARAM: CPT

## 2020-10-17 PROCEDURE — 95810 PR POLYSOMNOGRAPHY, 4 OR MORE: ICD-10-PCS | Mod: 26,,, | Performed by: INTERNAL MEDICINE

## 2020-10-17 PROCEDURE — 95810 POLYSOM 6/> YRS 4/> PARAM: CPT | Mod: 26,,, | Performed by: INTERNAL MEDICINE

## 2020-10-18 NOTE — PROGRESS NOTES
Ms. Sandra France came to [facility] on October 17, 2020 for sleep testing. Upon arrival she was shown to her room, paperwork given, procedures explained and questions answered.    The patient did not meet split criteria.  ETCO2 was monitored per protocol (38-46 mm Hg). SDB observed with desats to 81%.  Snoring considered mild.  EKG lead II NSR. Supine sleep captured.      Prior to leaving the lab the patient was given post study follow up instructions and questions answered.

## 2020-10-20 ENCOUNTER — PATIENT MESSAGE (OUTPATIENT)
Dept: NEUROLOGY | Facility: CLINIC | Age: 35
End: 2020-10-20

## 2020-10-20 ENCOUNTER — PATIENT MESSAGE (OUTPATIENT)
Dept: SLEEP MEDICINE | Facility: CLINIC | Age: 35
End: 2020-10-20

## 2020-10-20 NOTE — PROCEDURES
[id number]   Sex: Female Study Date: 10/17/2020   : 1985 Clinic #: [id number]   Age: 35 Referring Physician: Marielos Siegel MD   Height: 59.0 in Referring Physician #    Weight: 275.0 lbs Sleep Specialist: SRAVAN Siegel MD   B.M.I.: 55.5 Sleep Specialist #    Hypopnea rule: AASM 1A Scoring Tech: SHRUTHI Gamino   Total AHI: 29.9 Recording Tech: SEAMUS Jeffery   Lowest O2 sat: 81.0% Recording Location: [facility]     Sleep architecture: This is a baseline polysomnogram. At lights out, the patient fell asleep in 13.0 minutes and slept for 84.7% of the time. Total sleep time (TST) was 343.5 minutes. 30.4% of TST was in Stage N1 sleep, 17.6% TST in slow wave sleep, and 7.0% TST in REM sleep. The REM latency was 237.5 minutes.     Respiratory: Snoring was present. There was significant JOHN (obstructive sleep apnea) based on AHI (apnea hypopnea index) criteria. The overall AHI was 29.9 with an oxygen lalita of 81.0%.  The supine AHI was 27.6 and the REM AHI was 52.5.     Motor movement / Parasomnia: There were no significant limb movements of sleep noted.     Cardiac: Cardiac rhythm monitoring revealed a normal sinus rhythm.      IMPRESSION:  1. Moderate JOHN     RECOMMENDATION:  1. CPAP is recommended.  2. The patient has follow up with Sleep Medicine.

## 2020-10-21 DIAGNOSIS — G35 MULTIPLE SCLEROSIS: Primary | ICD-10-CM

## 2020-10-21 DIAGNOSIS — E66.9 OBESITY, UNSPECIFIED CLASSIFICATION, UNSPECIFIED OBESITY TYPE, UNSPECIFIED WHETHER SERIOUS COMORBIDITY PRESENT: ICD-10-CM

## 2020-10-21 DIAGNOSIS — E66.01 CLASS 3 SEVERE OBESITY WITH BODY MASS INDEX (BMI) OF 50.0 TO 59.9 IN ADULT, UNSPECIFIED OBESITY TYPE, UNSPECIFIED WHETHER SERIOUS COMORBIDITY PRESENT: ICD-10-CM

## 2020-10-22 ENCOUNTER — TELEPHONE (OUTPATIENT)
Dept: NEUROLOGY | Facility: CLINIC | Age: 35
End: 2020-10-22

## 2020-10-22 DIAGNOSIS — G35 MULTIPLE SCLEROSIS: ICD-10-CM

## 2020-10-22 DIAGNOSIS — Z13.9 SCREENING FOR CONDITION: Primary | ICD-10-CM

## 2020-10-23 NOTE — TELEPHONE ENCOUNTER
Reviewed labs from 10/2/20:  CD19, 20=0  Hep B Surface Ag negative  Hep B Surface Ab negative   Hep B Core Ab negative   WBC=12.93 (high)  EVG=3809  ANC elevated   This is stable. She is high at baseline.

## 2020-10-29 NOTE — TELEPHONE ENCOUNTER
Pt scheduled for repeat cbc on 11/13, and Ocrevus on 11/20. Pt states she does not smoke daily, but does smoke occasionally.

## 2020-11-02 ENCOUNTER — PATIENT MESSAGE (OUTPATIENT)
Dept: PSYCHIATRY | Facility: CLINIC | Age: 35
End: 2020-11-02

## 2020-11-09 ENCOUNTER — PATIENT MESSAGE (OUTPATIENT)
Dept: NEUROLOGY | Facility: CLINIC | Age: 35
End: 2020-11-09

## 2020-11-12 ENCOUNTER — PATIENT MESSAGE (OUTPATIENT)
Dept: NEUROLOGY | Facility: CLINIC | Age: 35
End: 2020-11-12

## 2020-11-16 ENCOUNTER — LAB VISIT (OUTPATIENT)
Dept: LAB | Facility: HOSPITAL | Age: 35
End: 2020-11-16
Attending: CLINICAL NURSE SPECIALIST
Payer: COMMERCIAL

## 2020-11-16 DIAGNOSIS — G35 MULTIPLE SCLEROSIS: ICD-10-CM

## 2020-11-16 LAB
BASOPHILS # BLD AUTO: 0.08 K/UL (ref 0–0.2)
BASOPHILS NFR BLD: 0.6 % (ref 0–1.9)
DIFFERENTIAL METHOD: ABNORMAL
EOSINOPHIL # BLD AUTO: 0.3 K/UL (ref 0–0.5)
EOSINOPHIL NFR BLD: 2 % (ref 0–8)
ERYTHROCYTE [DISTWIDTH] IN BLOOD BY AUTOMATED COUNT: 13.2 % (ref 11.5–14.5)
HCT VFR BLD AUTO: 40 % (ref 37–48.5)
HGB BLD-MCNC: 12.8 G/DL (ref 12–16)
IMM GRANULOCYTES # BLD AUTO: 0.07 K/UL (ref 0–0.04)
IMM GRANULOCYTES NFR BLD AUTO: 0.5 % (ref 0–0.5)
LYMPHOCYTES # BLD AUTO: 3.2 K/UL (ref 1–4.8)
LYMPHOCYTES NFR BLD: 21.9 % (ref 18–48)
MCH RBC QN AUTO: 28.4 PG (ref 27–31)
MCHC RBC AUTO-ENTMCNC: 32 G/DL (ref 32–36)
MCV RBC AUTO: 89 FL (ref 82–98)
MONOCYTES # BLD AUTO: 0.8 K/UL (ref 0.3–1)
MONOCYTES NFR BLD: 5.3 % (ref 4–15)
NEUTROPHILS # BLD AUTO: 10.1 K/UL (ref 1.8–7.7)
NEUTROPHILS NFR BLD: 69.7 % (ref 38–73)
NRBC BLD-RTO: 0 /100 WBC
PLATELET # BLD AUTO: 433 K/UL (ref 150–350)
PMV BLD AUTO: 8.8 FL (ref 9.2–12.9)
RBC # BLD AUTO: 4.5 M/UL (ref 4–5.4)
WBC # BLD AUTO: 14.52 K/UL (ref 3.9–12.7)

## 2020-11-16 PROCEDURE — 36415 COLL VENOUS BLD VENIPUNCTURE: CPT

## 2020-11-16 PROCEDURE — 85025 COMPLETE CBC W/AUTO DIFF WBC: CPT

## 2020-11-17 ENCOUNTER — CLINICAL SUPPORT (OUTPATIENT)
Dept: URGENT CARE | Facility: CLINIC | Age: 35
End: 2020-11-17
Payer: COMMERCIAL

## 2020-11-17 DIAGNOSIS — Z13.9 SCREENING FOR CONDITION: ICD-10-CM

## 2020-11-17 PROCEDURE — U0003 INFECTIOUS AGENT DETECTION BY NUCLEIC ACID (DNA OR RNA); SEVERE ACUTE RESPIRATORY SYNDROME CORONAVIRUS 2 (SARS-COV-2) (CORONAVIRUS DISEASE [COVID-19]), AMPLIFIED PROBE TECHNIQUE, MAKING USE OF HIGH THROUGHPUT TECHNOLOGIES AS DESCRIBED BY CMS-2020-01-R: HCPCS

## 2020-11-18 ENCOUNTER — PATIENT MESSAGE (OUTPATIENT)
Dept: NEUROLOGY | Facility: CLINIC | Age: 35
End: 2020-11-18

## 2020-11-19 LAB — SARS-COV-2 RNA RESP QL NAA+PROBE: NOT DETECTED

## 2020-11-20 ENCOUNTER — INFUSION (OUTPATIENT)
Dept: INFUSION THERAPY | Facility: HOSPITAL | Age: 35
End: 2020-11-20
Payer: COMMERCIAL

## 2020-11-20 VITALS
SYSTOLIC BLOOD PRESSURE: 126 MMHG | HEART RATE: 105 BPM | OXYGEN SATURATION: 97 % | RESPIRATION RATE: 20 BRPM | DIASTOLIC BLOOD PRESSURE: 72 MMHG | TEMPERATURE: 99 F

## 2020-11-20 DIAGNOSIS — G35 MULTIPLE SCLEROSIS: Primary | ICD-10-CM

## 2020-11-20 PROCEDURE — 63600175 PHARM REV CODE 636 W HCPCS: Performed by: PSYCHIATRY & NEUROLOGY

## 2020-11-20 PROCEDURE — 96413 CHEMO IV INFUSION 1 HR: CPT

## 2020-11-20 PROCEDURE — 96415 CHEMO IV INFUSION ADDL HR: CPT

## 2020-11-20 PROCEDURE — 96367 TX/PROPH/DG ADDL SEQ IV INF: CPT

## 2020-11-20 PROCEDURE — 96375 TX/PRO/DX INJ NEW DRUG ADDON: CPT

## 2020-11-20 PROCEDURE — 25000003 PHARM REV CODE 250: Performed by: PSYCHIATRY & NEUROLOGY

## 2020-11-20 RX ORDER — DIPHENHYDRAMINE HYDROCHLORIDE 50 MG/ML
50 INJECTION INTRAMUSCULAR; INTRAVENOUS
Status: DISCONTINUED | OUTPATIENT
Start: 2020-11-20 | End: 2020-11-20 | Stop reason: HOSPADM

## 2020-11-20 RX ORDER — EPINEPHRINE 0.3 MG/.3ML
0.3 INJECTION SUBCUTANEOUS
Status: CANCELLED | OUTPATIENT
Start: 2021-04-09

## 2020-11-20 RX ORDER — ACETAMINOPHEN 500 MG
1000 TABLET ORAL
Status: CANCELLED | OUTPATIENT
Start: 2021-04-09

## 2020-11-20 RX ORDER — HEPARIN 100 UNIT/ML
500 SYRINGE INTRAVENOUS
Status: DISCONTINUED | OUTPATIENT
Start: 2020-11-20 | End: 2020-11-20 | Stop reason: HOSPADM

## 2020-11-20 RX ORDER — FAMOTIDINE 10 MG/ML
20 INJECTION INTRAVENOUS
Status: CANCELLED | OUTPATIENT
Start: 2021-04-09

## 2020-11-20 RX ORDER — SODIUM CHLORIDE 0.9 % (FLUSH) 0.9 %
10 SYRINGE (ML) INJECTION
Status: DISCONTINUED | OUTPATIENT
Start: 2020-11-20 | End: 2020-11-20 | Stop reason: HOSPADM

## 2020-11-20 RX ORDER — ACETAMINOPHEN 500 MG
1000 TABLET ORAL
Status: COMPLETED | OUTPATIENT
Start: 2020-11-20 | End: 2020-11-20

## 2020-11-20 RX ORDER — DIPHENHYDRAMINE HYDROCHLORIDE 50 MG/ML
50 INJECTION INTRAMUSCULAR; INTRAVENOUS
Status: CANCELLED | OUTPATIENT
Start: 2021-04-09

## 2020-11-20 RX ORDER — SODIUM CHLORIDE 0.9 % (FLUSH) 0.9 %
10 SYRINGE (ML) INJECTION
Status: CANCELLED | OUTPATIENT
Start: 2021-04-09

## 2020-11-20 RX ORDER — EPINEPHRINE 0.3 MG/.3ML
0.3 INJECTION SUBCUTANEOUS
Status: DISCONTINUED | OUTPATIENT
Start: 2020-11-20 | End: 2020-11-20 | Stop reason: HOSPADM

## 2020-11-20 RX ORDER — FAMOTIDINE 10 MG/ML
20 INJECTION INTRAVENOUS
Status: COMPLETED | OUTPATIENT
Start: 2020-11-20 | End: 2020-11-20

## 2020-11-20 RX ORDER — HEPARIN 100 UNIT/ML
500 SYRINGE INTRAVENOUS
Status: CANCELLED | OUTPATIENT
Start: 2021-04-09

## 2020-11-20 RX ADMIN — DEXTROSE MONOHYDRATE: 50 INJECTION, SOLUTION INTRAVENOUS at 09:11

## 2020-11-20 RX ADMIN — OCRELIZUMAB 600 MG: 300 INJECTION INTRAVENOUS at 10:11

## 2020-11-20 RX ADMIN — ACETAMINOPHEN 1000 MG: 500 TABLET ORAL at 09:11

## 2020-11-20 RX ADMIN — SODIUM CHLORIDE: 0.9 INJECTION, SOLUTION INTRAVENOUS at 09:11

## 2020-11-20 RX ADMIN — FAMOTIDINE 20 MG: 10 INJECTION INTRAVENOUS at 09:11

## 2020-11-20 RX ADMIN — DIPHENHYDRAMINE HYDROCHLORIDE 50 MG: 50 INJECTION, SOLUTION INTRAMUSCULAR; INTRAVENOUS at 09:11

## 2020-11-20 NOTE — PLAN OF CARE
1515 Tolerated Ocrevus without difficulty. No complaints voiced.  AVS given to pt. Instructed to notify Md with any concerns or problems. Pt verbalized understanding.

## 2020-12-04 ENCOUNTER — CLINICAL SUPPORT (OUTPATIENT)
Dept: INFECTIOUS DISEASES | Facility: CLINIC | Age: 35
End: 2020-12-04
Payer: COMMERCIAL

## 2020-12-04 PROCEDURE — 90636 HEP A/HEP B VACC ADULT IM: CPT | Mod: S$GLB,,, | Performed by: INTERNAL MEDICINE

## 2020-12-04 PROCEDURE — 90750 HZV VACC RECOMBINANT IM: CPT | Mod: S$GLB,,, | Performed by: INTERNAL MEDICINE

## 2020-12-04 PROCEDURE — 99999 PR PBB SHADOW E&M-EST. PATIENT-LVL III: CPT | Mod: PBBFAC,,,

## 2020-12-04 PROCEDURE — 90472 IMMUNIZATION ADMIN EACH ADD: CPT | Mod: S$GLB,,, | Performed by: INTERNAL MEDICINE

## 2020-12-04 PROCEDURE — 99999 PR PBB SHADOW E&M-EST. PATIENT-LVL III: ICD-10-PCS | Mod: PBBFAC,,,

## 2020-12-04 PROCEDURE — 90471 HEPATITIS A HEPATITIS B COMBINED VACCINE IM: ICD-10-PCS | Mod: S$GLB,,, | Performed by: INTERNAL MEDICINE

## 2020-12-04 PROCEDURE — 90750 ZOSTER RECOMBINANT VACCINE: ICD-10-PCS | Mod: S$GLB,,, | Performed by: INTERNAL MEDICINE

## 2020-12-04 PROCEDURE — 90471 IMMUNIZATION ADMIN: CPT | Mod: S$GLB,,, | Performed by: INTERNAL MEDICINE

## 2020-12-04 PROCEDURE — 90636 HEPATITIS A HEPATITIS B COMBINED VACCINE IM: ICD-10-PCS | Mod: S$GLB,,, | Performed by: INTERNAL MEDICINE

## 2020-12-04 PROCEDURE — 90472 ZOSTER RECOMBINANT VACCINE: ICD-10-PCS | Mod: S$GLB,,, | Performed by: INTERNAL MEDICINE

## 2020-12-04 NOTE — PROGRESS NOTES
Patient received shingrix and twinrix vaccines IM in right deltoid. Patient tolerated well, left with NAD.

## 2020-12-28 ENCOUNTER — PATIENT MESSAGE (OUTPATIENT)
Dept: SLEEP MEDICINE | Facility: CLINIC | Age: 35
End: 2020-12-28

## 2020-12-31 ENCOUNTER — OFFICE VISIT (OUTPATIENT)
Dept: SLEEP MEDICINE | Facility: CLINIC | Age: 35
End: 2020-12-31
Payer: COMMERCIAL

## 2020-12-31 DIAGNOSIS — G35 MULTIPLE SCLEROSIS: ICD-10-CM

## 2020-12-31 DIAGNOSIS — G47.33 OSA (OBSTRUCTIVE SLEEP APNEA): Primary | ICD-10-CM

## 2020-12-31 PROCEDURE — 99213 PR OFFICE/OUTPT VISIT, EST, LEVL III, 20-29 MIN: ICD-10-PCS | Mod: 95,,, | Performed by: INTERNAL MEDICINE

## 2020-12-31 PROCEDURE — 99213 OFFICE O/P EST LOW 20 MIN: CPT | Mod: 95,,, | Performed by: INTERNAL MEDICINE

## 2020-12-31 NOTE — PROGRESS NOTES
Subjective:       Patient ID: Sandra France is a 35 y.o. female.    Chief Complaint: Sleep Apnea    HPI     Sandra France returns for PAP follow up for compliance documentation    Sandra France has a history of moderate Obstructive Sleep Apnea diagnosed in 2020 (AHI 29.9).   The last PAP titration was NA with AHI NA at NA cm H20.   Device is 1-2 month(s) old.   Current setting 5-11 cm H20.   [facility].      Sandra France is using CPAP 76.7% of nights and averages 5 hours and 35 minutes.   Device use greater than 4 hours is 73.3%.   Patient does not have problems with the pressure setting.   Mask interface issues are not experienced.   A nasal mask interface is used.   The patient does not experience side effects from therapy.   The patient experiences the following benefits of therapy:  reduced morbidity, reduced accident risk, reduced mortality, reduced cardiovascular risk and less sleep disruption   There are not equipment issues.   Mean pressure 5.9, average peak pressure 8 and 90th percentile pressure 7.5.   Estimated AHI 2.        Scheduled Meds:  Continuous Infusions:  PRN Meds:.           Importance of PAP compliance discussed.   Care and use of equipment discussed.   Download and relevant sleep studies reviewed with patient    Discussed therapeutic goals for positive airway pressure therapy(CPAP or BiPAP).  Ideal is usage 100% of nights for at least 6 hours per night.  Minimum usage is 70% of night for at least 4 hours per night used    The patient was given open opportunity to ask questions and/or express concerns about treatment plan.   All questions/concerns were discussed.            Review of Systems      Objective:      Physical Exam    Assessment:       1. JOHN (obstructive sleep apnea)    2. Multiple sclerosis        Plan:       No change.   Follow up 1 year.       The patient location is: work   The chief complaint leading to consultation is: compliance    Visit type:  audiovisual    Face to Face time with patient: 9  16 minutes of total time spent on the encounter, which includes face to face time and non-face to face time preparing to see the patient (eg, review of tests), Obtaining and/or reviewing separately obtained history, Documenting clinical information in the electronic or other health record, Independently interpreting results (not separately reported) and communicating results to the patient/family/caregiver, or Care coordination (not separately reported).         Each patient to whom he or she provides medical services by telemedicine is:  (1) informed of the relationship between the physician and patient and the respective role of any other health care provider with respect to management of the patient; and (2) notified that he or she may decline to receive medical services by telemedicine and may withdraw from such care at any time.    Notes:

## 2021-02-05 ENCOUNTER — PATIENT MESSAGE (OUTPATIENT)
Dept: NEUROLOGY | Facility: CLINIC | Age: 36
End: 2021-02-05

## 2021-03-30 ENCOUNTER — PATIENT MESSAGE (OUTPATIENT)
Dept: NEUROLOGY | Facility: CLINIC | Age: 36
End: 2021-03-30

## 2021-03-30 ENCOUNTER — OFFICE VISIT (OUTPATIENT)
Dept: NEUROLOGY | Facility: CLINIC | Age: 36
End: 2021-03-30
Payer: COMMERCIAL

## 2021-03-30 VITALS
RESPIRATION RATE: 16 BRPM | DIASTOLIC BLOOD PRESSURE: 76 MMHG | HEART RATE: 112 BPM | WEIGHT: 286.19 LBS | SYSTOLIC BLOOD PRESSURE: 138 MMHG | BODY MASS INDEX: 57.69 KG/M2 | OXYGEN SATURATION: 97 % | HEIGHT: 59 IN

## 2021-03-30 DIAGNOSIS — G35 MULTIPLE SCLEROSIS: ICD-10-CM

## 2021-03-30 DIAGNOSIS — G56.00 CARPAL TUNNEL SYNDROME, UNSPECIFIED LATERALITY: ICD-10-CM

## 2021-03-30 DIAGNOSIS — H46.9 OPTIC NEURITIS: ICD-10-CM

## 2021-03-30 PROBLEM — Z72.0 TOBACCO USE: Status: RESOLVED | Noted: 2019-07-24 | Resolved: 2021-03-30

## 2021-03-30 PROCEDURE — 3075F PR MOST RECENT SYSTOLIC BLOOD PRESS GE 130-139MM HG: ICD-10-PCS | Mod: CPTII,S$GLB,, | Performed by: NURSE PRACTITIONER

## 2021-03-30 PROCEDURE — 99999 PR PBB SHADOW E&M-EST. PATIENT-LVL IV: CPT | Mod: PBBFAC,,, | Performed by: NURSE PRACTITIONER

## 2021-03-30 PROCEDURE — 99999 PR PBB SHADOW E&M-EST. PATIENT-LVL IV: ICD-10-PCS | Mod: PBBFAC,,, | Performed by: NURSE PRACTITIONER

## 2021-03-30 PROCEDURE — 3075F SYST BP GE 130 - 139MM HG: CPT | Mod: CPTII,S$GLB,, | Performed by: NURSE PRACTITIONER

## 2021-03-30 PROCEDURE — 3078F DIAST BP <80 MM HG: CPT | Mod: CPTII,S$GLB,, | Performed by: NURSE PRACTITIONER

## 2021-03-30 PROCEDURE — 3008F BODY MASS INDEX DOCD: CPT | Mod: CPTII,S$GLB,, | Performed by: NURSE PRACTITIONER

## 2021-03-30 PROCEDURE — 1125F AMNT PAIN NOTED PAIN PRSNT: CPT | Mod: S$GLB,,, | Performed by: NURSE PRACTITIONER

## 2021-03-30 PROCEDURE — 99214 OFFICE O/P EST MOD 30 MIN: CPT | Mod: S$GLB,,, | Performed by: NURSE PRACTITIONER

## 2021-03-30 PROCEDURE — 3008F PR BODY MASS INDEX (BMI) DOCUMENTED: ICD-10-PCS | Mod: CPTII,S$GLB,, | Performed by: NURSE PRACTITIONER

## 2021-03-30 PROCEDURE — 3078F PR MOST RECENT DIASTOLIC BLOOD PRESSURE < 80 MM HG: ICD-10-PCS | Mod: CPTII,S$GLB,, | Performed by: NURSE PRACTITIONER

## 2021-03-30 PROCEDURE — 1125F PR PAIN SEVERITY QUANTIFIED, PAIN PRESENT: ICD-10-PCS | Mod: S$GLB,,, | Performed by: NURSE PRACTITIONER

## 2021-03-30 PROCEDURE — 99214 PR OFFICE/OUTPT VISIT, EST, LEVL IV, 30-39 MIN: ICD-10-PCS | Mod: S$GLB,,, | Performed by: NURSE PRACTITIONER

## 2021-03-30 RX ORDER — TOPIRAMATE 100 MG/1
100 TABLET, FILM COATED ORAL 2 TIMES DAILY
Qty: 180 TABLET | Refills: 1 | Status: SHIPPED | OUTPATIENT
Start: 2021-03-30 | End: 2022-05-19 | Stop reason: SDUPTHER

## 2021-04-01 ENCOUNTER — OFFICE VISIT (OUTPATIENT)
Dept: NEUROLOGY | Facility: CLINIC | Age: 36
End: 2021-04-01
Payer: COMMERCIAL

## 2021-04-01 ENCOUNTER — TELEPHONE (OUTPATIENT)
Dept: NEUROLOGY | Facility: CLINIC | Age: 36
End: 2021-04-01

## 2021-04-01 DIAGNOSIS — G47.33 OSA (OBSTRUCTIVE SLEEP APNEA): ICD-10-CM

## 2021-04-01 DIAGNOSIS — Z71.89 COUNSELING REGARDING GOALS OF CARE: ICD-10-CM

## 2021-04-01 DIAGNOSIS — G35 MULTIPLE SCLEROSIS: Primary | ICD-10-CM

## 2021-04-01 DIAGNOSIS — M62.838 MUSCLE SPASM: ICD-10-CM

## 2021-04-01 DIAGNOSIS — R53.83 FATIGUE, UNSPECIFIED TYPE: ICD-10-CM

## 2021-04-01 DIAGNOSIS — G35 MULTIPLE SCLEROSIS: ICD-10-CM

## 2021-04-01 DIAGNOSIS — Z29.89 PROPHYLACTIC IMMUNOTHERAPY: ICD-10-CM

## 2021-04-01 DIAGNOSIS — Z79.899 HIGH RISK MEDICATION USE: ICD-10-CM

## 2021-04-01 PROCEDURE — 1125F PR PAIN SEVERITY QUANTIFIED, PAIN PRESENT: ICD-10-PCS | Mod: ,,, | Performed by: CLINICAL NURSE SPECIALIST

## 2021-04-01 PROCEDURE — 99214 PR OFFICE/OUTPT VISIT, EST, LEVL IV, 30-39 MIN: ICD-10-PCS | Mod: 95,,, | Performed by: CLINICAL NURSE SPECIALIST

## 2021-04-01 PROCEDURE — 99214 OFFICE O/P EST MOD 30 MIN: CPT | Mod: 95,,, | Performed by: CLINICAL NURSE SPECIALIST

## 2021-04-01 PROCEDURE — 1125F AMNT PAIN NOTED PAIN PRSNT: CPT | Mod: ,,, | Performed by: CLINICAL NURSE SPECIALIST

## 2021-04-06 ENCOUNTER — PATIENT MESSAGE (OUTPATIENT)
Dept: NEUROLOGY | Facility: CLINIC | Age: 36
End: 2021-04-06

## 2021-04-20 ENCOUNTER — LAB VISIT (OUTPATIENT)
Dept: LAB | Facility: HOSPITAL | Age: 36
End: 2021-04-20
Attending: CLINICAL NURSE SPECIALIST
Payer: COMMERCIAL

## 2021-04-20 DIAGNOSIS — G35 MULTIPLE SCLEROSIS: ICD-10-CM

## 2021-04-20 LAB
25(OH)D3+25(OH)D2 SERPL-MCNC: 71 NG/ML (ref 30–96)
BASOPHILS # BLD AUTO: 0.09 K/UL (ref 0–0.2)
BASOPHILS NFR BLD: 0.7 % (ref 0–1.9)
DIFFERENTIAL METHOD: ABNORMAL
EOSINOPHIL # BLD AUTO: 0.4 K/UL (ref 0–0.5)
EOSINOPHIL NFR BLD: 3.1 % (ref 0–8)
ERYTHROCYTE [DISTWIDTH] IN BLOOD BY AUTOMATED COUNT: 13.1 % (ref 11.5–14.5)
HCT VFR BLD AUTO: 41.6 % (ref 37–48.5)
HGB BLD-MCNC: 13.6 G/DL (ref 12–16)
IGA SERPL-MCNC: 156 MG/DL (ref 40–350)
IGG SERPL-MCNC: 547 MG/DL (ref 650–1600)
IGM SERPL-MCNC: 20 MG/DL (ref 50–300)
IMM GRANULOCYTES # BLD AUTO: 0.06 K/UL (ref 0–0.04)
IMM GRANULOCYTES NFR BLD AUTO: 0.5 % (ref 0–0.5)
LYMPHOCYTES # BLD AUTO: 2.4 K/UL (ref 1–4.8)
LYMPHOCYTES NFR BLD: 19.9 % (ref 18–48)
MCH RBC QN AUTO: 29.4 PG (ref 27–31)
MCHC RBC AUTO-ENTMCNC: 32.7 G/DL (ref 32–36)
MCV RBC AUTO: 90 FL (ref 82–98)
MONOCYTES # BLD AUTO: 0.9 K/UL (ref 0.3–1)
MONOCYTES NFR BLD: 7.1 % (ref 4–15)
NEUTROPHILS # BLD AUTO: 8.3 K/UL (ref 1.8–7.7)
NEUTROPHILS NFR BLD: 68.7 % (ref 38–73)
NRBC BLD-RTO: 0 /100 WBC
PLATELET # BLD AUTO: 424 K/UL (ref 150–450)
PMV BLD AUTO: 9 FL (ref 9.2–12.9)
RBC # BLD AUTO: 4.63 M/UL (ref 4–5.4)
WBC # BLD AUTO: 12.03 K/UL (ref 3.9–12.7)

## 2021-04-20 PROCEDURE — 86706 HEP B SURFACE ANTIBODY: CPT | Performed by: CLINICAL NURSE SPECIALIST

## 2021-04-20 PROCEDURE — 87340 HEPATITIS B SURFACE AG IA: CPT | Performed by: CLINICAL NURSE SPECIALIST

## 2021-04-20 PROCEDURE — 86704 HEP B CORE ANTIBODY TOTAL: CPT | Performed by: CLINICAL NURSE SPECIALIST

## 2021-04-20 PROCEDURE — 36415 COLL VENOUS BLD VENIPUNCTURE: CPT | Performed by: CLINICAL NURSE SPECIALIST

## 2021-04-20 PROCEDURE — 82784 ASSAY IGA/IGD/IGG/IGM EACH: CPT | Mod: 59 | Performed by: CLINICAL NURSE SPECIALIST

## 2021-04-20 PROCEDURE — 85025 COMPLETE CBC W/AUTO DIFF WBC: CPT | Performed by: CLINICAL NURSE SPECIALIST

## 2021-04-20 PROCEDURE — 88185 FLOWCYTOMETRY/TC ADD-ON: CPT | Performed by: CLINICAL NURSE SPECIALIST

## 2021-04-20 PROCEDURE — 82306 VITAMIN D 25 HYDROXY: CPT | Performed by: CLINICAL NURSE SPECIALIST

## 2021-04-21 ENCOUNTER — PATIENT MESSAGE (OUTPATIENT)
Dept: NEUROLOGY | Facility: CLINIC | Age: 36
End: 2021-04-21

## 2021-04-21 LAB
HBV CORE AB SERPL QL IA: NEGATIVE
HBV SURFACE AB SER-ACNC: NEGATIVE M[IU]/ML
HBV SURFACE AG SERPL QL IA: NEGATIVE

## 2021-04-22 LAB
PATH REPORT.FINAL DX SPEC: NORMAL
RITUXAN SENSITIVITY (CD20): NORMAL

## 2021-04-30 DIAGNOSIS — Z13.9 SCREENING FOR CONDITION: Primary | ICD-10-CM

## 2021-05-06 ENCOUNTER — PATIENT MESSAGE (OUTPATIENT)
Dept: RESEARCH | Facility: HOSPITAL | Age: 36
End: 2021-05-06

## 2021-05-20 ENCOUNTER — PATIENT MESSAGE (OUTPATIENT)
Dept: NEUROLOGY | Facility: CLINIC | Age: 36
End: 2021-05-20

## 2021-05-25 ENCOUNTER — HOSPITAL ENCOUNTER (OUTPATIENT)
Dept: PREADMISSION TESTING | Facility: HOSPITAL | Age: 36
Discharge: HOME | End: 2021-05-25
Attending: CLINICAL NURSE SPECIALIST
Payer: COMMERCIAL

## 2021-05-25 DIAGNOSIS — Z13.9 SCREENING FOR CONDITION: ICD-10-CM

## 2021-05-25 LAB — SARS-COV-2 RNA RESP QL NAA+PROBE: NOT DETECTED

## 2021-05-25 PROCEDURE — U0005 INFEC AGEN DETEC AMPLI PROBE: HCPCS | Performed by: CLINICAL NURSE SPECIALIST

## 2021-05-25 PROCEDURE — U0003 INFECTIOUS AGENT DETECTION BY NUCLEIC ACID (DNA OR RNA); SEVERE ACUTE RESPIRATORY SYNDROME CORONAVIRUS 2 (SARS-COV-2) (CORONAVIRUS DISEASE [COVID-19]), AMPLIFIED PROBE TECHNIQUE, MAKING USE OF HIGH THROUGHPUT TECHNOLOGIES AS DESCRIBED BY CMS-2020-01-R: HCPCS | Performed by: CLINICAL NURSE SPECIALIST

## 2021-05-26 ENCOUNTER — PATIENT MESSAGE (OUTPATIENT)
Dept: NEUROLOGY | Facility: CLINIC | Age: 36
End: 2021-05-26

## 2021-05-31 ENCOUNTER — PATIENT MESSAGE (OUTPATIENT)
Dept: PSYCHIATRY | Facility: CLINIC | Age: 36
End: 2021-05-31

## 2021-06-04 ENCOUNTER — INFUSION (OUTPATIENT)
Dept: INFUSION THERAPY | Facility: HOSPITAL | Age: 36
End: 2021-06-04
Payer: COMMERCIAL

## 2021-06-04 VITALS
DIASTOLIC BLOOD PRESSURE: 60 MMHG | SYSTOLIC BLOOD PRESSURE: 111 MMHG | HEART RATE: 90 BPM | TEMPERATURE: 98 F | RESPIRATION RATE: 18 BRPM

## 2021-06-04 DIAGNOSIS — G35 MULTIPLE SCLEROSIS: Primary | ICD-10-CM

## 2021-06-04 PROCEDURE — 96413 CHEMO IV INFUSION 1 HR: CPT

## 2021-06-04 PROCEDURE — 63600175 PHARM REV CODE 636 W HCPCS: Performed by: PSYCHIATRY & NEUROLOGY

## 2021-06-04 PROCEDURE — 96375 TX/PRO/DX INJ NEW DRUG ADDON: CPT

## 2021-06-04 PROCEDURE — 25000003 PHARM REV CODE 250: Performed by: PSYCHIATRY & NEUROLOGY

## 2021-06-04 PROCEDURE — 96415 CHEMO IV INFUSION ADDL HR: CPT

## 2021-06-04 PROCEDURE — 96367 TX/PROPH/DG ADDL SEQ IV INF: CPT

## 2021-06-04 RX ORDER — FAMOTIDINE 10 MG/ML
20 INJECTION INTRAVENOUS
Status: CANCELLED | OUTPATIENT
Start: 2021-07-09

## 2021-06-04 RX ORDER — FAMOTIDINE 10 MG/ML
20 INJECTION INTRAVENOUS
Status: COMPLETED | OUTPATIENT
Start: 2021-06-04 | End: 2021-06-04

## 2021-06-04 RX ORDER — HEPARIN 100 UNIT/ML
500 SYRINGE INTRAVENOUS
Status: DISCONTINUED | OUTPATIENT
Start: 2021-06-04 | End: 2021-06-04 | Stop reason: HOSPADM

## 2021-06-04 RX ORDER — SODIUM CHLORIDE 0.9 % (FLUSH) 0.9 %
10 SYRINGE (ML) INJECTION
Status: CANCELLED | OUTPATIENT
Start: 2021-07-09

## 2021-06-04 RX ORDER — EPINEPHRINE 0.3 MG/.3ML
0.3 INJECTION SUBCUTANEOUS
Status: DISCONTINUED | OUTPATIENT
Start: 2021-06-04 | End: 2021-06-04 | Stop reason: HOSPADM

## 2021-06-04 RX ORDER — DIPHENHYDRAMINE HYDROCHLORIDE 50 MG/ML
50 INJECTION INTRAMUSCULAR; INTRAVENOUS
Status: CANCELLED | OUTPATIENT
Start: 2021-07-09

## 2021-06-04 RX ORDER — SODIUM CHLORIDE 0.9 % (FLUSH) 0.9 %
10 SYRINGE (ML) INJECTION
Status: DISCONTINUED | OUTPATIENT
Start: 2021-06-04 | End: 2021-06-04 | Stop reason: HOSPADM

## 2021-06-04 RX ORDER — DIPHENHYDRAMINE HYDROCHLORIDE 50 MG/ML
50 INJECTION INTRAMUSCULAR; INTRAVENOUS
Status: DISCONTINUED | OUTPATIENT
Start: 2021-06-04 | End: 2021-06-04 | Stop reason: HOSPADM

## 2021-06-04 RX ORDER — EPINEPHRINE 0.3 MG/.3ML
0.3 INJECTION SUBCUTANEOUS
Status: CANCELLED | OUTPATIENT
Start: 2021-07-09

## 2021-06-04 RX ORDER — HEPARIN 100 UNIT/ML
500 SYRINGE INTRAVENOUS
Status: CANCELLED | OUTPATIENT
Start: 2021-07-09

## 2021-06-04 RX ORDER — ACETAMINOPHEN 500 MG
1000 TABLET ORAL
Status: COMPLETED | OUTPATIENT
Start: 2021-06-04 | End: 2021-06-04

## 2021-06-04 RX ORDER — ACETAMINOPHEN 500 MG
1000 TABLET ORAL
Status: CANCELLED | OUTPATIENT
Start: 2021-07-09

## 2021-06-04 RX ADMIN — ACETAMINOPHEN 1000 MG: 500 TABLET ORAL at 10:06

## 2021-06-04 RX ADMIN — FAMOTIDINE 20 MG: 10 INJECTION INTRAVENOUS at 10:06

## 2021-06-04 RX ADMIN — OCRELIZUMAB 600 MG: 300 INJECTION INTRAVENOUS at 10:06

## 2021-06-04 RX ADMIN — DIPHENHYDRAMINE HYDROCHLORIDE 50 MG: 50 INJECTION, SOLUTION INTRAMUSCULAR; INTRAVENOUS at 10:06

## 2021-06-04 RX ADMIN — SODIUM CHLORIDE: 0.9 INJECTION, SOLUTION INTRAVENOUS at 09:06

## 2021-06-04 RX ADMIN — DEXTROSE: 50 INJECTION, SOLUTION INTRAVENOUS at 10:06

## 2021-09-01 ENCOUNTER — PATIENT MESSAGE (OUTPATIENT)
Dept: PSYCHIATRY | Facility: CLINIC | Age: 36
End: 2021-09-01

## 2021-09-02 ENCOUNTER — PATIENT MESSAGE (OUTPATIENT)
Dept: PSYCHIATRY | Facility: CLINIC | Age: 36
End: 2021-09-02

## 2021-09-04 ENCOUNTER — PATIENT MESSAGE (OUTPATIENT)
Dept: NEUROLOGY | Facility: CLINIC | Age: 36
End: 2021-09-04

## 2021-10-04 ENCOUNTER — PATIENT MESSAGE (OUTPATIENT)
Dept: NEUROLOGY | Facility: CLINIC | Age: 36
End: 2021-10-04

## 2021-10-14 ENCOUNTER — HOSPITAL ENCOUNTER (OUTPATIENT)
Dept: RADIOLOGY | Facility: HOSPITAL | Age: 36
Discharge: HOME | End: 2021-10-14
Attending: CLINICAL NURSE SPECIALIST
Payer: COMMERCIAL

## 2021-10-14 DIAGNOSIS — G35 MULTIPLE SCLEROSIS: ICD-10-CM

## 2021-10-14 LAB
CREAT SERPL-MCNC: 0.7 MG/DL (ref 0.5–1.4)
SAMPLE: NORMAL

## 2021-10-14 PROCEDURE — 70553 MRI BRAIN STEM W/O & W/DYE: CPT | Mod: 26,,, | Performed by: RADIOLOGY

## 2021-10-14 PROCEDURE — 70553 MRI BRAIN DEMYELINATING W/ WO CONTRAST: ICD-10-PCS | Mod: 26,,, | Performed by: RADIOLOGY

## 2021-10-14 PROCEDURE — 70553 MRI BRAIN STEM W/O & W/DYE: CPT | Mod: TC

## 2021-10-14 PROCEDURE — 25500020 PHARM REV CODE 255: Performed by: CLINICAL NURSE SPECIALIST

## 2021-10-14 PROCEDURE — A9585 GADOBUTROL INJECTION: HCPCS | Performed by: CLINICAL NURSE SPECIALIST

## 2021-10-14 RX ORDER — GADOBUTROL 604.72 MG/ML
10 INJECTION INTRAVENOUS
Status: COMPLETED | OUTPATIENT
Start: 2021-10-14 | End: 2021-10-14

## 2021-10-14 RX ADMIN — GADOBUTROL 10 ML: 604.72 INJECTION INTRAVENOUS at 05:10

## 2021-10-25 ENCOUNTER — PATIENT MESSAGE (OUTPATIENT)
Dept: NEUROLOGY | Facility: CLINIC | Age: 36
End: 2021-10-25
Payer: COMMERCIAL

## 2021-10-25 DIAGNOSIS — G35 MULTIPLE SCLEROSIS: Primary | ICD-10-CM

## 2021-10-29 ENCOUNTER — LAB VISIT (OUTPATIENT)
Dept: LAB | Facility: HOSPITAL | Age: 36
End: 2021-10-29
Attending: CLINICAL NURSE SPECIALIST
Payer: COMMERCIAL

## 2021-10-29 ENCOUNTER — PATIENT MESSAGE (OUTPATIENT)
Dept: NEUROLOGY | Facility: CLINIC | Age: 36
End: 2021-10-29
Payer: COMMERCIAL

## 2021-10-29 DIAGNOSIS — G35 MULTIPLE SCLEROSIS: ICD-10-CM

## 2021-10-29 LAB
BASOPHILS # BLD AUTO: 0.09 K/UL (ref 0–0.2)
BASOPHILS NFR BLD: 0.6 % (ref 0–1.9)
DIFFERENTIAL METHOD: ABNORMAL
EOSINOPHIL # BLD AUTO: 0.4 K/UL (ref 0–0.5)
EOSINOPHIL NFR BLD: 2.7 % (ref 0–8)
ERYTHROCYTE [DISTWIDTH] IN BLOOD BY AUTOMATED COUNT: 13.2 % (ref 11.5–14.5)
HCT VFR BLD AUTO: 42.1 % (ref 37–48.5)
HGB BLD-MCNC: 13.7 G/DL (ref 12–16)
IMM GRANULOCYTES # BLD AUTO: 0.09 K/UL (ref 0–0.04)
IMM GRANULOCYTES NFR BLD AUTO: 0.6 % (ref 0–0.5)
LYMPHOCYTES # BLD AUTO: 2.7 K/UL (ref 1–4.8)
LYMPHOCYTES NFR BLD: 18.4 % (ref 18–48)
MCH RBC QN AUTO: 29.6 PG (ref 27–31)
MCHC RBC AUTO-ENTMCNC: 32.5 G/DL (ref 32–36)
MCV RBC AUTO: 91 FL (ref 82–98)
MONOCYTES # BLD AUTO: 1.1 K/UL (ref 0.3–1)
MONOCYTES NFR BLD: 7.4 % (ref 4–15)
NEUTROPHILS # BLD AUTO: 10.2 K/UL (ref 1.8–7.7)
NEUTROPHILS NFR BLD: 70.3 % (ref 38–73)
NRBC BLD-RTO: 0 /100 WBC
PLATELET # BLD AUTO: 414 K/UL (ref 150–450)
PMV BLD AUTO: 9 FL (ref 9.2–12.9)
RBC # BLD AUTO: 4.63 M/UL (ref 4–5.4)
WBC # BLD AUTO: 14.54 K/UL (ref 3.9–12.7)

## 2021-10-29 PROCEDURE — 36415 COLL VENOUS BLD VENIPUNCTURE: CPT | Performed by: CLINICAL NURSE SPECIALIST

## 2021-10-29 PROCEDURE — 85025 COMPLETE CBC W/AUTO DIFF WBC: CPT | Performed by: CLINICAL NURSE SPECIALIST

## 2021-10-29 PROCEDURE — 87340 HEPATITIS B SURFACE AG IA: CPT | Performed by: CLINICAL NURSE SPECIALIST

## 2021-10-29 PROCEDURE — 86706 HEP B SURFACE ANTIBODY: CPT | Performed by: CLINICAL NURSE SPECIALIST

## 2021-10-29 PROCEDURE — 86704 HEP B CORE ANTIBODY TOTAL: CPT | Performed by: CLINICAL NURSE SPECIALIST

## 2021-11-08 ENCOUNTER — PATIENT MESSAGE (OUTPATIENT)
Dept: NEUROLOGY | Facility: CLINIC | Age: 36
End: 2021-11-08
Payer: COMMERCIAL

## 2021-11-16 ENCOUNTER — OFFICE VISIT (OUTPATIENT)
Dept: NEUROLOGY | Facility: CLINIC | Age: 36
End: 2021-11-16
Payer: COMMERCIAL

## 2021-11-16 VITALS
WEIGHT: 278.88 LBS | DIASTOLIC BLOOD PRESSURE: 81 MMHG | HEIGHT: 59 IN | SYSTOLIC BLOOD PRESSURE: 127 MMHG | BODY MASS INDEX: 56.22 KG/M2

## 2021-11-16 DIAGNOSIS — G35 MULTIPLE SCLEROSIS: ICD-10-CM

## 2021-11-16 PROCEDURE — 99999 PR PBB SHADOW E&M-EST. PATIENT-LVL III: CPT | Mod: PBBFAC,,, | Performed by: CLINICAL NURSE SPECIALIST

## 2021-11-16 PROCEDURE — 99214 PR OFFICE/OUTPT VISIT, EST, LEVL IV, 30-39 MIN: ICD-10-PCS | Mod: S$GLB,,, | Performed by: CLINICAL NURSE SPECIALIST

## 2021-11-16 PROCEDURE — 4010F PR ACE/ARB THEARPY RXD/TAKEN: ICD-10-PCS | Mod: CPTII,S$GLB,, | Performed by: CLINICAL NURSE SPECIALIST

## 2021-11-16 PROCEDURE — 3074F SYST BP LT 130 MM HG: CPT | Mod: CPTII,S$GLB,, | Performed by: CLINICAL NURSE SPECIALIST

## 2021-11-16 PROCEDURE — 4010F ACE/ARB THERAPY RXD/TAKEN: CPT | Mod: CPTII,S$GLB,, | Performed by: CLINICAL NURSE SPECIALIST

## 2021-11-16 PROCEDURE — 1159F MED LIST DOCD IN RCRD: CPT | Mod: CPTII,S$GLB,, | Performed by: CLINICAL NURSE SPECIALIST

## 2021-11-16 PROCEDURE — 99214 OFFICE O/P EST MOD 30 MIN: CPT | Mod: S$GLB,,, | Performed by: CLINICAL NURSE SPECIALIST

## 2021-11-16 PROCEDURE — 99999 PR PBB SHADOW E&M-EST. PATIENT-LVL III: ICD-10-PCS | Mod: PBBFAC,,, | Performed by: CLINICAL NURSE SPECIALIST

## 2021-11-16 PROCEDURE — 3074F PR MOST RECENT SYSTOLIC BLOOD PRESSURE < 130 MM HG: ICD-10-PCS | Mod: CPTII,S$GLB,, | Performed by: CLINICAL NURSE SPECIALIST

## 2021-11-16 PROCEDURE — 3079F PR MOST RECENT DIASTOLIC BLOOD PRESSURE 80-89 MM HG: ICD-10-PCS | Mod: CPTII,S$GLB,, | Performed by: CLINICAL NURSE SPECIALIST

## 2021-11-16 PROCEDURE — 3008F PR BODY MASS INDEX (BMI) DOCUMENTED: ICD-10-PCS | Mod: CPTII,S$GLB,, | Performed by: CLINICAL NURSE SPECIALIST

## 2021-11-16 PROCEDURE — 3008F BODY MASS INDEX DOCD: CPT | Mod: CPTII,S$GLB,, | Performed by: CLINICAL NURSE SPECIALIST

## 2021-11-16 PROCEDURE — 3079F DIAST BP 80-89 MM HG: CPT | Mod: CPTII,S$GLB,, | Performed by: CLINICAL NURSE SPECIALIST

## 2021-11-16 PROCEDURE — 1159F PR MEDICATION LIST DOCUMENTED IN MEDICAL RECORD: ICD-10-PCS | Mod: CPTII,S$GLB,, | Performed by: CLINICAL NURSE SPECIALIST

## 2021-11-16 RX ORDER — GABAPENTIN 100 MG/1
100 CAPSULE ORAL NIGHTLY PRN
Qty: 30 CAPSULE | Refills: 3 | Status: SHIPPED | OUTPATIENT
Start: 2021-11-16 | End: 2023-08-21 | Stop reason: SDUPTHER

## 2021-11-16 RX ORDER — ELAGOLIX 200 MG/1
200 TABLET, FILM COATED ORAL 2 TIMES DAILY
COMMUNITY
End: 2022-11-15

## 2021-11-22 ENCOUNTER — PATIENT MESSAGE (OUTPATIENT)
Dept: NEUROLOGY | Facility: CLINIC | Age: 36
End: 2021-11-22
Payer: COMMERCIAL

## 2021-12-21 ENCOUNTER — PATIENT MESSAGE (OUTPATIENT)
Dept: NEUROLOGY | Facility: CLINIC | Age: 36
End: 2021-12-21
Payer: COMMERCIAL

## 2021-12-23 ENCOUNTER — INFUSION (OUTPATIENT)
Dept: INFUSION THERAPY | Facility: HOSPITAL | Age: 36
End: 2021-12-23
Payer: COMMERCIAL

## 2021-12-23 VITALS
DIASTOLIC BLOOD PRESSURE: 64 MMHG | BODY MASS INDEX: 55.78 KG/M2 | RESPIRATION RATE: 18 BRPM | SYSTOLIC BLOOD PRESSURE: 129 MMHG | HEIGHT: 59 IN | TEMPERATURE: 98 F | WEIGHT: 276.69 LBS | HEART RATE: 87 BPM

## 2021-12-23 DIAGNOSIS — G35 MULTIPLE SCLEROSIS: Primary | ICD-10-CM

## 2021-12-23 PROCEDURE — 96375 TX/PRO/DX INJ NEW DRUG ADDON: CPT

## 2021-12-23 PROCEDURE — 96367 TX/PROPH/DG ADDL SEQ IV INF: CPT

## 2021-12-23 PROCEDURE — 96365 THER/PROPH/DIAG IV INF INIT: CPT

## 2021-12-23 PROCEDURE — 63600175 PHARM REV CODE 636 W HCPCS: Performed by: PSYCHIATRY & NEUROLOGY

## 2021-12-23 PROCEDURE — 25000003 PHARM REV CODE 250: Performed by: PSYCHIATRY & NEUROLOGY

## 2021-12-23 PROCEDURE — 96366 THER/PROPH/DIAG IV INF ADDON: CPT

## 2021-12-23 RX ORDER — DIPHENHYDRAMINE HYDROCHLORIDE 50 MG/ML
50 INJECTION INTRAMUSCULAR; INTRAVENOUS
Status: CANCELLED | OUTPATIENT
Start: 2022-04-07

## 2021-12-23 RX ORDER — DIPHENHYDRAMINE HYDROCHLORIDE 50 MG/ML
50 INJECTION INTRAMUSCULAR; INTRAVENOUS
Status: DISCONTINUED | OUTPATIENT
Start: 2021-12-23 | End: 2021-12-23 | Stop reason: HOSPADM

## 2021-12-23 RX ORDER — ACETAMINOPHEN 500 MG
1000 TABLET ORAL
Status: COMPLETED | OUTPATIENT
Start: 2021-12-23 | End: 2021-12-23

## 2021-12-23 RX ORDER — SODIUM CHLORIDE 0.9 % (FLUSH) 0.9 %
10 SYRINGE (ML) INJECTION
Status: DISCONTINUED | OUTPATIENT
Start: 2021-12-23 | End: 2021-12-23 | Stop reason: HOSPADM

## 2021-12-23 RX ORDER — FAMOTIDINE 10 MG/ML
20 INJECTION INTRAVENOUS
Status: COMPLETED | OUTPATIENT
Start: 2021-12-23 | End: 2021-12-23

## 2021-12-23 RX ORDER — SODIUM CHLORIDE 0.9 % (FLUSH) 0.9 %
10 SYRINGE (ML) INJECTION
Status: CANCELLED | OUTPATIENT
Start: 2022-04-07

## 2021-12-23 RX ORDER — EPINEPHRINE 0.3 MG/.3ML
0.3 INJECTION SUBCUTANEOUS
Status: CANCELLED | OUTPATIENT
Start: 2022-04-07

## 2021-12-23 RX ORDER — HEPARIN 100 UNIT/ML
500 SYRINGE INTRAVENOUS
Status: DISCONTINUED | OUTPATIENT
Start: 2021-12-23 | End: 2021-12-23 | Stop reason: HOSPADM

## 2021-12-23 RX ORDER — HEPARIN 100 UNIT/ML
500 SYRINGE INTRAVENOUS
Status: CANCELLED | OUTPATIENT
Start: 2022-04-07

## 2021-12-23 RX ORDER — EPINEPHRINE 0.3 MG/.3ML
0.3 INJECTION SUBCUTANEOUS
Status: DISCONTINUED | OUTPATIENT
Start: 2021-12-23 | End: 2021-12-23 | Stop reason: HOSPADM

## 2021-12-23 RX ORDER — ACETAMINOPHEN 500 MG
1000 TABLET ORAL
Status: CANCELLED | OUTPATIENT
Start: 2022-04-07

## 2021-12-23 RX ORDER — FAMOTIDINE 10 MG/ML
20 INJECTION INTRAVENOUS
Status: CANCELLED | OUTPATIENT
Start: 2022-04-07

## 2021-12-23 RX ADMIN — DIPHENHYDRAMINE HYDROCHLORIDE 50 MG: 50 INJECTION, SOLUTION INTRAMUSCULAR; INTRAVENOUS at 08:12

## 2021-12-23 RX ADMIN — SODIUM CHLORIDE: 0.9 INJECTION, SOLUTION INTRAVENOUS at 08:12

## 2021-12-23 RX ADMIN — DEXTROSE: 50 INJECTION, SOLUTION INTRAVENOUS at 09:12

## 2021-12-23 RX ADMIN — OCRELIZUMAB 600 MG: 300 INJECTION INTRAVENOUS at 09:12

## 2021-12-23 RX ADMIN — FAMOTIDINE 20 MG: 10 INJECTION INTRAVENOUS at 08:12

## 2021-12-23 RX ADMIN — ACETAMINOPHEN 1000 MG: 500 TABLET ORAL at 08:12

## 2022-01-29 DIAGNOSIS — D84.9 IMMUNOSUPPRESSED STATUS: ICD-10-CM

## 2022-02-01 ENCOUNTER — PATIENT MESSAGE (OUTPATIENT)
Dept: SLEEP MEDICINE | Facility: CLINIC | Age: 37
End: 2022-02-01
Payer: COMMERCIAL

## 2022-02-04 DIAGNOSIS — D84.9 IMMUNOSUPPRESSED STATUS: ICD-10-CM

## 2022-02-06 DIAGNOSIS — D84.9 IMMUNOSUPPRESSED STATUS: ICD-10-CM

## 2022-02-28 ENCOUNTER — PATIENT MESSAGE (OUTPATIENT)
Dept: PSYCHIATRY | Facility: CLINIC | Age: 37
End: 2022-02-28
Payer: COMMERCIAL

## 2022-04-06 ENCOUNTER — TELEPHONE (OUTPATIENT)
Dept: NEUROLOGY | Facility: CLINIC | Age: 37
End: 2022-04-06

## 2022-04-06 NOTE — TELEPHONE ENCOUNTER
----- Message from Jordan De sent at 4/6/2022 11:54 AM CDT -----  Contact: Patient  Pt needs to speak w/ nurse in regards to rescheduling appt    Call back @[phone]

## 2022-04-11 ENCOUNTER — PATIENT MESSAGE (OUTPATIENT)
Dept: NEUROLOGY | Facility: CLINIC | Age: 37
End: 2022-04-11
Payer: COMMERCIAL

## 2022-05-06 ENCOUNTER — PATIENT MESSAGE (OUTPATIENT)
Dept: NEUROLOGY | Facility: CLINIC | Age: 37
End: 2022-05-06
Payer: COMMERCIAL

## 2022-05-09 ENCOUNTER — DOCUMENTATION ONLY (OUTPATIENT)
Dept: NEUROLOGY | Facility: CLINIC | Age: 37
End: 2022-05-09
Payer: COMMERCIAL

## 2022-05-16 NOTE — PROGRESS NOTES
Subjective:          Patient ID: Sandra France is a 36 y.o. female who presents today for a routine virtual visit for MS.  She was last seen in November 2021. The history has been provided by the patient.       The patient location is: her car   The chief complaint leading to consultation is: MS     Visit type: audiovisual    Face to Face time with patient: 15 minutes   20 minutes of total time spent on the encounter, which includes face to face time and non-face to face time preparing to see the patient (eg, review of tests), Obtaining and/or reviewing separately obtained history, Documenting clinical information in the electronic or other health record, Independently interpreting results (not separately reported) and communicating results to the patient/family/caregiver, or Care coordination (not separately reported).     Each patient to whom he or she provides medical services by telemedicine is:  (1) informed of the relationship between the physician and patient and the respective role of any other health care provider with respect to management of the patient; and (2) notified that he or she may decline to receive medical services by telemedicine and may withdraw from such care at any time.    MS HPI:  · DMT: ocrelizumab --last infused in December   · Side effects from DMT? No  · Taking vitamin D3 as recommended? Yes -  Dose: 10,000 units 5 days a week     She has had more fatigue in the last few weeks. She feels like part of this is due to the heat. She also admits that she hasn't been eating as well.   She has had some burning in the tips of her fingertips in both hands that comes and goes. She is not having any new neck pain. She does go to the chiropractor on Thursday. She will let me know if the burning resolves after this.   Her coordination has been ok.   She had a fall a few months ago getting into the bathtub (slipped). She rolled her left ankle when walking a few weeks ago.   Her mobility has  been good.   She works full-time.     Medications:  Current Outpatient Medications   Medication Sig    buPROPion (WELLBUTRIN XL) 300 MG 24 hr tablet Take 1 tablet (300 mg total) by mouth once daily.    elagolix (ORILISSA) 200 mg Tab Take 200 mg by mouth 2 (two) times daily.    ergocalciferol, vitamin D2, (VITAMIN D ORAL) Take 5,000 Units by mouth once daily.     escitalopram oxalate (LEXAPRO) 10 MG tablet Take 20 mg by mouth once daily.    FERROUS SULFATE ORAL Take 1 tablet by mouth once daily.     gabapentin (NEURONTIN) 100 MG capsule Take 1 capsule (100 mg total) by mouth nightly as needed.    ibuprofen (ADVIL,MOTRIN) 200 MG tablet Take 800 mg by mouth daily as needed for Pain.    levothyroxine (SYNTHROID) 75 MCG tablet Take 75 mcg by mouth before breakfast.     liothyronine (CYTOMEL) 5 MCG Tab Take 10 mcg by mouth once daily.     lisinopril 10 MG tablet Take 10 mg by mouth once daily.    omeprazole/sodium bicarbonate (ZEGERID OTC ORAL) Take 1 tablet by mouth 2 (two) times daily as needed.     topiramate (TOPAMAX) 100 MG tablet Take 1 tablet (100 mg total) by mouth 2 (two) times daily.       SOCIAL HISTORY  Social History     Tobacco Use    Smoking status: Former Smoker     Packs/day: 0.25     Years: 12.00     Pack years: 3.00     Types: Cigarettes     Start date: 8/17/2020    Smokeless tobacco: Never Used   Substance Use Topics    Alcohol use: No    Drug use: Never       Living arrangements - the patient lives with their family.    ROS:  REVIEW OF SYMPTOMS 5/17/22   Do you feel abnormally tired on most days? Yes   Do you feel you generally sleep well? No--she has not been using her CPAP because her unit was recalled. She has the new unit, but has not set it up yet.    Do you have difficulty controlling your bladder?  No--denies UTIs    Do you have difficulty controlling your bowels?  No   Do you have frequent muscle cramps, tightness or spasms in your limbs?  No   Do you have new visual symptoms?   No   Do you have worsening difficulty with your memory or thinking? No--stable; job performance is good    Do you have worsening symptoms of anxiety or depression?  No--well controlled on Wellbutrin and Lexapro    For patients who walk, Do you have more difficulty walking?  No   Have you fallen since your last visit?  Yes    For patients who use wheelchairs: Do you have any skin wounds or breakdown? Not Applicable   Do you have difficulty using your hands?  No   Do you have shooting or burning pain? In fingertips, as above; she continues to feel achy in her legs and arms if she is really tired or has had a long day.    Do you have difficulty with sexual function?  No   If you are sexually active, are you using birth control? Y/N  N/A Yes   Do you often choke when swallowing liquids or solid food?  No   Do you experience worsening symptoms when overheated? Yes--tries to avoid the heat as much as possible; she is exploring some cooling devices    Do you need any new equipment such as a wheelchair, walker or shower chair? No   Do you receive co-pay financial assistance for your principal MS medicine? Yes   Would you be interested in participating in an MS research trial in the future? Yes   For patients on Gilenya, Tecfidera, Aubagio, Rituxan, Ocrevus, Tysabri, Lemtrada or Methotrexate, are you aware that you should NOT receive live virus vaccines?  Yes   Do you feel you have adequate family/friend support?  Yes   Do you have health insurance?   Yes   Are you currently employed? Yes   Do you receive SSDI/SSI?  Not Applicable   Do you use marijuana or cannabis products? No   Have you been diagnosed with a urinary tract infection since your last visit here? No   Have you been diagnosed with a respiratory tract infection since your last visit here? No   Have you been to the emergency room since your last visit here? No   Have you been hospitalized since your last visit here?  No                Objective:        1. 25 foot  timed walk:  Timed 25 Foot Walk: 8/19/2020 11/16/2021   Did patient wear an AFO? No No   Was assistive device used? No No   Time for 25 Foot Walk (seconds) 4.16 4.18   Time for 25 Foot Walk (seconds) 4.2 4.53       Neurologic Exam  Deferred     Imaging:         Results for orders placed during the hospital encounter of 10/14/21    MRI Brain Demyelinating W W/O Contrast    Impression  Brain appears stable from prior exam, again demonstrating findings compatible with the reported history of multiple sclerosis.  No new or enhancing lesions to indicate ongoing or active demyelination.    Electronically signed by resident: Prema Russ  Date:    10/15/2021  Time:    08:06    Electronically signed by: Michael Sesay MD  Date:    10/15/2021  Time:    13:02    Results for orders placed during the hospital encounter of 09/30/19    MRI Cervical Spine Demyelinating W W/O Contrast    Impression  Subtle short-segment STIR signal hyperintensity cervical spinal cord dorsally at the C6 vertebral body level which may be artifactual without corresponding signal abnormality on additional sequences.  Sequela of prior demyelination to be considered in light of history.    No evidence for additional edema signal throughout the remaining cervicothoracic cord.  No abnormal intrathecal enhancement.    Mild degenerative changes as detailed above      Electronically signed by: Edward Elena DO  Date:    09/30/2019  Time:    11:13    Results for orders placed during the hospital encounter of 09/30/19    MRI Thoracic Spine Demyelinating W W/O Contrast    Impression  Subtle short-segment STIR signal hyperintensity cervical spinal cord dorsally at the C6 vertebral body level which may be artifactual without corresponding signal abnormality on additional sequences.  Sequela of prior demyelination to be considered in light of history.    No evidence for additional edema signal throughout the remaining cervicothoracic cord.  No abnormal intrathecal  enhancement.    Mild degenerative changes as detailed above      Electronically signed by: Edward Elena DO  Date:    09/30/2019  Time:    11:13        Labs:     Lab Results   Component Value Date    LOIWMAKH33FL 71 04/20/2021    KPABTKLP53XC 34 05/14/2020    MJKVLJZY97LM 60 09/30/2019     Lab Results   Component Value Date    JCVINDEX 0.12 05/14/2020    JCVANTIBODY Negative 05/14/2020     Lab Results   Component Value Date    JA6OSZUZ 81.0 05/14/2020    ABSOLUTECD3 5009.0 (H) 05/14/2020    TW2GAFVM 21.9 05/14/2020    ABSOLUTECD8 1351.0 (H) 05/14/2020    JI5ODHPV 58.7 (H) 05/14/2020    ABSOLUTECD4 3629.0 (H) 05/14/2020    LABCD48 2.69 05/14/2020     Lab Results   Component Value Date    WBC 14.54 (H) 10/29/2021    RBC 4.63 10/29/2021    HGB 13.7 10/29/2021    HCT 42.1 10/29/2021    MCV 91 10/29/2021    MCH 29.6 10/29/2021    MCHC 32.5 10/29/2021    RDW 13.2 10/29/2021     10/29/2021    MPV 9.0 (L) 10/29/2021    GRAN 10.2 (H) 10/29/2021    GRAN 70.3 10/29/2021    LYMPH 2.7 10/29/2021    LYMPH 18.4 10/29/2021    MONO 1.1 (H) 10/29/2021    MONO 7.4 10/29/2021    EOS 0.4 10/29/2021    BASO 0.09 10/29/2021    EOSINOPHIL 2.7 10/29/2021    BASOPHIL 0.6 10/29/2021     Sodium   Date Value Ref Range Status   05/14/2020 137 136 - 145 mmol/L Final     Potassium   Date Value Ref Range Status   05/14/2020 3.7 3.5 - 5.1 mmol/L Final     Chloride   Date Value Ref Range Status   05/14/2020 104 95 - 110 mmol/L Final     CO2   Date Value Ref Range Status   05/14/2020 24 23 - 29 mmol/L Final     Glucose   Date Value Ref Range Status   05/14/2020 96 70 - 110 mg/dL Final     BUN   Date Value Ref Range Status   05/14/2020 24 (H) 6 - 20 mg/dL Final     Creatinine   Date Value Ref Range Status   05/14/2020 0.7 0.5 - 1.4 mg/dL Final     Calcium   Date Value Ref Range Status   05/14/2020 8.4 (L) 8.7 - 10.5 mg/dL Final     Total Protein   Date Value Ref Range Status   05/14/2020 6.2 6.0 - 8.4 g/dL Final     Albumin   Date Value Ref  Range Status   05/14/2020 3.3 (L) 3.5 - 5.2 g/dL Final     Total Bilirubin   Date Value Ref Range Status   05/14/2020 0.8 0.1 - 1.0 mg/dL Final     Comment:     For infants and newborns, interpretation of results should be based  on gestational age, weight and in agreement with clinical  observations.  Premature Infant recommended reference ranges:  Up to 24 hours.............<8.0 mg/dL  Up to 48 hours............<12.0 mg/dL  3-5 days..................<15.0 mg/dL  6-29 days.................<15.0 mg/dL       Alkaline Phosphatase   Date Value Ref Range Status   05/14/2020 56 55 - 135 U/L Final     AST   Date Value Ref Range Status   05/14/2020 7 (L) 10 - 40 U/L Final     ALT   Date Value Ref Range Status   05/14/2020 20 10 - 44 U/L Final     Anion Gap   Date Value Ref Range Status   05/14/2020 9 8 - 16 mmol/L Final     eGFR if    Date Value Ref Range Status   05/14/2020 >60 >60 mL/min/1.73 m^2 Final     eGFR if non    Date Value Ref Range Status   05/14/2020 >60 >60 mL/min/1.73 m^2 Final     Comment:     Calculation used to obtain the estimated glomerular filtration  rate (eGFR) is the CKD-EPI equation.        Lab Results   Component Value Date    HEPBSAG Negative 10/29/2021    HEPBSAB Negative 10/29/2021    HEPBCAB Negative 10/29/2021           MS Impression and Plan:     NEURO MULTIPLE SCLEROSIS IMPRESSION:   MS Status:     Number of relapses in the past year?:  0    Clinical Progression:  Clinically Stable    MRI Progression:  Stable  Plan:     DMT:  No change in management    DMT comment:  Continue Ocrevus and Vitamin D. Her next infusion is due in late June. Safety labs have been ordered for today. She is aware of the risks associated with immunosuppressant therapy, including increased risk of infection.     We discussed Evusheld. I will send her a fact sheet about this.     Symptom Management:  Implement change in symptom management    Implement Change in Symptom Management:   Fatigue (We will check B12 today to screen for additional contributors to fatigue. )       MRI brain and c-spine ordered for October. If burning in fingertips worsens or more becomes more frequent, I may consider moving up these scans.   She will follow up with Dr. Campos after the MRIs.       NICHELLE Navarro, CNS     Problem List Items Addressed This Visit        Neurologic Problems    Multiple sclerosis - Primary    Relevant Orders    CBC auto differential    IMMUNOGLOBULINS (IGG, IGA, IGM) QUANTITATIVE    Hepatitis B Surface Antigen    Hepatitis B Surface Ab, Qualitative    Hepatitis B Core Antibody, Total    Vitamin D    Vitamin B12    MRI Brain Demyelinating Without Contrast    MRI Cervical Spine Demyelinating Without Contrast

## 2022-05-17 ENCOUNTER — OFFICE VISIT (OUTPATIENT)
Dept: NEUROLOGY | Facility: CLINIC | Age: 37
End: 2022-05-17
Payer: COMMERCIAL

## 2022-05-17 ENCOUNTER — PATIENT MESSAGE (OUTPATIENT)
Dept: NEUROLOGY | Facility: CLINIC | Age: 37
End: 2022-05-17

## 2022-05-17 ENCOUNTER — TELEPHONE (OUTPATIENT)
Dept: NEUROLOGY | Facility: CLINIC | Age: 37
End: 2022-05-17

## 2022-05-17 DIAGNOSIS — Z71.89 COUNSELING REGARDING GOALS OF CARE: ICD-10-CM

## 2022-05-17 DIAGNOSIS — G35 MULTIPLE SCLEROSIS: Primary | ICD-10-CM

## 2022-05-17 DIAGNOSIS — Z29.89 PROPHYLACTIC IMMUNOTHERAPY: ICD-10-CM

## 2022-05-17 DIAGNOSIS — Z79.899 HIGH RISK MEDICATION USE: ICD-10-CM

## 2022-05-17 PROCEDURE — 1159F MED LIST DOCD IN RCRD: CPT | Mod: CPTII,95,, | Performed by: CLINICAL NURSE SPECIALIST

## 2022-05-17 PROCEDURE — 99213 PR OFFICE/OUTPT VISIT, EST, LEVL III, 20-29 MIN: ICD-10-PCS | Mod: 95,,, | Performed by: CLINICAL NURSE SPECIALIST

## 2022-05-17 PROCEDURE — 4010F PR ACE/ARB THEARPY RXD/TAKEN: ICD-10-PCS | Mod: CPTII,95,, | Performed by: CLINICAL NURSE SPECIALIST

## 2022-05-17 PROCEDURE — 99213 OFFICE O/P EST LOW 20 MIN: CPT | Mod: 95,,, | Performed by: CLINICAL NURSE SPECIALIST

## 2022-05-17 PROCEDURE — 4010F ACE/ARB THERAPY RXD/TAKEN: CPT | Mod: CPTII,95,, | Performed by: CLINICAL NURSE SPECIALIST

## 2022-05-17 PROCEDURE — 1159F PR MEDICATION LIST DOCUMENTED IN MEDICAL RECORD: ICD-10-PCS | Mod: CPTII,95,, | Performed by: CLINICAL NURSE SPECIALIST

## 2022-05-17 NOTE — TELEPHONE ENCOUNTER
----- Message from NICHELLE Jimenez, CNS sent at 5/17/2022 11:21 AM CDT -----  1.) Labs this week--just use my orders, not BB's   2.) MRIs in October  3.) F/U with BB after MRI

## 2022-05-19 ENCOUNTER — OFFICE VISIT (OUTPATIENT)
Dept: NEUROLOGY | Facility: CLINIC | Age: 37
End: 2022-05-19
Payer: COMMERCIAL

## 2022-05-19 VITALS
BODY MASS INDEX: 57.71 KG/M2 | OXYGEN SATURATION: 97 % | DIASTOLIC BLOOD PRESSURE: 72 MMHG | HEART RATE: 93 BPM | WEIGHT: 285.69 LBS | SYSTOLIC BLOOD PRESSURE: 122 MMHG

## 2022-05-19 DIAGNOSIS — F41.8 DEPRESSION WITH ANXIETY: ICD-10-CM

## 2022-05-19 DIAGNOSIS — G56.00 CARPAL TUNNEL SYNDROME, UNSPECIFIED LATERALITY: ICD-10-CM

## 2022-05-19 DIAGNOSIS — G35 MULTIPLE SCLEROSIS: ICD-10-CM

## 2022-05-19 DIAGNOSIS — G47.33 OSA (OBSTRUCTIVE SLEEP APNEA): ICD-10-CM

## 2022-05-19 DIAGNOSIS — G43.709 CHRONIC MIGRAINE WITHOUT AURA WITHOUT STATUS MIGRAINOSUS, NOT INTRACTABLE: Primary | ICD-10-CM

## 2022-05-19 DIAGNOSIS — H46.9 OPTIC NEURITIS: ICD-10-CM

## 2022-05-19 PROCEDURE — 99214 OFFICE O/P EST MOD 30 MIN: CPT | Mod: S$GLB,,, | Performed by: NURSE PRACTITIONER

## 2022-05-19 PROCEDURE — 3074F SYST BP LT 130 MM HG: CPT | Mod: CPTII,S$GLB,, | Performed by: NURSE PRACTITIONER

## 2022-05-19 PROCEDURE — 3078F PR MOST RECENT DIASTOLIC BLOOD PRESSURE < 80 MM HG: ICD-10-PCS | Mod: CPTII,S$GLB,, | Performed by: NURSE PRACTITIONER

## 2022-05-19 PROCEDURE — 4010F PR ACE/ARB THEARPY RXD/TAKEN: ICD-10-PCS | Mod: CPTII,S$GLB,, | Performed by: NURSE PRACTITIONER

## 2022-05-19 PROCEDURE — 99999 PR PBB SHADOW E&M-EST. PATIENT-LVL IV: ICD-10-PCS | Mod: PBBFAC,,, | Performed by: NURSE PRACTITIONER

## 2022-05-19 PROCEDURE — 1159F PR MEDICATION LIST DOCUMENTED IN MEDICAL RECORD: ICD-10-PCS | Mod: CPTII,S$GLB,, | Performed by: NURSE PRACTITIONER

## 2022-05-19 PROCEDURE — 1160F PR REVIEW ALL MEDS BY PRESCRIBER/CLIN PHARMACIST DOCUMENTED: ICD-10-PCS | Mod: CPTII,S$GLB,, | Performed by: NURSE PRACTITIONER

## 2022-05-19 PROCEDURE — 1160F RVW MEDS BY RX/DR IN RCRD: CPT | Mod: CPTII,S$GLB,, | Performed by: NURSE PRACTITIONER

## 2022-05-19 PROCEDURE — 4010F ACE/ARB THERAPY RXD/TAKEN: CPT | Mod: CPTII,S$GLB,, | Performed by: NURSE PRACTITIONER

## 2022-05-19 PROCEDURE — 3078F DIAST BP <80 MM HG: CPT | Mod: CPTII,S$GLB,, | Performed by: NURSE PRACTITIONER

## 2022-05-19 PROCEDURE — 3008F PR BODY MASS INDEX (BMI) DOCUMENTED: ICD-10-PCS | Mod: CPTII,S$GLB,, | Performed by: NURSE PRACTITIONER

## 2022-05-19 PROCEDURE — 1159F MED LIST DOCD IN RCRD: CPT | Mod: CPTII,S$GLB,, | Performed by: NURSE PRACTITIONER

## 2022-05-19 PROCEDURE — 3074F PR MOST RECENT SYSTOLIC BLOOD PRESSURE < 130 MM HG: ICD-10-PCS | Mod: CPTII,S$GLB,, | Performed by: NURSE PRACTITIONER

## 2022-05-19 PROCEDURE — 3008F BODY MASS INDEX DOCD: CPT | Mod: CPTII,S$GLB,, | Performed by: NURSE PRACTITIONER

## 2022-05-19 PROCEDURE — 99214 PR OFFICE/OUTPT VISIT, EST, LEVL IV, 30-39 MIN: ICD-10-PCS | Mod: S$GLB,,, | Performed by: NURSE PRACTITIONER

## 2022-05-19 PROCEDURE — 99999 PR PBB SHADOW E&M-EST. PATIENT-LVL IV: CPT | Mod: PBBFAC,,, | Performed by: NURSE PRACTITIONER

## 2022-05-19 RX ORDER — TOPIRAMATE 100 MG/1
100 TABLET, FILM COATED ORAL 2 TIMES DAILY
Qty: 180 TABLET | Refills: 3 | Status: SHIPPED | OUTPATIENT
Start: 2022-05-19 | End: 2023-10-18

## 2022-05-19 NOTE — PROGRESS NOTES
HPI: Sandra France is a 36 y.o. female with MS, chronic migraine, and anxiety. She has hypothyroidism. She is a manager at a veterinary office.     She presents today for a follow up visit for chronic migraine. Her MS is followed by an MS Specialist Neurologist at [facility]. MS symptoms overall stable, and she continues on Ocrevus with Vitamin D. Repeat Imaging planned for 10/2022.     She was diagnosed with endometriosis and takes Orlissa for this.     Migraines remained controlled with Topamax. She has breakthrough if she misses her evening dose. She still has an IUD.     Intermittent neck tension helped with chiropractic care. If she doesn't relieve her neck tension, she will experience more breakthrough headaches.     Her CPAP got recalled. She finally received her new machine, but hasn't gotten it set up yet. She plans to coordinate this soon.     She sees Hematology prior for thrombocytosis and leukocytosis. CBC is improved now, and she is following up less frequently. WBC count still elevated. Hematology attributes this to inflammatory factors. She was taken off of Iron supplements to discern if she could tolerate being off of this.     Anxiety managed by PCP. She hasn't seen Psychiatry since Dr. Perdomo left [place], but mood is well managed currently.     Review of Systems   Constitutional: Negative for fever.   HENT: Negative for nosebleeds.    Eyes: Negative for double vision.   Respiratory: Negative for hemoptysis.    Cardiovascular: Negative for leg swelling.   Gastrointestinal: Negative for blood in stool.   Genitourinary: Negative for hematuria.   Musculoskeletal: Positive for neck pain. Negative for falls.   Skin: Negative for rash.   Neurological: Positive for headaches. Negative for focal weakness.   Psychiatric/Behavioral: The patient is not nervous/anxious and does not have insomnia.        I have reviewed all of this patient's past medical and surgical histories as well as family and social  histories and active allergies and medications as documented in the electronic medical record.    Exam:  Gen Appearance, well developed/nourished in no apparent distress  CV: 2+ distal pulses with no edema or swelling  Neuro:  MS: Awake, alert, oriented to place, person, time, situation. Sustains attention. Recent/remote memory intact, Language is full to spontaneous speech/comprehension. Fund of Knowledge is full  CN: Optic discs are flat with normal vasculature, PERRL, Extraoccular movements and visual fields are full. Normal facial sensation and strength, Hearing symmetric, Tongue and Palate are midline and strong. Shoulder Shrug symmetric and strong.  Motor: Normal bulk, tone, no abnormal movements. 5/5 strength bilateral upper/lower extremities with 2+ reflexes and no clonus  Sensory: symmetric to light touch, pain, temp, and vibration,  Romberg negative  Cerebellar: Finger-nose,Heal-shin, Rapid alternating movements intact  Gait: Normal stance, no ataxia    Imaging:   MRI Brain 5/2020:    Interval development of scattered small to punctate size new T2 FLAIR hyperintense lesions with corresponding enhancement throughout the brain parenchyma as detailed above.  In light of history and prior findings most compatible with active areas of demyelination with largest focus of enhancement measuring 9 mm in the right posterior temporal deep white matter.  Clinical correlation and follow-up recommended.    7/2019 MRI Brain:   1. Multiple areas of abnormal signal alteration involving the periventricular white matter best demonstrated on T2/FLAIR imaging compatible with patient's history of multiple sclerosis.  2. New 12 mm lesion within the right parietal lobe with increased signal intensity on diffusion-weighted imaging not present on the previous study from July 24, 2018.    7/24/18 MRI Brain: Multiple areas of increased white matter signal in a pattern suggestive of multiple sclerosis, measuring up to 1 cm in the  right frontal periventricular region.  Nothing enhancing    7/24/18 MRI C spine: 1. No evidence demyelinating process within the cervical cord.  2. Mild disc bulges as above from C3-6, greatest at C5-6.    7/24/18 MRI T spine: The thoracic spine demonstrates a normal kyphosis.  There is disc desiccation and loss of disc height from T6-9.  There is a left paracentral disc herniation at T8-9 with contact and mild deformity of the left paracentral cord.  No other focal disc protrusion is noted.  No abnormal cord signal is seen.  No abnormal enhancement is seen within the thoracic cord.  Vertebral body marrow signal is unremarkable.     Labs:   8/2018 Lyme titers, FRENCH, hep panel, HiV, RPRP Ace-level, SSA/SSB antibodies, ESR, and paraneoplastic panel, CMP, CBC unremarkable, Vit D 39, CBC 2018:  white blood cell count is mildly elevated, the platelet count is still elevated, and there is mild anemia.   CMP normal 10/2018  Vit D normal 10/2018  7/2019 CBC-elevated WBCs and increased platelets.   4/2021 Vitamin D normal    Assessment/Plan: Sandra France is a 36 y.o. female diagnosed with optic neuritis in her right eye in 6/2018 by OCT. Subsequent MRI brain 7/2018 shows multiple chronic demyelinating plaques concerning for Multiple Sclerosis. She has noted some left arm and leg numbness for years, dizziness, fatigue, mood disorder, and urinary symptoms. Imaging and cluster of symptoms are consistent with relapsing and remitting MS given her lesions and symptoms   by time and space    I recommend:   1. She failed Aimovig, Botox, and Zonegran.   -TCA's cannot be used given her concurrent use of two other antidepressant medications and this may worsen her obesity.     -Continue Topamax to 100 mg bid for headache prevention.   -She took this prior for mood and weight loss.     She takes Gabapentin prn for intermittent extremity pain, believed to be MS related. She only takes this qhs prn.     2. She is seeing  Hematology at [facility] for her leukocytosis and thrombocytosis, which is believed to be related to inflammatory causes thus far.     3. PCP managing anxiety. She saw [facility] prior.     She is no longer taking Melatonin for insomnia.  Would avoid TCA's in this patient, given her concurrent use of Wellbutrin and Lexapro and her obesity.     4. She is followed by Dr. Campos/[facility] for her MS exclusively. New lesions found on MRI Brain in 5/2020 after various complaints, and her Copaxone was changed to Ocrevus.   -Previously, she was not given IV steroids at the time of acute ON, but symptoms improved. Labs for MS mimics were normal.   -LP was not done as her symptoms are rather pathognomonic.     5. Continue Vit Supplementation for Vit D deficiency.     6. Urology consulted for nocturia. She is taking medication for this and ordered PT.     7. She stopped smoking since her last visit. She has multiple risk factors for CVA including tobacco use, HTN, and birth control use.  -Note: she is taking Wellbutrin without effect on her smoking status, though this is taken for anxiety and depression.     8. She saw Ramos Shine, PT, prior for pelvic floor therapy. She was referred for this per Urology prior, but had not been able to find a local provider at that time.     9. She is in the process of setting up her new CPAP for her JOHN, after her last machine was recalled. Compliance encouraged.       RTC 1 year

## 2022-05-20 ENCOUNTER — LAB VISIT (OUTPATIENT)
Dept: LAB | Facility: HOSPITAL | Age: 37
End: 2022-05-20
Attending: CLINICAL NURSE SPECIALIST
Payer: COMMERCIAL

## 2022-05-20 DIAGNOSIS — G35 MULTIPLE SCLEROSIS: ICD-10-CM

## 2022-05-20 LAB
25(OH)D3+25(OH)D2 SERPL-MCNC: 69 NG/ML (ref 30–96)
BASOPHILS # BLD AUTO: 0.08 K/UL (ref 0–0.2)
BASOPHILS NFR BLD: 0.6 % (ref 0–1.9)
DIFFERENTIAL METHOD: ABNORMAL
EOSINOPHIL # BLD AUTO: 0.4 K/UL (ref 0–0.5)
EOSINOPHIL NFR BLD: 2.8 % (ref 0–8)
ERYTHROCYTE [DISTWIDTH] IN BLOOD BY AUTOMATED COUNT: 13.2 % (ref 11.5–14.5)
HCT VFR BLD AUTO: 42.5 % (ref 37–48.5)
HGB BLD-MCNC: 14.1 G/DL (ref 12–16)
IGA SERPL-MCNC: 155 MG/DL (ref 40–350)
IGG SERPL-MCNC: 565 MG/DL (ref 650–1600)
IGM SERPL-MCNC: 17 MG/DL (ref 50–300)
IMM GRANULOCYTES # BLD AUTO: 0.08 K/UL (ref 0–0.04)
IMM GRANULOCYTES NFR BLD AUTO: 0.6 % (ref 0–0.5)
LYMPHOCYTES # BLD AUTO: 2.9 K/UL (ref 1–4.8)
LYMPHOCYTES NFR BLD: 20.8 % (ref 18–48)
MCH RBC QN AUTO: 29.3 PG (ref 27–31)
MCHC RBC AUTO-ENTMCNC: 33.2 G/DL (ref 32–36)
MCV RBC AUTO: 88 FL (ref 82–98)
MONOCYTES # BLD AUTO: 1 K/UL (ref 0.3–1)
MONOCYTES NFR BLD: 7 % (ref 4–15)
NEUTROPHILS # BLD AUTO: 9.4 K/UL (ref 1.8–7.7)
NEUTROPHILS NFR BLD: 68.2 % (ref 38–73)
NRBC BLD-RTO: 0 /100 WBC
PLATELET # BLD AUTO: 482 K/UL (ref 150–450)
PMV BLD AUTO: 9.1 FL (ref 9.2–12.9)
RBC # BLD AUTO: 4.82 M/UL (ref 4–5.4)
VIT B12 SERPL-MCNC: 455 PG/ML (ref 210–950)
WBC # BLD AUTO: 13.77 K/UL (ref 3.9–12.7)

## 2022-05-20 PROCEDURE — 82306 VITAMIN D 25 HYDROXY: CPT | Performed by: CLINICAL NURSE SPECIALIST

## 2022-05-20 PROCEDURE — 82784 ASSAY IGA/IGD/IGG/IGM EACH: CPT | Performed by: CLINICAL NURSE SPECIALIST

## 2022-05-20 PROCEDURE — 82607 VITAMIN B-12: CPT | Performed by: CLINICAL NURSE SPECIALIST

## 2022-05-20 PROCEDURE — 86706 HEP B SURFACE ANTIBODY: CPT | Performed by: CLINICAL NURSE SPECIALIST

## 2022-05-20 PROCEDURE — 86704 HEP B CORE ANTIBODY TOTAL: CPT | Performed by: CLINICAL NURSE SPECIALIST

## 2022-05-20 PROCEDURE — 36415 COLL VENOUS BLD VENIPUNCTURE: CPT | Performed by: CLINICAL NURSE SPECIALIST

## 2022-05-20 PROCEDURE — 87340 HEPATITIS B SURFACE AG IA: CPT | Performed by: CLINICAL NURSE SPECIALIST

## 2022-05-20 PROCEDURE — 85025 COMPLETE CBC W/AUTO DIFF WBC: CPT | Performed by: CLINICAL NURSE SPECIALIST

## 2022-05-26 ENCOUNTER — PATIENT MESSAGE (OUTPATIENT)
Dept: NEUROLOGY | Facility: CLINIC | Age: 37
End: 2022-05-26
Payer: COMMERCIAL

## 2022-05-27 NOTE — TELEPHONE ENCOUNTER
----- Message from Kailey Sommers, APRN, CNS sent at 5/17/2022 11:25 AM CDT -----  Labs will be this week. She is flexible on infusion date in late June.

## 2022-05-30 RX ORDER — EPINEPHRINE 0.3 MG/.3ML
0.3 INJECTION SUBCUTANEOUS
Status: CANCELLED | OUTPATIENT
Start: 2022-06-05

## 2022-05-30 RX ORDER — FAMOTIDINE 10 MG/ML
20 INJECTION INTRAVENOUS
Status: CANCELLED | OUTPATIENT
Start: 2022-06-05

## 2022-05-30 RX ORDER — HEPARIN 100 UNIT/ML
500 SYRINGE INTRAVENOUS
Status: CANCELLED | OUTPATIENT
Start: 2022-06-05

## 2022-05-30 RX ORDER — DIPHENHYDRAMINE HYDROCHLORIDE 50 MG/ML
50 INJECTION INTRAMUSCULAR; INTRAVENOUS
Status: CANCELLED | OUTPATIENT
Start: 2022-06-05

## 2022-05-30 RX ORDER — ACETAMINOPHEN 500 MG
1000 TABLET ORAL
Status: CANCELLED | OUTPATIENT
Start: 2022-06-05

## 2022-05-30 RX ORDER — SODIUM CHLORIDE 0.9 % (FLUSH) 0.9 %
10 SYRINGE (ML) INJECTION
Status: CANCELLED | OUTPATIENT
Start: 2022-06-05

## 2022-06-24 ENCOUNTER — PATIENT MESSAGE (OUTPATIENT)
Dept: PSYCHIATRY | Facility: CLINIC | Age: 37
End: 2022-06-24
Payer: COMMERCIAL

## 2022-06-24 ENCOUNTER — INFUSION (OUTPATIENT)
Dept: INFUSION THERAPY | Facility: HOSPITAL | Age: 37
End: 2022-06-24
Attending: PSYCHIATRY & NEUROLOGY
Payer: COMMERCIAL

## 2022-06-24 VITALS
DIASTOLIC BLOOD PRESSURE: 57 MMHG | TEMPERATURE: 98 F | HEART RATE: 90 BPM | OXYGEN SATURATION: 99 % | BODY MASS INDEX: 57.6 KG/M2 | WEIGHT: 285.69 LBS | HEIGHT: 59 IN | RESPIRATION RATE: 18 BRPM | SYSTOLIC BLOOD PRESSURE: 117 MMHG

## 2022-06-24 DIAGNOSIS — G35 MULTIPLE SCLEROSIS: Primary | ICD-10-CM

## 2022-06-24 PROCEDURE — 96413 CHEMO IV INFUSION 1 HR: CPT

## 2022-06-24 PROCEDURE — 63600175 PHARM REV CODE 636 W HCPCS: Performed by: PSYCHIATRY & NEUROLOGY

## 2022-06-24 PROCEDURE — 96415 CHEMO IV INFUSION ADDL HR: CPT

## 2022-06-24 PROCEDURE — 25000003 PHARM REV CODE 250: Performed by: PSYCHIATRY & NEUROLOGY

## 2022-06-24 PROCEDURE — 96375 TX/PRO/DX INJ NEW DRUG ADDON: CPT

## 2022-06-24 PROCEDURE — 96367 TX/PROPH/DG ADDL SEQ IV INF: CPT

## 2022-06-24 RX ORDER — HEPARIN 100 UNIT/ML
500 SYRINGE INTRAVENOUS
Status: CANCELLED | OUTPATIENT
Start: 2022-11-25

## 2022-06-24 RX ORDER — SODIUM CHLORIDE 0.9 % (FLUSH) 0.9 %
10 SYRINGE (ML) INJECTION
Status: DISCONTINUED | OUTPATIENT
Start: 2022-06-24 | End: 2022-06-24 | Stop reason: HOSPADM

## 2022-06-24 RX ORDER — FAMOTIDINE 10 MG/ML
20 INJECTION INTRAVENOUS
Status: COMPLETED | OUTPATIENT
Start: 2022-06-24 | End: 2022-06-24

## 2022-06-24 RX ORDER — ACETAMINOPHEN 500 MG
1000 TABLET ORAL
Status: CANCELLED | OUTPATIENT
Start: 2022-11-25

## 2022-06-24 RX ORDER — SODIUM CHLORIDE 0.9 % (FLUSH) 0.9 %
10 SYRINGE (ML) INJECTION
Status: CANCELLED | OUTPATIENT
Start: 2022-11-25

## 2022-06-24 RX ORDER — HEPARIN 100 UNIT/ML
500 SYRINGE INTRAVENOUS
Status: DISCONTINUED | OUTPATIENT
Start: 2022-06-24 | End: 2022-06-24 | Stop reason: HOSPADM

## 2022-06-24 RX ORDER — EPINEPHRINE 0.3 MG/.3ML
0.3 INJECTION SUBCUTANEOUS
Status: CANCELLED | OUTPATIENT
Start: 2022-11-25

## 2022-06-24 RX ORDER — EPINEPHRINE 0.3 MG/.3ML
0.3 INJECTION SUBCUTANEOUS
Status: DISCONTINUED | OUTPATIENT
Start: 2022-06-24 | End: 2022-06-24 | Stop reason: HOSPADM

## 2022-06-24 RX ORDER — ACETAMINOPHEN 500 MG
1000 TABLET ORAL
Status: COMPLETED | OUTPATIENT
Start: 2022-06-24 | End: 2022-06-24

## 2022-06-24 RX ORDER — DIPHENHYDRAMINE HYDROCHLORIDE 50 MG/ML
50 INJECTION INTRAMUSCULAR; INTRAVENOUS
Status: DISCONTINUED | OUTPATIENT
Start: 2022-06-24 | End: 2022-06-24 | Stop reason: HOSPADM

## 2022-06-24 RX ORDER — FAMOTIDINE 10 MG/ML
20 INJECTION INTRAVENOUS
Status: CANCELLED | OUTPATIENT
Start: 2022-11-25

## 2022-06-24 RX ORDER — DIPHENHYDRAMINE HYDROCHLORIDE 50 MG/ML
50 INJECTION INTRAMUSCULAR; INTRAVENOUS
Status: CANCELLED | OUTPATIENT
Start: 2022-11-25

## 2022-06-24 RX ADMIN — FAMOTIDINE 20 MG: 10 INJECTION, SOLUTION INTRAVENOUS at 08:06

## 2022-06-24 RX ADMIN — OCRELIZUMAB 600 MG: 300 INJECTION INTRAVENOUS at 08:06

## 2022-06-24 RX ADMIN — ACETAMINOPHEN 1000 MG: 500 TABLET ORAL at 07:06

## 2022-06-24 RX ADMIN — DIPHENHYDRAMINE HYDROCHLORIDE 50 MG: 50 INJECTION, SOLUTION INTRAMUSCULAR; INTRAVENOUS at 08:06

## 2022-06-24 RX ADMIN — DEXTROSE: 50 INJECTION, SOLUTION INTRAVENOUS at 07:06

## 2022-06-24 NOTE — PLAN OF CARE
Pt received Ocrevus today and tolerated well, without complications. VSS throughout infusion. Educated patient about Ocrevus (indications, side effects, possible reactions,  precautions) and verbalized understanding. PIV positive for blood return, saline locked and removed prior to DC, catheter tip intact. Pt DC with no distress noted, ambulated off of unit, w/ family, w/o event, pleased. Pt opted to not wait the obs time. No S/S of reactions till that point.

## 2022-10-06 ENCOUNTER — HOSPITAL ENCOUNTER (OUTPATIENT)
Dept: RADIOLOGY | Facility: HOSPITAL | Age: 37
Discharge: HOME | End: 2022-10-06
Attending: CLINICAL NURSE SPECIALIST
Payer: COMMERCIAL

## 2022-10-06 DIAGNOSIS — G35 MULTIPLE SCLEROSIS: ICD-10-CM

## 2022-10-06 PROCEDURE — 70551 MRI BRAIN STEM W/O DYE: CPT | Mod: 26,,, | Performed by: RADIOLOGY

## 2022-10-06 PROCEDURE — 72141 MRI CERVICAL SPINE DEMYELINATING WITHOUT CONTRAST: ICD-10-PCS | Mod: 26,,, | Performed by: RADIOLOGY

## 2022-10-06 PROCEDURE — 70551 MRI BRAIN DEMYELINATING WITHOUT CONTRAST: ICD-10-PCS | Mod: 26,,, | Performed by: RADIOLOGY

## 2022-10-06 PROCEDURE — 72141 MRI NECK SPINE W/O DYE: CPT | Mod: TC

## 2022-10-06 PROCEDURE — 70551 MRI BRAIN STEM W/O DYE: CPT | Mod: TC

## 2022-10-06 PROCEDURE — 72141 MRI NECK SPINE W/O DYE: CPT | Mod: 26,,, | Performed by: RADIOLOGY

## 2022-11-15 ENCOUNTER — OFFICE VISIT (OUTPATIENT)
Dept: NEUROLOGY | Facility: CLINIC | Age: 37
End: 2022-11-15
Payer: COMMERCIAL

## 2022-11-15 ENCOUNTER — LAB VISIT (OUTPATIENT)
Dept: LAB | Facility: HOSPITAL | Age: 37
End: 2022-11-15
Attending: PSYCHIATRY & NEUROLOGY
Payer: COMMERCIAL

## 2022-11-15 VITALS
DIASTOLIC BLOOD PRESSURE: 81 MMHG | WEIGHT: 267.06 LBS | SYSTOLIC BLOOD PRESSURE: 123 MMHG | HEIGHT: 59 IN | BODY MASS INDEX: 53.84 KG/M2 | HEART RATE: 103 BPM

## 2022-11-15 DIAGNOSIS — G35 MULTIPLE SCLEROSIS: Primary | ICD-10-CM

## 2022-11-15 DIAGNOSIS — D84.9 IMMUNOSUPPRESSION: ICD-10-CM

## 2022-11-15 DIAGNOSIS — Z71.89 COUNSELING REGARDING GOALS OF CARE: ICD-10-CM

## 2022-11-15 DIAGNOSIS — Z79.899 HIGH RISK MEDICATION USE: ICD-10-CM

## 2022-11-15 DIAGNOSIS — G35 MULTIPLE SCLEROSIS: ICD-10-CM

## 2022-11-15 LAB
BASOPHILS # BLD AUTO: 0.08 K/UL (ref 0–0.2)
BASOPHILS NFR BLD: 0.6 % (ref 0–1.9)
DIFFERENTIAL METHOD: ABNORMAL
EOSINOPHIL # BLD AUTO: 0.4 K/UL (ref 0–0.5)
EOSINOPHIL NFR BLD: 2.6 % (ref 0–8)
ERYTHROCYTE [DISTWIDTH] IN BLOOD BY AUTOMATED COUNT: 13 % (ref 11.5–14.5)
HBV CORE AB SERPL QL IA: NORMAL
HBV SURFACE AB SER-ACNC: <3 MIU/ML
HBV SURFACE AB SER-ACNC: NORMAL M[IU]/ML
HBV SURFACE AG SERPL QL IA: NORMAL
HCT VFR BLD AUTO: 43.4 % (ref 37–48.5)
HGB BLD-MCNC: 14.5 G/DL (ref 12–16)
IGA SERPL-MCNC: 156 MG/DL (ref 40–350)
IGG SERPL-MCNC: 547 MG/DL (ref 650–1600)
IGM SERPL-MCNC: 15 MG/DL (ref 50–300)
IMM GRANULOCYTES # BLD AUTO: 0.07 K/UL (ref 0–0.04)
IMM GRANULOCYTES NFR BLD AUTO: 0.5 % (ref 0–0.5)
LYMPHOCYTES # BLD AUTO: 2.8 K/UL (ref 1–4.8)
LYMPHOCYTES NFR BLD: 19.5 % (ref 18–48)
MCH RBC QN AUTO: 29.4 PG (ref 27–31)
MCHC RBC AUTO-ENTMCNC: 33.4 G/DL (ref 32–36)
MCV RBC AUTO: 88 FL (ref 82–98)
MONOCYTES # BLD AUTO: 1 K/UL (ref 0.3–1)
MONOCYTES NFR BLD: 6.9 % (ref 4–15)
NEUTROPHILS # BLD AUTO: 9.8 K/UL (ref 1.8–7.7)
NEUTROPHILS NFR BLD: 69.9 % (ref 38–73)
NRBC BLD-RTO: 0 /100 WBC
PLATELET # BLD AUTO: 483 K/UL (ref 150–450)
PMV BLD AUTO: 9.9 FL (ref 9.2–12.9)
RBC # BLD AUTO: 4.93 M/UL (ref 4–5.4)
WBC # BLD AUTO: 14.07 K/UL (ref 3.9–12.7)

## 2022-11-15 PROCEDURE — 99999 PR PBB SHADOW E&M-EST. PATIENT-LVL III: ICD-10-PCS | Mod: PBBFAC,,, | Performed by: PSYCHIATRY & NEUROLOGY

## 2022-11-15 PROCEDURE — 99215 OFFICE O/P EST HI 40 MIN: CPT | Mod: S$GLB,,, | Performed by: PSYCHIATRY & NEUROLOGY

## 2022-11-15 PROCEDURE — 3079F DIAST BP 80-89 MM HG: CPT | Mod: CPTII,S$GLB,, | Performed by: PSYCHIATRY & NEUROLOGY

## 2022-11-15 PROCEDURE — 3008F BODY MASS INDEX DOCD: CPT | Mod: CPTII,S$GLB,, | Performed by: PSYCHIATRY & NEUROLOGY

## 2022-11-15 PROCEDURE — 1159F PR MEDICATION LIST DOCUMENTED IN MEDICAL RECORD: ICD-10-PCS | Mod: CPTII,S$GLB,, | Performed by: PSYCHIATRY & NEUROLOGY

## 2022-11-15 PROCEDURE — 36415 COLL VENOUS BLD VENIPUNCTURE: CPT | Performed by: PSYCHIATRY & NEUROLOGY

## 2022-11-15 PROCEDURE — 4010F ACE/ARB THERAPY RXD/TAKEN: CPT | Mod: CPTII,S$GLB,, | Performed by: PSYCHIATRY & NEUROLOGY

## 2022-11-15 PROCEDURE — 1159F MED LIST DOCD IN RCRD: CPT | Mod: CPTII,S$GLB,, | Performed by: PSYCHIATRY & NEUROLOGY

## 2022-11-15 PROCEDURE — 1160F PR REVIEW ALL MEDS BY PRESCRIBER/CLIN PHARMACIST DOCUMENTED: ICD-10-PCS | Mod: CPTII,S$GLB,, | Performed by: PSYCHIATRY & NEUROLOGY

## 2022-11-15 PROCEDURE — 3079F PR MOST RECENT DIASTOLIC BLOOD PRESSURE 80-89 MM HG: ICD-10-PCS | Mod: CPTII,S$GLB,, | Performed by: PSYCHIATRY & NEUROLOGY

## 2022-11-15 PROCEDURE — 99999 PR PBB SHADOW E&M-EST. PATIENT-LVL III: CPT | Mod: PBBFAC,,, | Performed by: PSYCHIATRY & NEUROLOGY

## 2022-11-15 PROCEDURE — 3074F PR MOST RECENT SYSTOLIC BLOOD PRESSURE < 130 MM HG: ICD-10-PCS | Mod: CPTII,S$GLB,, | Performed by: PSYCHIATRY & NEUROLOGY

## 2022-11-15 PROCEDURE — 4010F PR ACE/ARB THEARPY RXD/TAKEN: ICD-10-PCS | Mod: CPTII,S$GLB,, | Performed by: PSYCHIATRY & NEUROLOGY

## 2022-11-15 PROCEDURE — 86706 HEP B SURFACE ANTIBODY: CPT | Performed by: PSYCHIATRY & NEUROLOGY

## 2022-11-15 PROCEDURE — 82784 ASSAY IGA/IGD/IGG/IGM EACH: CPT | Mod: 59 | Performed by: PSYCHIATRY & NEUROLOGY

## 2022-11-15 PROCEDURE — 3074F SYST BP LT 130 MM HG: CPT | Mod: CPTII,S$GLB,, | Performed by: PSYCHIATRY & NEUROLOGY

## 2022-11-15 PROCEDURE — 85025 COMPLETE CBC W/AUTO DIFF WBC: CPT | Performed by: PSYCHIATRY & NEUROLOGY

## 2022-11-15 PROCEDURE — 87340 HEPATITIS B SURFACE AG IA: CPT | Performed by: PSYCHIATRY & NEUROLOGY

## 2022-11-15 PROCEDURE — 1160F RVW MEDS BY RX/DR IN RCRD: CPT | Mod: CPTII,S$GLB,, | Performed by: PSYCHIATRY & NEUROLOGY

## 2022-11-15 PROCEDURE — 99215 PR OFFICE/OUTPT VISIT, EST, LEVL V, 40-54 MIN: ICD-10-PCS | Mod: S$GLB,,, | Performed by: PSYCHIATRY & NEUROLOGY

## 2022-11-15 PROCEDURE — 86704 HEP B CORE ANTIBODY TOTAL: CPT | Performed by: PSYCHIATRY & NEUROLOGY

## 2022-11-15 PROCEDURE — 3008F PR BODY MASS INDEX (BMI) DOCUMENTED: ICD-10-PCS | Mod: CPTII,S$GLB,, | Performed by: PSYCHIATRY & NEUROLOGY

## 2022-11-15 RX ORDER — ELAGOLIX 150 MG/1
1 TABLET, FILM COATED ORAL DAILY
COMMUNITY
Start: 2022-10-12 | End: 2023-10-16

## 2022-11-15 RX ORDER — TIRZEPATIDE 5 MG/.5ML
INJECTION, SOLUTION SUBCUTANEOUS
COMMUNITY
Start: 2022-10-20 | End: 2023-05-16

## 2022-11-15 NOTE — PROGRESS NOTES
Subjective:          Patient ID: Sandra France is a 37 y.o. female who presents today for a routine clinic visit for MS.      MS HPI:  DMT: ocrelizumab  Side effects from DMT? No  Taking vitamin D3 as recommended? Yes - 5,000 IU five days/ week   Feeling stable;   No sense of relapse; no sense of progressive decline   Continues to have fatigue --it's stable;  not worse;   She's trying to lose weight; lost 27 pounds over the past 8 weeks since starting Mounjao injections weekly.     Planning to start exercising soon.   She has a CPAP, but does not use it.   On Topamax for HA   She's had Moderna series and at least one booster     Medications:  Current Outpatient Medications   Medication Sig    buPROPion (WELLBUTRIN XL) 300 MG 24 hr tablet Take 1 tablet (300 mg total) by mouth once daily.    ergocalciferol, vitamin D2, (VITAMIN D ORAL) Take 10,000 Units by mouth once daily. 5 days a week    EScitalopram oxalate (LEXAPRO) 20 MG tablet Take 20 mg by mouth once daily.    gabapentin (NEURONTIN) 100 MG capsule Take 1 capsule (100 mg total) by mouth nightly as needed.    ibuprofen (ADVIL,MOTRIN) 200 MG tablet Take 800 mg by mouth daily as needed for Pain.    levothyroxine (SYNTHROID) 75 MCG tablet Take 75 mcg by mouth before breakfast.     liothyronine (CYTOMEL) 5 MCG Tab Take 10 mcg by mouth once daily.     lisinopril 10 MG tablet Take 10 mg by mouth once daily.    MOUNJARO 5 mg/0.5 mL PnIj SMARTSI.5 Milliliter(s) SUB-Q Once a Week    omeprazole/sodium bicarbonate (ZEGERID OTC ORAL) Take 1 tablet by mouth 2 (two) times daily as needed.     ORILISSA 150 mg Tab Take 1 tablet by mouth once daily.    topiramate (TOPAMAX) 100 MG tablet Take 1 tablet (100 mg total) by mouth 2 (two) times daily.     No current facility-administered medications for this visit.       SOCIAL HISTORY  Social History     Tobacco Use    Smoking status: Former     Packs/day: 0.25     Years: 12.00     Pack years: 3.00     Types: Cigarettes      Start date: 8/17/2020    Smokeless tobacco: Never   Substance Use Topics    Alcohol use: No    Drug use: Never       Living arrangements - the patient lives with their family.  and 17 y/o stepson.  She does not plan to have kids         REVIEW OF SYMPTOMS 11/15/2022   Do you feel abnormally tired on most days? Yes   Do you feel you generally sleep well? No--some insomnia   Do you have difficulty controlling your bladder?  Yes -mild   Do you have difficulty controlling your bowels?  No   Do you have frequent muscle cramps, tightness or spasms in your limbs?  No   Do you have new visual symptoms?  No   Do you have worsening difficulty with your memory or thinking? No   Do you have worsening symptoms of anxiety or depression?  No   For patients who walk, Do you have more difficulty walking?  Not Applicable   Have you fallen since your last visit?  No   For patients who use wheelchairs: Do you have any skin wounds or breakdown? Not Applicable   Do you have difficulty using your hands?  No   Do you have shooting or burning pain? No   Do you have difficulty with sexual function?  No   If you are sexually active, are you using birth control? Y/N  N/A Yes --IUD   Do you often choke when swallowing liquids or solid food?  No   Do you experience worsening symptoms when overheated? Yes   Do you need any new equipment such as a wheelchair, walker or shower chair? No   Do you receive co-pay financial assistance for your principal MS medicine? Yes   Would you be interested in participating in an MS research trial in the future? Yes   For patients on Gilenya, Tecfidera, Aubagio, Rituxan, Ocrevus, Tysabri, Lemtrada or Methotrexate, are you aware that you should NOT receive live virus vaccines?  Yes   Do you feel you have adequate family/friend support?  Yes   Do you have health insurance?   Yes   Are you currently employed? Yes   Do you receive SSDI/SSI?  Not Applicable   Do you use marijuana or cannabis products? No   Have  you been diagnosed with a urinary tract infection since your last visit here? No   Have you been diagnosed with a respiratory tract infection since your last visit here? No   Have you been to the emergency room since your last visit here? No   Have you been hospitalized since your last visit here?  No            Objective:        Timed 25 Foot Walk: 11/16/2021 11/15/2022   Did patient wear an AFO? No No   Was assistive device used? No No   Time for 25 Foot Walk (seconds) 4.18 4.2   Time for 25 Foot Walk (seconds) 4.53 -     Neurologic Exam    MENTAL STATUS: grossly intact  CRANIAL NERVE EXAM: There is no internuclear ophthalmoplegia. Extraocular   muscles are intact.  No facial   asymmetry.There is no dysarthria.   MOTOR EXAM: Normal bulk and tone throughout UE and LE bilaterally. Rapid sequential movements are normal. Strength is 5/5 in all groups   in the lower extremities and upper extremities.   REFLEXES: Symmetric and 1+ throughout in all 4 extremities.   SENSORY EXAM: Normal to LT t/o  COORDINATION: Normal finger-to-nose exam.   GAIT: Narrow based and stable.      Imaging:     Results for orders placed during the hospital encounter of 10/06/22    MRI Brain Demyelinating Without Contrast    Impression  Brain and cervical spinal cord appear stable from prior exams, again demonstrating findings compatible with the reported history of multiple sclerosis. No new discrete lesions to indicate ongoing demyelination.      Electronically signed by: Michael Sesay MD  Date:    10/06/2022  Time:    12:03    Results for orders placed during the hospital encounter of 10/06/22    MRI Cervical Spine Demyelinating Without Contrast    Impression  Brain and cervical spinal cord appear stable from prior exams, again demonstrating findings compatible with the reported history of multiple sclerosis. No new discrete lesions to indicate ongoing demyelination.      Electronically signed by: Michael Sesay  MD  Date:    10/06/2022  Time:    12:03    No results found for this or any previous visit.    Results for orders placed during the hospital encounter of 10/14/21    MRI Brain Demyelinating W W/O Contrast    Impression  Brain appears stable from prior exam, again demonstrating findings compatible with the reported history of multiple sclerosis.  No new or enhancing lesions to indicate ongoing or active demyelination.    Electronically signed by resident: Prema Russ  Date:    10/15/2021  Time:    08:06    Electronically signed by: Michael Sesay MD  Date:    10/15/2021  Time:    13:02    Results for orders placed during the hospital encounter of 09/30/19    MRI Cervical Spine Demyelinating W W/O Contrast    Impression  Subtle short-segment STIR signal hyperintensity cervical spinal cord dorsally at the C6 vertebral body level which may be artifactual without corresponding signal abnormality on additional sequences.  Sequela of prior demyelination to be considered in light of history.    No evidence for additional edema signal throughout the remaining cervicothoracic cord.  No abnormal intrathecal enhancement.    Mild degenerative changes as detailed above      Electronically signed by: Edward Elena DO  Date:    09/30/2019  Time:    11:13    Results for orders placed during the hospital encounter of 09/30/19    MRI Thoracic Spine Demyelinating W W/O Contrast    Impression  Subtle short-segment STIR signal hyperintensity cervical spinal cord dorsally at the C6 vertebral body level which may be artifactual without corresponding signal abnormality on additional sequences.  Sequela of prior demyelination to be considered in light of history.    No evidence for additional edema signal throughout the remaining cervicothoracic cord.  No abnormal intrathecal enhancement.    Mild degenerative changes as detailed above      Electronically signed by: Edward Elena DO  Date:    09/30/2019  Time:    11:13        Labs:     Lab  Results   Component Value Date    [id number] 69 05/20/2022    [id number] 71 04/20/2021    YVHGNUXB36AD 34 05/14/2020     Lab Results   Component Value Date    JCVINDEX 0.12 05/14/2020    JCVANTIBODY Negative 05/14/2020     Lab Results   Component Value Date    CR9NRDUI 81.0 05/14/2020    ABSOLUTECD3 5009.0 (H) 05/14/2020    MC5GDIJX 21.9 05/14/2020    ABSOLUTECD8 1351.0 (H) 05/14/2020    YM1LDIWF 58.7 (H) 05/14/2020    ABSOLUTECD4 3629.0 (H) 05/14/2020    LABCD48 2.69 05/14/2020     Lab Results   Component Value Date    WBC 13.77 (H) 05/20/2022    RBC 4.82 05/20/2022    HGB 14.1 05/20/2022    HCT 42.5 05/20/2022    MCV 88 05/20/2022    MCH 29.3 05/20/2022    MCHC 33.2 05/20/2022    RDW 13.2 05/20/2022     (H) 05/20/2022    MPV 9.1 (L) 05/20/2022    GRAN 9.4 (H) 05/20/2022    GRAN 68.2 05/20/2022    LYMPH 2.9 05/20/2022    LYMPH 20.8 05/20/2022    MONO 1.0 05/20/2022    MONO 7.0 05/20/2022    EOS 0.4 05/20/2022    BASO 0.08 05/20/2022    EOSINOPHIL 2.8 05/20/2022    BASOPHIL 0.6 05/20/2022     Sodium   Date Value Ref Range Status   05/14/2020 137 136 - 145 mmol/L Final     Potassium   Date Value Ref Range Status   05/14/2020 3.7 3.5 - 5.1 mmol/L Final     Chloride   Date Value Ref Range Status   05/14/2020 104 95 - 110 mmol/L Final     CO2   Date Value Ref Range Status   05/14/2020 24 23 - 29 mmol/L Final     Glucose   Date Value Ref Range Status   05/14/2020 96 70 - 110 mg/dL Final     BUN   Date Value Ref Range Status   05/14/2020 24 (H) 6 - 20 mg/dL Final     Creatinine   Date Value Ref Range Status   05/14/2020 0.7 0.5 - 1.4 mg/dL Final     Calcium   Date Value Ref Range Status   05/14/2020 8.4 (L) 8.7 - 10.5 mg/dL Final     Total Protein   Date Value Ref Range Status   05/14/2020 6.2 6.0 - 8.4 g/dL Final     Albumin   Date Value Ref Range Status   05/14/2020 3.3 (L) 3.5 - 5.2 g/dL Final     Total Bilirubin   Date Value Ref Range Status   05/14/2020 0.8 0.1 - 1.0 mg/dL Final     Comment:     For  infants and newborns, interpretation of results should be based  on gestational age, weight and in agreement with clinical  observations.  Premature Infant recommended reference ranges:  Up to 24 hours.............<8.0 mg/dL  Up to 48 hours............<12.0 mg/dL  3-5 days..................<15.0 mg/dL  6-29 days.................<15.0 mg/dL       Alkaline Phosphatase   Date Value Ref Range Status   05/14/2020 56 55 - 135 U/L Final     AST   Date Value Ref Range Status   05/14/2020 7 (L) 10 - 40 U/L Final     ALT   Date Value Ref Range Status   05/14/2020 20 10 - 44 U/L Final     Anion Gap   Date Value Ref Range Status   05/14/2020 9 8 - 16 mmol/L Final     eGFR if    Date Value Ref Range Status   05/14/2020 >60 >60 mL/min/1.73 m^2 Final     eGFR if non    Date Value Ref Range Status   05/14/2020 >60 >60 mL/min/1.73 m^2 Final     Comment:     Calculation used to obtain the estimated glomerular filtration  rate (eGFR) is the CKD-EPI equation.        Lab Results   Component Value Date    HEPBSAG Negative 05/20/2022    HEPBSAB Negative 05/20/2022    HEPBCAB Negative 05/20/2022           MS Impression and Plan:     NEURO MULTIPLE SCLEROSIS IMPRESSION:   MS Status:     Number of relapses in the past year?:  0    Clinical Progression:  Clinically Stable    MRI Progression:  Stable  Plan:     DMT:  No change in management    Symptom Management:  No change in symptom management     DALIA  Labs today   Ocrevus December  F/u 6 mo [facility]        Problem List Items Addressed This Visit          Unprioritized    Multiple sclerosis - Primary    Relevant Orders    CBC Auto Differential    Immunoglobulins (IgG, IgA, IgM) Quantitative    Hepatitis B Core Antibody, Total    Hepatitis B Surface Ab, Qualitative    Hepatitis B Surface Antigen    Immunosuppression     Other Visit Diagnoses       Counseling regarding goals of care        High risk medication use                Nel Campos,  MD DAMIAN spent a total of 40 minutes on the day of the visit.This includes face to face time and non-face to face time preparing to see the patient (eg, review of tests), obtaining and/or reviewing separately obtained history, documenting clinical information in the electronic or other health record, independently interpreting results and communicating results to the patient/family/caregiver, or care coordinator.

## 2022-11-15 NOTE — Clinical Note
On track for December infusion; she goes to [facility], but is open to outpatient infusion center; labs today :-)

## 2022-11-30 ENCOUNTER — PATIENT MESSAGE (OUTPATIENT)
Dept: NEUROLOGY | Facility: CLINIC | Age: 37
End: 2022-11-30
Payer: COMMERCIAL

## 2022-12-01 ENCOUNTER — PATIENT MESSAGE (OUTPATIENT)
Dept: PSYCHIATRY | Facility: CLINIC | Age: 37
End: 2022-12-01
Payer: COMMERCIAL

## 2022-12-01 ENCOUNTER — TELEPHONE (OUTPATIENT)
Dept: NEUROLOGY | Facility: CLINIC | Age: 37
End: 2022-12-01
Payer: COMMERCIAL

## 2022-12-01 NOTE — TELEPHONE ENCOUNTER
----- Message from Nel Campos MD sent at 11/15/2022 11:30 AM CST -----  On track for December infusion; she goes to [facility], but is open to outpatient infusion center; labs today :-)

## 2022-12-22 ENCOUNTER — INFUSION (OUTPATIENT)
Dept: INFUSION THERAPY | Facility: HOSPITAL | Age: 37
End: 2022-12-22
Payer: COMMERCIAL

## 2022-12-22 VITALS
DIASTOLIC BLOOD PRESSURE: 59 MMHG | WEIGHT: 263.88 LBS | BODY MASS INDEX: 53.2 KG/M2 | RESPIRATION RATE: 18 BRPM | SYSTOLIC BLOOD PRESSURE: 116 MMHG | HEIGHT: 59 IN | HEART RATE: 83 BPM | TEMPERATURE: 98 F

## 2022-12-22 DIAGNOSIS — G35 MULTIPLE SCLEROSIS: Primary | ICD-10-CM

## 2022-12-22 PROCEDURE — 96375 TX/PRO/DX INJ NEW DRUG ADDON: CPT

## 2022-12-22 PROCEDURE — 25000003 PHARM REV CODE 250: Performed by: PSYCHIATRY & NEUROLOGY

## 2022-12-22 PROCEDURE — 96366 THER/PROPH/DIAG IV INF ADDON: CPT

## 2022-12-22 PROCEDURE — 96367 TX/PROPH/DG ADDL SEQ IV INF: CPT

## 2022-12-22 PROCEDURE — 63600175 PHARM REV CODE 636 W HCPCS: Performed by: PSYCHIATRY & NEUROLOGY

## 2022-12-22 PROCEDURE — 96365 THER/PROPH/DIAG IV INF INIT: CPT

## 2022-12-22 RX ORDER — SODIUM CHLORIDE 0.9 % (FLUSH) 0.9 %
10 SYRINGE (ML) INJECTION
Status: CANCELLED | OUTPATIENT
Start: 2023-05-25

## 2022-12-22 RX ORDER — HEPARIN 100 UNIT/ML
500 SYRINGE INTRAVENOUS
Status: CANCELLED | OUTPATIENT
Start: 2023-05-25

## 2022-12-22 RX ORDER — FAMOTIDINE 10 MG/ML
20 INJECTION INTRAVENOUS
Status: COMPLETED | OUTPATIENT
Start: 2022-12-22 | End: 2022-12-22

## 2022-12-22 RX ORDER — EPINEPHRINE 0.3 MG/.3ML
0.3 INJECTION SUBCUTANEOUS
Status: DISCONTINUED | OUTPATIENT
Start: 2022-12-22 | End: 2022-12-22 | Stop reason: HOSPADM

## 2022-12-22 RX ORDER — EPINEPHRINE 0.3 MG/.3ML
0.3 INJECTION SUBCUTANEOUS
Status: CANCELLED | OUTPATIENT
Start: 2023-05-25

## 2022-12-22 RX ORDER — FAMOTIDINE 10 MG/ML
20 INJECTION INTRAVENOUS
Status: CANCELLED | OUTPATIENT
Start: 2023-05-25

## 2022-12-22 RX ORDER — DIPHENHYDRAMINE HYDROCHLORIDE 50 MG/ML
50 INJECTION INTRAMUSCULAR; INTRAVENOUS
Status: CANCELLED | OUTPATIENT
Start: 2023-05-25

## 2022-12-22 RX ORDER — DIPHENHYDRAMINE HYDROCHLORIDE 50 MG/ML
50 INJECTION INTRAMUSCULAR; INTRAVENOUS
Status: DISCONTINUED | OUTPATIENT
Start: 2022-12-22 | End: 2022-12-22 | Stop reason: HOSPADM

## 2022-12-22 RX ORDER — SODIUM CHLORIDE 0.9 % (FLUSH) 0.9 %
10 SYRINGE (ML) INJECTION
Status: DISCONTINUED | OUTPATIENT
Start: 2022-12-22 | End: 2022-12-22 | Stop reason: HOSPADM

## 2022-12-22 RX ORDER — ACETAMINOPHEN 500 MG
1000 TABLET ORAL
Status: CANCELLED | OUTPATIENT
Start: 2023-05-25

## 2022-12-22 RX ORDER — ACETAMINOPHEN 500 MG
1000 TABLET ORAL
Status: COMPLETED | OUTPATIENT
Start: 2022-12-22 | End: 2022-12-22

## 2022-12-22 RX ADMIN — DEXTROSE: 50 INJECTION, SOLUTION INTRAVENOUS at 08:12

## 2022-12-22 RX ADMIN — OCRELIZUMAB 600 MG: 300 INJECTION INTRAVENOUS at 09:12

## 2022-12-22 RX ADMIN — DIPHENHYDRAMINE HYDROCHLORIDE 50 MG: 50 INJECTION, SOLUTION INTRAMUSCULAR; INTRAVENOUS at 08:12

## 2022-12-22 RX ADMIN — ACETAMINOPHEN 1000 MG: 500 TABLET ORAL at 08:12

## 2022-12-22 RX ADMIN — SODIUM CHLORIDE: 9 INJECTION, SOLUTION INTRAVENOUS at 08:12

## 2022-12-22 RX ADMIN — FAMOTIDINE 20 MG: 10 INJECTION INTRAVENOUS at 09:12

## 2022-12-22 NOTE — PLAN OF CARE
[id number]  Infusion completed, pt tolerated well; pt declined post infusion observation, states she has no hx of post infusion reaction; instructed to remain well hydrated, reviewed s/s of post infusion reaction, how to treat, when to contact MD, when to report to ER; next appt not yet scheduled; pt and mother verbalized understanding of all discussed

## 2022-12-22 NOTE — NURSING
0755  Pt here for Ocrevus infusion, accompanied by mother, no new complaints or concerns, reports tolerating prior treatments, declines post-observation period, states she has no hx of post infusion issues; discussed treatment plan for today, all questions answered and pt agrees to proceed

## 2023-02-20 ENCOUNTER — PATIENT MESSAGE (OUTPATIENT)
Dept: PSYCHIATRY | Facility: CLINIC | Age: 38
End: 2023-02-20
Payer: COMMERCIAL

## 2023-05-16 ENCOUNTER — OFFICE VISIT (OUTPATIENT)
Dept: NEUROLOGY | Facility: CLINIC | Age: 38
End: 2023-05-16
Payer: COMMERCIAL

## 2023-05-16 DIAGNOSIS — Z29.89 PROPHYLACTIC IMMUNOTHERAPY: ICD-10-CM

## 2023-05-16 DIAGNOSIS — M62.838 MUSCLE SPASM: ICD-10-CM

## 2023-05-16 DIAGNOSIS — G35 MULTIPLE SCLEROSIS: Primary | ICD-10-CM

## 2023-05-16 DIAGNOSIS — Z79.899 HIGH RISK MEDICATION USE: ICD-10-CM

## 2023-05-16 DIAGNOSIS — Z71.89 COUNSELING REGARDING GOALS OF CARE: ICD-10-CM

## 2023-05-16 PROCEDURE — 99213 OFFICE O/P EST LOW 20 MIN: CPT | Mod: 95,,, | Performed by: CLINICAL NURSE SPECIALIST

## 2023-05-16 PROCEDURE — 4010F PR ACE/ARB THEARPY RXD/TAKEN: ICD-10-PCS | Mod: CPTII,95,, | Performed by: CLINICAL NURSE SPECIALIST

## 2023-05-16 PROCEDURE — 1159F PR MEDICATION LIST DOCUMENTED IN MEDICAL RECORD: ICD-10-PCS | Mod: CPTII,95,, | Performed by: CLINICAL NURSE SPECIALIST

## 2023-05-16 PROCEDURE — 99213 PR OFFICE/OUTPT VISIT, EST, LEVL III, 20-29 MIN: ICD-10-PCS | Mod: 95,,, | Performed by: CLINICAL NURSE SPECIALIST

## 2023-05-16 PROCEDURE — 1159F MED LIST DOCD IN RCRD: CPT | Mod: CPTII,95,, | Performed by: CLINICAL NURSE SPECIALIST

## 2023-05-16 PROCEDURE — 4010F ACE/ARB THERAPY RXD/TAKEN: CPT | Mod: CPTII,95,, | Performed by: CLINICAL NURSE SPECIALIST

## 2023-05-16 NOTE — PROGRESS NOTES
Subjective:          Patient ID: Sandra France is a 37 y.o. female who presents today for a routine virtual visit for MS.  She was last seen in November 2022. The history has been provided by the patient.     The patient location is: her home  The chief complaint leading to consultation is: MS     Visit type: audiovisual    Face to Face time with patient: 17 minutes   22 minutes of total time spent on the encounter, which includes face to face time and non-face to face time preparing to see the patient (eg, review of tests), Obtaining and/or reviewing separately obtained history, Documenting clinical information in the electronic or other health record, Independently interpreting results (not separately reported) and communicating results to the patient/family/caregiver, or Care coordination (not separately reported).       Each patient to whom he or she provides medical services by telemedicine is:  (1) informed of the relationship between the physician and patient and the respective role of any other health care provider with respect to management of the patient; and (2) notified that he or she may decline to receive medical services by telemedicine and may withdraw from such care at any time.      MS HPI:  DMT: Ocrevus--last infused in December; due next in June   Side effects from DMT? No  Taking vitamin D3 as recommended? 10,000 units 5 days a week   Since the last visit, she feels like she is getting more muscle spasms/cramping than she used to. These are not daily. They happen mostly in her legs and after a long day. She did not find magnesium to be helpful. She feels like she is staying hydrated, drinking about 64oz per day.   Fatigue is the same--still not great.   She denies any signs of relapse.   If she is under stress, she notices that her word finding is a bit worse. She also finds that she repeats herself more often at times. She thinks she may be losing her train of thought. Job performance is  good.   She is not exercising. She had been on Mounjaro and had lost 45lbs. The drug has become too expensive for her though.   She denies any recent infections.     Medications:  Current Outpatient Medications   Medication Sig    buPROPion (WELLBUTRIN XL) 300 MG 24 hr tablet Take 1 tablet (300 mg total) by mouth once daily.    ergocalciferol, vitamin D2, (VITAMIN D ORAL) Take 10,000 Units by mouth once daily. 5 days a week    EScitalopram oxalate (LEXAPRO) 20 MG tablet Take 20 mg by mouth once daily.    gabapentin (NEURONTIN) 100 MG capsule Take 1 capsule (100 mg total) by mouth nightly as needed.    ibuprofen (ADVIL,MOTRIN) 200 MG tablet Take 800 mg by mouth daily as needed for Pain.    levothyroxine (SYNTHROID) 75 MCG tablet Take 75 mcg by mouth before breakfast.     liothyronine (CYTOMEL) 5 MCG Tab Take 10 mcg by mouth once daily.     lisinopril 10 MG tablet Take 10 mg by mouth once daily.    MOUNJARO 5 mg/0.5 mL PnIj SMARTSI.5 Milliliter(s) SUB-Q Once a Week    omeprazole/sodium bicarbonate (ZEGERID OTC ORAL) Take 1 tablet by mouth 2 (two) times daily as needed.     ORILISSA 150 mg Tab Take 1 tablet by mouth once daily.    topiramate (TOPAMAX) 100 MG tablet Take 1 tablet (100 mg total) by mouth 2 (two) times daily.       SOCIAL HISTORY  Social History     Tobacco Use    Smoking status: Former     Packs/day: 0.25     Years: 12.00     Pack years: 3.00     Types: Cigarettes     Start date: 2020    Smokeless tobacco: Never   Substance Use Topics    Alcohol use: No    Drug use: Never       Living arrangements - the patient lives with her family     ROS:  REVIEW OF SYMPTOMS 23    Do you feel abnormally tired on most days? Yes   Do you feel you generally sleep well? No--could be better; she does not wear CPAP consistently    Do you have difficulty controlling your bladder?  Yes   Do you have difficulty controlling your bowels?  No   Do you have frequent muscle cramps, tightness or spasms in your limbs?   Yes   Do you have new visual symptoms?  No   Do you have worsening difficulty with your memory or thinking? No--word finding, as above    Do you have worsening symptoms of anxiety or depression?  No--feels stable    For patients who walk, Do you have more difficulty walking?  Not Applicable   Have you fallen since your last visit?  No   For patients who use wheelchairs: Do you have any skin wounds or breakdown? Not Applicable   Do you have difficulty using your hands?  No   Do you have shooting or burning pain? Not regularly; sometimes has tingling in feet and nerve pain in her legs when she is tired or has been active    Do you have difficulty with sexual function?  No   If you are sexually active, are you using birth control? Y/N  N/A Yes   Do you often choke when swallowing liquids or solid food?  No   Do you experience worsening symptoms when overheated? Yes--tries to avoid the heat this summer; she has a cooling vest    Do you need any new equipment such as a wheelchair, walker or shower chair? No   Do you receive co-pay financial assistance for your principal MS medicine? Yes   Would you be interested in participating in an MS research trial in the future? Yes   For patients on Gilenya, Tecfidera, Aubagio, Rituxan, Ocrevus, Tysabri, Lemtrada or Methotrexate, are you aware that you should NOT receive live virus vaccines?  Yes   Do you feel you have adequate family/friend support?  Yes   Do you have health insurance?   Yes   Are you currently employed? Yes   Do you receive SSDI/SSI?  Not Applicable   Do you use marijuana or cannabis products? No   Have you been diagnosed with a urinary tract infection since your last visit here? No   Have you been diagnosed with a respiratory tract infection since your last visit here? No   Have you been to the emergency room since your last visit here? No   Have you been hospitalized since your last visit here?  No            Objective:        1. 25 foot timed walk:  Timed 25  Foot Walk: 11/16/2021 11/15/2022   Did patient wear an AFO? No No   Was assistive device used? No No   Time for 25 Foot Walk (seconds) 4.18 4.2   Time for 25 Foot Walk (seconds) 4.53 -       Neurologic Exam    Deferred   Imaging:     Results for orders placed during the hospital encounter of 10/06/22    MRI Brain Demyelinating Without Contrast    Impression  Brain and cervical spinal cord appear stable from prior exams, again demonstrating findings compatible with the reported history of multiple sclerosis. No new discrete lesions to indicate ongoing demyelination.      Electronically signed by: Michael Sesay MD  Date:    10/06/2022  Time:    12:03        Labs:     Lab Results   Component Value Date    [id number] 69 05/20/2022    [id number] 71 04/20/2021    [id number] 34 05/14/2020         Lab Results   Component Value Date    WBC 14.07 (H) 11/15/2022    RBC 4.93 11/15/2022    HGB 14.5 11/15/2022    HCT 43.4 11/15/2022    MCV 88 11/15/2022    MCH 29.4 11/15/2022    MCHC 33.4 11/15/2022    RDW 13.0 11/15/2022     (H) 11/15/2022    MPV 9.9 11/15/2022    GRAN 9.8 (H) 11/15/2022    GRAN 69.9 11/15/2022    LYMPH 2.8 11/15/2022    LYMPH 19.5 11/15/2022    MONO 1.0 11/15/2022    MONO 6.9 11/15/2022    EOS 0.4 11/15/2022    BASO 0.08 11/15/2022    EOSINOPHIL 2.6 11/15/2022    BASOPHIL 0.6 11/15/2022     Sodium   Date Value Ref Range Status   05/14/2020 137 136 - 145 mmol/L Final     Potassium   Date Value Ref Range Status   05/14/2020 3.7 3.5 - 5.1 mmol/L Final     Chloride   Date Value Ref Range Status   05/14/2020 104 95 - 110 mmol/L Final     CO2   Date Value Ref Range Status   05/14/2020 24 23 - 29 mmol/L Final     Glucose   Date Value Ref Range Status   05/14/2020 96 70 - 110 mg/dL Final     BUN   Date Value Ref Range Status   05/14/2020 24 (H) 6 - 20 mg/dL Final     Creatinine   Date Value Ref Range Status   05/14/2020 0.7 0.5 - 1.4 mg/dL Final     Calcium   Date Value Ref Range Status   05/14/2020 8.4  (L) 8.7 - 10.5 mg/dL Final     Total Protein   Date Value Ref Range Status   05/14/2020 6.2 6.0 - 8.4 g/dL Final     Albumin   Date Value Ref Range Status   05/14/2020 3.3 (L) 3.5 - 5.2 g/dL Final     Total Bilirubin   Date Value Ref Range Status   05/14/2020 0.8 0.1 - 1.0 mg/dL Final     Comment:     For infants and newborns, interpretation of results should be based  on gestational age, weight and in agreement with clinical  observations.  Premature Infant recommended reference ranges:  Up to 24 hours.............<8.0 mg/dL  Up to 48 hours............<12.0 mg/dL  3-5 days..................<15.0 mg/dL  6-29 days.................<15.0 mg/dL       Alkaline Phosphatase   Date Value Ref Range Status   05/14/2020 56 55 - 135 U/L Final     AST   Date Value Ref Range Status   05/14/2020 7 (L) 10 - 40 U/L Final     ALT   Date Value Ref Range Status   05/14/2020 20 10 - 44 U/L Final     Anion Gap   Date Value Ref Range Status   05/14/2020 9 8 - 16 mmol/L Final     eGFR if    Date Value Ref Range Status   05/14/2020 >60 >60 mL/min/1.73 m^2 Final     eGFR if non    Date Value Ref Range Status   05/14/2020 >60 >60 mL/min/1.73 m^2 Final     Comment:     Calculation used to obtain the estimated glomerular filtration  rate (eGFR) is the CKD-EPI equation.        Lab Results   Component Value Date    HEPBSAG Non-reactive 11/15/2022    HEPBSAB <3.00 11/15/2022    HEPBSAB Non-reactive 11/15/2022    HEPBCAB Non-reactive 11/15/2022           MS Impression and Plan:     NEURO MULTIPLE SCLEROSIS IMPRESSION:   MS Status:     Number of relapses in the past year?:  0    Clinical Progression:  Clinically Stable  Plan:     DMT:  No change in management    DMT comment:  Continue Ocrevus and vitamin D. Her infusion is due in June. Safety labs are planned for later this week. She is aware of the risks associated with immunosuppressant therapy, including increased risk of infection.       Symptom Management:   Implement change in symptom management    Implement Change in Symptom Management:  Spasticity (Discussed low dose muscle relaxer, but she defers for now.)       MRI brain is due in October.   She will follow up in clinic after the MRI.     Labs this Thursday or Friday at [facility]   Defers muscle relaxer right now         NICHELLE Navarro, CNS

## 2023-05-18 ENCOUNTER — PATIENT MESSAGE (OUTPATIENT)
Dept: NEUROLOGY | Facility: CLINIC | Age: 38
End: 2023-05-18
Payer: COMMERCIAL

## 2023-05-18 ENCOUNTER — TELEPHONE (OUTPATIENT)
Dept: NEUROLOGY | Facility: CLINIC | Age: 38
End: 2023-05-18
Payer: COMMERCIAL

## 2023-05-18 NOTE — TELEPHONE ENCOUNTER
LVM telling pt to call us back to schedule labs and matilda MRI in October. Will also send pt a message via the portal.

## 2023-05-18 NOTE — TELEPHONE ENCOUNTER
----- Message from NICHELLE Jimenez, CNS sent at 5/16/2023  1:26 PM CDT -----  Labs Thursday or Friday this week at [facility]   MRI brain in October F/U in clinic after MRI

## 2023-05-22 ENCOUNTER — LAB VISIT (OUTPATIENT)
Dept: LAB | Facility: HOSPITAL | Age: 38
End: 2023-05-22
Attending: CLINICAL NURSE SPECIALIST
Payer: COMMERCIAL

## 2023-05-22 DIAGNOSIS — G35 MULTIPLE SCLEROSIS: ICD-10-CM

## 2023-05-22 LAB
ALBUMIN SERPL BCP-MCNC: 3.9 G/DL (ref 3.5–5.2)
ALP SERPL-CCNC: 91 U/L (ref 55–135)
ALT SERPL W/O P-5'-P-CCNC: 16 U/L (ref 10–44)
ANION GAP SERPL CALC-SCNC: 9 MMOL/L (ref 8–16)
AST SERPL-CCNC: 10 U/L (ref 10–40)
BASOPHILS # BLD AUTO: 0.09 K/UL (ref 0–0.2)
BASOPHILS NFR BLD: 0.6 % (ref 0–1.9)
BILIRUB SERPL-MCNC: 0.4 MG/DL (ref 0.1–1)
BUN SERPL-MCNC: 15 MG/DL (ref 6–20)
CALCIUM SERPL-MCNC: 9.5 MG/DL (ref 8.7–10.5)
CHLORIDE SERPL-SCNC: 108 MMOL/L (ref 95–110)
CO2 SERPL-SCNC: 23 MMOL/L (ref 23–29)
CREAT SERPL-MCNC: 0.8 MG/DL (ref 0.5–1.4)
DIFFERENTIAL METHOD: ABNORMAL
EOSINOPHIL # BLD AUTO: 0.4 K/UL (ref 0–0.5)
EOSINOPHIL NFR BLD: 2.6 % (ref 0–8)
ERYTHROCYTE [DISTWIDTH] IN BLOOD BY AUTOMATED COUNT: 13.3 % (ref 11.5–14.5)
EST. GFR  (NO RACE VARIABLE): >60 ML/MIN/1.73 M^2
GLUCOSE SERPL-MCNC: 86 MG/DL (ref 70–110)
HCT VFR BLD AUTO: 42.3 % (ref 37–48.5)
HGB BLD-MCNC: 13.7 G/DL (ref 12–16)
IGA SERPL-MCNC: 140 MG/DL (ref 40–350)
IGG SERPL-MCNC: 503 MG/DL (ref 650–1600)
IGM SERPL-MCNC: 14 MG/DL (ref 50–300)
IMM GRANULOCYTES # BLD AUTO: 0.08 K/UL (ref 0–0.04)
IMM GRANULOCYTES NFR BLD AUTO: 0.5 % (ref 0–0.5)
LYMPHOCYTES # BLD AUTO: 3.8 K/UL (ref 1–4.8)
LYMPHOCYTES NFR BLD: 25.8 % (ref 18–48)
MCH RBC QN AUTO: 29.1 PG (ref 27–31)
MCHC RBC AUTO-ENTMCNC: 32.4 G/DL (ref 32–36)
MCV RBC AUTO: 90 FL (ref 82–98)
MONOCYTES # BLD AUTO: 0.9 K/UL (ref 0.3–1)
MONOCYTES NFR BLD: 6.4 % (ref 4–15)
NEUTROPHILS # BLD AUTO: 9.4 K/UL (ref 1.8–7.7)
NEUTROPHILS NFR BLD: 64.1 % (ref 38–73)
NRBC BLD-RTO: 0 /100 WBC
PLATELET # BLD AUTO: 442 K/UL (ref 150–450)
PMV BLD AUTO: 9.3 FL (ref 9.2–12.9)
POTASSIUM SERPL-SCNC: 4.1 MMOL/L (ref 3.5–5.1)
PROT SERPL-MCNC: 6.9 G/DL (ref 6–8.4)
RBC # BLD AUTO: 4.7 M/UL (ref 4–5.4)
SODIUM SERPL-SCNC: 140 MMOL/L (ref 136–145)
WBC # BLD AUTO: 14.63 K/UL (ref 3.9–12.7)

## 2023-05-22 PROCEDURE — 87340 HEPATITIS B SURFACE AG IA: CPT | Performed by: CLINICAL NURSE SPECIALIST

## 2023-05-22 PROCEDURE — 80053 COMPREHEN METABOLIC PANEL: CPT | Performed by: CLINICAL NURSE SPECIALIST

## 2023-05-22 PROCEDURE — 82306 VITAMIN D 25 HYDROXY: CPT | Performed by: CLINICAL NURSE SPECIALIST

## 2023-05-22 PROCEDURE — 82784 ASSAY IGA/IGD/IGG/IGM EACH: CPT | Performed by: CLINICAL NURSE SPECIALIST

## 2023-05-22 PROCEDURE — 36415 COLL VENOUS BLD VENIPUNCTURE: CPT | Performed by: CLINICAL NURSE SPECIALIST

## 2023-05-22 PROCEDURE — 85025 COMPLETE CBC W/AUTO DIFF WBC: CPT | Performed by: CLINICAL NURSE SPECIALIST

## 2023-05-22 PROCEDURE — 86704 HEP B CORE ANTIBODY TOTAL: CPT | Performed by: CLINICAL NURSE SPECIALIST

## 2023-05-23 LAB
25(OH)D3+25(OH)D2 SERPL-MCNC: 80 NG/ML (ref 30–96)
HBV CORE AB SERPL QL IA: NORMAL
HBV SURFACE AG SERPL QL IA: NORMAL

## 2023-05-24 ENCOUNTER — PATIENT MESSAGE (OUTPATIENT)
Dept: NEUROLOGY | Facility: CLINIC | Age: 38
End: 2023-05-24
Payer: COMMERCIAL

## 2023-05-30 ENCOUNTER — TELEPHONE (OUTPATIENT)
Dept: NEUROLOGY | Facility: CLINIC | Age: 38
End: 2023-05-30
Payer: COMMERCIAL

## 2023-05-30 NOTE — TELEPHONE ENCOUNTER
----- Message from NICHELLE Jimenez, CNS sent at 5/26/2023  1:28 PM CDT -----  Low immunoglobulins; does not get frequent infections   Elevated WBC and ANC; has seen hematology  Negative hep B labs   Normal CMP  Stable Vit D     She can proceed with infusion.

## 2023-06-13 NOTE — TELEPHONE ENCOUNTER
----- Message from Isabelle Larios sent at 7/31/2019  1:16 PM CDT -----  Contact: pt @ [phone]  Calling to schedule an appt with Dr. Campos. Please call.   Klisyri Pregnancy And Lactation Text: It is unknown if this medication can harm a developing fetus or if it is excreted in breast milk.

## 2023-06-30 ENCOUNTER — TELEPHONE (OUTPATIENT)
Dept: NEUROLOGY | Facility: CLINIC | Age: 38
End: 2023-06-30

## 2023-06-30 DIAGNOSIS — G35 MULTIPLE SCLEROSIS: Primary | ICD-10-CM

## 2023-06-30 NOTE — TELEPHONE ENCOUNTER
----- Message from Yfn Cr PharmD sent at 6/29/2023  3:01 PM CDT -----  Regarding: RE: Ocrevus Infusion  BB, AP, and SP,     Please see below message. It will be hard to determine how much Ocrevus she did not receive. Maybe test B-cells earlier?       ----- Message -----  From: Lázaro Rooney PharmD  Sent: 6/28/2023   4:43 PM CDT  To: Jarrett Dotson PharmD, Krysta Paniagua RN, #  Subject: Ocrevus Infusion                                 Isa Coulter,  just wanted to let yall know about a minor infusion hiccup we had this morning for Ms. France's Ocrevus.  Patient noticed a little puddle of water on the ground a little after the infusion started.  Our RN assessed the lines and noticed that the line from the ocrevus bag going to the y-site was a little loose and fixed the connection.  Patient received the remainder of the bag and did not seem worried about the amount that was lost on the ground.

## 2023-07-21 ENCOUNTER — PATIENT MESSAGE (OUTPATIENT)
Dept: PSYCHIATRY | Facility: CLINIC | Age: 38
End: 2023-07-21
Payer: COMMERCIAL

## 2023-08-21 DIAGNOSIS — G35 MULTIPLE SCLEROSIS: ICD-10-CM

## 2023-08-23 RX ORDER — GABAPENTIN 100 MG/1
100 CAPSULE ORAL NIGHTLY PRN
Qty: 30 CAPSULE | Refills: 3 | Status: SHIPPED | OUTPATIENT
Start: 2023-08-23 | End: 2024-08-22

## 2023-10-04 ENCOUNTER — PATIENT MESSAGE (OUTPATIENT)
Dept: NEUROLOGY | Facility: CLINIC | Age: 38
End: 2023-10-04
Payer: COMMERCIAL

## 2023-10-16 ENCOUNTER — OFFICE VISIT (OUTPATIENT)
Dept: NEUROLOGY | Facility: CLINIC | Age: 38
End: 2023-10-16
Payer: COMMERCIAL

## 2023-10-16 VITALS
BODY MASS INDEX: 52.49 KG/M2 | WEIGHT: 260.38 LBS | HEART RATE: 120 BPM | HEIGHT: 59 IN | DIASTOLIC BLOOD PRESSURE: 86 MMHG | SYSTOLIC BLOOD PRESSURE: 130 MMHG

## 2023-10-16 DIAGNOSIS — Z71.89 COUNSELING REGARDING GOALS OF CARE: ICD-10-CM

## 2023-10-16 DIAGNOSIS — D84.9 IMMUNOSUPPRESSION: ICD-10-CM

## 2023-10-16 DIAGNOSIS — Z29.89 PROPHYLACTIC IMMUNOTHERAPY: ICD-10-CM

## 2023-10-16 DIAGNOSIS — G35 MULTIPLE SCLEROSIS: ICD-10-CM

## 2023-10-16 DIAGNOSIS — G35 MS (MULTIPLE SCLEROSIS): Primary | ICD-10-CM

## 2023-10-16 PROCEDURE — 3079F PR MOST RECENT DIASTOLIC BLOOD PRESSURE 80-89 MM HG: ICD-10-PCS | Mod: CPTII,S$GLB,, | Performed by: PSYCHIATRY & NEUROLOGY

## 2023-10-16 PROCEDURE — 1160F PR REVIEW ALL MEDS BY PRESCRIBER/CLIN PHARMACIST DOCUMENTED: ICD-10-PCS | Mod: CPTII,S$GLB,, | Performed by: PSYCHIATRY & NEUROLOGY

## 2023-10-16 PROCEDURE — 4010F PR ACE/ARB THEARPY RXD/TAKEN: ICD-10-PCS | Mod: CPTII,S$GLB,, | Performed by: PSYCHIATRY & NEUROLOGY

## 2023-10-16 PROCEDURE — 99215 OFFICE O/P EST HI 40 MIN: CPT | Mod: S$GLB,,, | Performed by: PSYCHIATRY & NEUROLOGY

## 2023-10-16 PROCEDURE — 99999 PR PBB SHADOW E&M-EST. PATIENT-LVL IV: CPT | Mod: PBBFAC,,, | Performed by: PSYCHIATRY & NEUROLOGY

## 2023-10-16 PROCEDURE — 99215 PR OFFICE/OUTPT VISIT, EST, LEVL V, 40-54 MIN: ICD-10-PCS | Mod: S$GLB,,, | Performed by: PSYCHIATRY & NEUROLOGY

## 2023-10-16 PROCEDURE — 4010F ACE/ARB THERAPY RXD/TAKEN: CPT | Mod: CPTII,S$GLB,, | Performed by: PSYCHIATRY & NEUROLOGY

## 2023-10-16 PROCEDURE — 3075F SYST BP GE 130 - 139MM HG: CPT | Mod: CPTII,S$GLB,, | Performed by: PSYCHIATRY & NEUROLOGY

## 2023-10-16 PROCEDURE — 3008F PR BODY MASS INDEX (BMI) DOCUMENTED: ICD-10-PCS | Mod: CPTII,S$GLB,, | Performed by: PSYCHIATRY & NEUROLOGY

## 2023-10-16 PROCEDURE — 3079F DIAST BP 80-89 MM HG: CPT | Mod: CPTII,S$GLB,, | Performed by: PSYCHIATRY & NEUROLOGY

## 2023-10-16 PROCEDURE — 3008F BODY MASS INDEX DOCD: CPT | Mod: CPTII,S$GLB,, | Performed by: PSYCHIATRY & NEUROLOGY

## 2023-10-16 PROCEDURE — 99999 PR PBB SHADOW E&M-EST. PATIENT-LVL IV: ICD-10-PCS | Mod: PBBFAC,,, | Performed by: PSYCHIATRY & NEUROLOGY

## 2023-10-16 PROCEDURE — 1160F RVW MEDS BY RX/DR IN RCRD: CPT | Mod: CPTII,S$GLB,, | Performed by: PSYCHIATRY & NEUROLOGY

## 2023-10-16 PROCEDURE — 3075F PR MOST RECENT SYSTOLIC BLOOD PRESS GE 130-139MM HG: ICD-10-PCS | Mod: CPTII,S$GLB,, | Performed by: PSYCHIATRY & NEUROLOGY

## 2023-10-16 PROCEDURE — 1159F MED LIST DOCD IN RCRD: CPT | Mod: CPTII,S$GLB,, | Performed by: PSYCHIATRY & NEUROLOGY

## 2023-10-16 PROCEDURE — 1159F PR MEDICATION LIST DOCUMENTED IN MEDICAL RECORD: ICD-10-PCS | Mod: CPTII,S$GLB,, | Performed by: PSYCHIATRY & NEUROLOGY

## 2023-10-16 RX ORDER — LEVONORGESTREL AND ETHINYL ESTRADIOL AND ETHINYL ESTRADIOL 150-30(84)
1 KIT ORAL NIGHTLY
COMMUNITY
Start: 2023-10-15

## 2023-10-16 RX ORDER — SULFAMETHOXAZOLE AND TRIMETHOPRIM 800; 160 MG/1; MG/1
1 TABLET ORAL 2 TIMES DAILY
Status: ON HOLD | COMMUNITY
Start: 2023-10-09 | End: 2024-03-07 | Stop reason: HOSPADM

## 2023-10-16 RX ORDER — ALBUTEROL SULFATE 1.25 MG/3ML
SOLUTION RESPIRATORY (INHALATION)
Status: ON HOLD | COMMUNITY
Start: 2023-10-12 | End: 2024-03-07 | Stop reason: HOSPADM

## 2023-10-16 NOTE — Clinical Note
Yfn -- changing pt to teriflunomide;  will start that medication in December (when she would be due for next Ocrevus).  Will check labs in mid-December ;  please send teriflunomide to [facility] once those labs result; I've scheduled her monthly labs x 6 mo today as well.  Pt's email address is [email or web address]  thanks

## 2023-10-16 NOTE — PROGRESS NOTES
Subjective:          Patient ID: Sandra France is a 38 y.o. female who presents today for a routine clinic visit for MS.      MS HPI:  DMT: ocrelizumab - last dose was June  Side effects from DMT? Yes - infection / URI   Taking vitamin D3 as recommended? Yes -    Has had URI for 7 weeks;  Currently on 3rd course of antibiotics with only slight improvement. She has a productive cough, nasal congestion, sore throat, no fever; managed by her GP. She may be referred to ID.   She's been on Ocrevus since 2020  She has Mirena IUD and oral OCP (b/c of suspected endometriosis).  Does not plan to have kids.   Neurologically she is stable, and has been so since starting Ocrevus in 2020    Medications:  Current Outpatient Medications   Medication Sig    albuterol (ACCUNEB) 1.25 mg/3 mL Nebu Take by nebulization every 4 to 6 hours as needed.    ASHLYNA 0.15 mg-30 mcg (84)/10 mcg (7) 3MPk Take by mouth.    buPROPion (WELLBUTRIN XL) 300 MG 24 hr tablet Take 1 tablet (300 mg total) by mouth once daily.    ergocalciferol, vitamin D2, (VITAMIN D ORAL) Take 10,000 Units by mouth once daily. 5 days a week    EScitalopram oxalate (LEXAPRO) 20 MG tablet Take 20 mg by mouth once daily.    gabapentin (NEURONTIN) 100 MG capsule Take 1 capsule (100 mg total) by mouth nightly as needed.    ibuprofen (ADVIL,MOTRIN) 200 MG tablet Take 800 mg by mouth daily as needed for Pain.    levothyroxine (SYNTHROID) 75 MCG tablet Take 75 mcg by mouth before breakfast.     liothyronine (CYTOMEL) 5 MCG Tab Take 10 mcg by mouth once daily.     lisinopril 10 MG tablet Take 10 mg by mouth once daily.    omeprazole/sodium bicarbonate (ZEGERID OTC ORAL) Take 1 tablet by mouth 2 (two) times daily as needed.     sulfamethoxazole-trimethoprim 800-160mg (BACTRIM DS) 800-160 mg Tab Take 1 tablet by mouth 2 (two) times daily.    topiramate (TOPAMAX) 100 MG tablet Take 1 tablet (100 mg total) by mouth 2 (two) times daily. (Patient taking differently: Take 100  mg by mouth once daily.)     No current facility-administered medications for this visit.       SOCIAL HISTORY  Social History     Tobacco Use    Smoking status: Former     Current packs/day: 0.25     Average packs/day: 0.2 packs/day for 12.0 years (3.0 ttl pk-yrs)     Types: Cigarettes     Start date: 8/17/2020    Smokeless tobacco: Never   Substance Use Topics    Alcohol use: No    Drug use: Never       Living arrangements - the patient lives with their spouse.    ROS:        10/15/2023     8:03 AM   REVIEW OF SYMPTOMS   Do you feel abnormally tired on most days? Yes   Do you feel you generally sleep well? No   Do you have difficulty controlling your bladder?  Yes   Do you have difficulty controlling your bowels?  No   Do you have frequent muscle cramps, tightness or spasms in your limbs?  No   Do you have new visual symptoms?  No   Do you have worsening difficulty with your memory or thinking? No   Do you have worsening symptoms of anxiety or depression?  No   For patients who walk, Do you have more difficulty walking?  Not Applicable   Have you fallen since your last visit?  No   For patients who use wheelchairs: Do you have any skin wounds or breakdown? Not Applicable   Do you have difficulty using your hands?  No   Do you have shooting or burning pain? No   Do you have difficulty with sexual function?  No   If you are sexually active, are you using birth control? Y/N  N/A Yes   Do you often choke when swallowing liquids or solid food?  No   Do you experience worsening symptoms when overheated? Yes   Do you need any new equipment such as a wheelchair, walker or shower chair? No   Do you receive co-pay financial assistance for your principal MS medicine? Yes   Would you be interested in participating in an MS research trial in the future? Yes   For patients on Gilenya, Tecfidera, Aubagio, Rituxan, Ocrevus, Tysabri, Lemtrada or Methotrexate, are you aware that you should NOT receive live virus vaccines?  Yes   Do  you feel you have adequate family/friend support?  Yes   Do you have health insurance?   Yes   Are you currently employed? Yes   Do you receive SSDI/SSI?  Not Applicable   Do you use marijuana or cannabis products? No   Have you been diagnosed with a urinary tract infection since your last visit here? No   Have you been diagnosed with a respiratory tract infection since your last visit here? Yes   Have you been to the emergency room since your last visit here? No   Have you been hospitalized since your last visit here?  No                Objective:            11/15/2022    12:01 AM 10/16/2023    12:01 AM   Timed 25 Foot Walk:   Did patient wear an AFO? No No   Was assistive device used? No No   Time for 25 Foot Walk (seconds) 4.2 4.5   Time for 25 Foot Walk (seconds)  4.4         Neurologic Exam  MENTAL STATUS: grossly intact  CRANIAL NERVE EXAM: There is no internuclear ophthalmoplegia. Extraocular   muscles are intact.  No facial   asymmetry.There is no dysarthria.   MOTOR EXAM: Normal bulk and tone throughout UE and LE bilaterally. Rapid sequential movements are normal. Strength is 5/5 in all groups   in the lower extremities and upper extremities.   REFLEXES: Symmetric and 1+ throughout in all 4 extremities.   SENSORY EXAM: Normal to LT t/o  COORDINATION: Normal finger-to-nose exam.   GAIT: Narrow based and stable.    Imaging:     Results for orders placed during the hospital encounter of 10/06/22    MRI Brain Demyelinating Without Contrast    Impression  Brain and cervical spinal cord appear stable from prior exams, again demonstrating findings compatible with the reported history of multiple sclerosis. No new discrete lesions to indicate ongoing demyelination.      Electronically signed by: Michael Sesay MD  Date:    10/06/2022  Time:    12:03    Results for orders placed during the hospital encounter of 10/06/22    MRI Cervical Spine Demyelinating Without Contrast    Impression  Brain and cervical spinal  cord appear stable from prior exams, again demonstrating findings compatible with the reported history of multiple sclerosis. No new discrete lesions to indicate ongoing demyelination.      Electronically signed by: Michael Sesay MD  Date:    10/06/2022  Time:    12:03    No results found for this or any previous visit.    Results for orders placed during the hospital encounter of 10/14/21    MRI Brain Demyelinating W W/O Contrast    Impression  Brain appears stable from prior exam, again demonstrating findings compatible with the reported history of multiple sclerosis.  No new or enhancing lesions to indicate ongoing or active demyelination.    Electronically signed by resident: Prema Russ  Date:    10/15/2021  Time:    08:06    Electronically signed by: Michael Sesay MD  Date:    10/15/2021  Time:    13:02    Results for orders placed during the hospital encounter of 09/30/19    MRI Cervical Spine Demyelinating W W/O Contrast    Impression  Subtle short-segment STIR signal hyperintensity cervical spinal cord dorsally at the C6 vertebral body level which may be artifactual without corresponding signal abnormality on additional sequences.  Sequela of prior demyelination to be considered in light of history.    No evidence for additional edema signal throughout the remaining cervicothoracic cord.  No abnormal intrathecal enhancement.    Mild degenerative changes as detailed above      Electronically signed by: Edward Elena DO  Date:    09/30/2019  Time:    11:13    Results for orders placed during the hospital encounter of 09/30/19    MRI Thoracic Spine Demyelinating W W/O Contrast    Impression  Subtle short-segment STIR signal hyperintensity cervical spinal cord dorsally at the C6 vertebral body level which may be artifactual without corresponding signal abnormality on additional sequences.  Sequela of prior demyelination to be considered in light of history.    No evidence for additional edema signal  throughout the remaining cervicothoracic cord.  No abnormal intrathecal enhancement.    Mild degenerative changes as detailed above      Electronically signed by: Edward Elena DO  Date:    09/30/2019  Time:    11:13        Labs:     Lab Results   Component Value Date    WLMKPSKY97DT 80 05/22/2023    ZKZVEPDV62LW 69 05/20/2022    GDKAMONA69UV 71 04/20/2021     Lab Results   Component Value Date    JCVINDEX 0.12 05/14/2020    JCVANTIBODY Negative 05/14/2020     Lab Results   Component Value Date    OV4XOMSI 81.0 05/14/2020    ABSOLUTECD3 5009.0 (H) 05/14/2020    SX6ZDHER 21.9 05/14/2020    ABSOLUTECD8 1351.0 (H) 05/14/2020    TH4ZDXEA 58.7 (H) 05/14/2020    ABSOLUTECD4 3629.0 (H) 05/14/2020    LABCD48 2.69 05/14/2020     Lab Results   Component Value Date    WBC 14.63 (H) 05/22/2023    RBC 4.70 05/22/2023    HGB 13.7 05/22/2023    HCT 42.3 05/22/2023    MCV 90 05/22/2023    MCH 29.1 05/22/2023    MCHC 32.4 05/22/2023    RDW 13.3 05/22/2023     05/22/2023    MPV 9.3 05/22/2023    GRAN 9.4 (H) 05/22/2023    GRAN 64.1 05/22/2023    LYMPH 3.8 05/22/2023    LYMPH 25.8 05/22/2023    MONO 0.9 05/22/2023    MONO 6.4 05/22/2023    EOS 0.4 05/22/2023    BASO 0.09 05/22/2023    EOSINOPHIL 2.6 05/22/2023    BASOPHIL 0.6 05/22/2023     Sodium   Date Value Ref Range Status   05/22/2023 140 136 - 145 mmol/L Final     Potassium   Date Value Ref Range Status   05/22/2023 4.1 3.5 - 5.1 mmol/L Final     Chloride   Date Value Ref Range Status   05/22/2023 108 95 - 110 mmol/L Final     CO2   Date Value Ref Range Status   05/22/2023 23 23 - 29 mmol/L Final     Glucose   Date Value Ref Range Status   05/22/2023 86 70 - 110 mg/dL Final     BUN   Date Value Ref Range Status   05/22/2023 15 6 - 20 mg/dL Final     Creatinine   Date Value Ref Range Status   05/22/2023 0.8 0.5 - 1.4 mg/dL Final     Calcium   Date Value Ref Range Status   05/22/2023 9.5 8.7 - 10.5 mg/dL Final     Total Protein   Date Value Ref Range Status   05/22/2023 6.9  6.0 - 8.4 g/dL Final     Albumin   Date Value Ref Range Status   05/22/2023 3.9 3.5 - 5.2 g/dL Final     Total Bilirubin   Date Value Ref Range Status   05/22/2023 0.4 0.1 - 1.0 mg/dL Final     Comment:     For infants and newborns, interpretation of results should be based  on gestational age, weight and in agreement with clinical  observations.    Premature Infant recommended reference ranges:  Up to 24 hours.............<8.0 mg/dL  Up to 48 hours............<12.0 mg/dL  3-5 days..................<15.0 mg/dL  6-29 days.................<15.0 mg/dL       Alkaline Phosphatase   Date Value Ref Range Status   05/22/2023 91 55 - 135 U/L Final     AST   Date Value Ref Range Status   05/22/2023 10 10 - 40 U/L Final     ALT   Date Value Ref Range Status   05/22/2023 16 10 - 44 U/L Final     Anion Gap   Date Value Ref Range Status   05/22/2023 9 8 - 16 mmol/L Final     eGFR if    Date Value Ref Range Status   05/14/2020 >60 >60 mL/min/1.73 m^2 Final     eGFR if non    Date Value Ref Range Status   05/14/2020 >60 >60 mL/min/1.73 m^2 Final     Comment:     Calculation used to obtain the estimated glomerular filtration  rate (eGFR) is the CKD-EPI equation.        Lab Results   Component Value Date    HEPBSAG Non-reactive 05/22/2023    HEPBSAB <3.00 11/15/2022    HEPBSAB Non-reactive 11/15/2022    HEPBCAB Non-reactive 05/22/2023           MS Impression and Plan:     NEURO MULTIPLE SCLEROSIS IMPRESSION:   MS Status:     Number of relapses in the past year?:  0    Clinical Progression:  Clinically Stable    MRI Progression:  Stable  Plan:     DMT:  Switch Disease Modifying therapy    DMT comment:  Pt has low IgG and low IgM, and has developed a difficult to treat URI for past 7 weeks.  After shared decision making, we will de-risk and change DMT to teriflunomide to start in December  Will check baseline labs in December and then check monthly x 6 mo;   The rationale, side effects, risks and  expectations of this medication were discussed and patient expressed understanding.  [facility] PharmD Yfn Cr will coordinate initiation of this immunotherapy.        Symptom Management:  No change in symptom management     Discontinue Ocrevus   F/u [facility] in Feb            Problem List Items Addressed This Visit          Unprioritized    Multiple sclerosis    Immunosuppression    Relevant Orders    CBC Auto Differential    Quantiferon Gold TB    Hepatic Function Panel     Other Visit Diagnoses       MS (multiple sclerosis)    -  Primary    Relevant Orders    CBC Auto Differential    Quantiferon Gold TB    Hepatic Function Panel    Prophylactic immunotherapy        Counseling regarding goals of care                Nel Campos MD    I spent a total of 40 minutes on the day of the visit.This includes face to face time and non-face to face time preparing to see the patient (eg, review of tests), obtaining and/or reviewing separately obtained history, documenting clinical information in the electronic or other health record, independently interpreting results and communicating results to the patient/family/caregiver, or care coordinator.

## 2023-10-16 NOTE — PATIENT INSTRUCTIONS
Create account at [email or web address] web site using your e-mail,   2.  MS Center PharmD Yfn Cr will coordinate initiation of teriflunomide (Aubagio)

## 2023-10-24 PROBLEM — N80.9 ENDOMETRIOSIS: Status: ACTIVE | Noted: 2023-10-24

## 2023-10-25 ENCOUNTER — TELEPHONE (OUTPATIENT)
Dept: NEUROLOGY | Facility: CLINIC | Age: 38
End: 2023-10-25
Payer: COMMERCIAL

## 2023-10-25 DIAGNOSIS — G35 MS (MULTIPLE SCLEROSIS): Primary | ICD-10-CM

## 2023-10-25 NOTE — TELEPHONE ENCOUNTER
----- Message from Nel Campos MD sent at 10/16/2023  9:50 AM CDT -----  Yfn -- changing pt to teriflunomide;  will start that medication in December (when she would be due for next Ocrevus).  Will check labs in mid-December ;  please send teriflunomide to [facility] once those labs result; I've scheduled her monthly labs x 6 mo today as well.  Pt's email address is [email or web address]   thanks

## 2023-11-08 ENCOUNTER — PATIENT MESSAGE (OUTPATIENT)
Dept: INFECTIOUS DISEASES | Facility: CLINIC | Age: 38
End: 2023-11-08
Payer: COMMERCIAL

## 2023-11-09 ENCOUNTER — TELEPHONE (OUTPATIENT)
Dept: NEUROLOGY | Facility: CLINIC | Age: 38
End: 2023-11-09
Payer: COMMERCIAL

## 2023-12-14 ENCOUNTER — LAB VISIT (OUTPATIENT)
Dept: LAB | Facility: HOSPITAL | Age: 38
End: 2023-12-14
Attending: PSYCHIATRY & NEUROLOGY
Payer: COMMERCIAL

## 2023-12-14 DIAGNOSIS — D84.9 IMMUNOSUPPRESSION: ICD-10-CM

## 2023-12-14 DIAGNOSIS — G35 MS (MULTIPLE SCLEROSIS): ICD-10-CM

## 2023-12-14 LAB
ALBUMIN SERPL BCP-MCNC: 3.7 G/DL (ref 3.5–5.2)
ALP SERPL-CCNC: 66 U/L (ref 55–135)
ALT SERPL W/O P-5'-P-CCNC: 41 U/L (ref 10–44)
AST SERPL-CCNC: 20 U/L (ref 10–40)
BASOPHILS # BLD AUTO: 0.08 K/UL (ref 0–0.2)
BASOPHILS NFR BLD: 0.8 % (ref 0–1.9)
BILIRUB DIRECT SERPL-MCNC: 0.2 MG/DL (ref 0.1–0.3)
BILIRUB SERPL-MCNC: 0.4 MG/DL (ref 0.1–1)
DIFFERENTIAL METHOD: NORMAL
EOSINOPHIL # BLD AUTO: 0.3 K/UL (ref 0–0.5)
EOSINOPHIL NFR BLD: 3 % (ref 0–8)
ERYTHROCYTE [DISTWIDTH] IN BLOOD BY AUTOMATED COUNT: 13.4 % (ref 11.5–14.5)
HCT VFR BLD AUTO: 41.6 % (ref 37–48.5)
HGB BLD-MCNC: 13.7 G/DL (ref 12–16)
IMM GRANULOCYTES # BLD AUTO: 0.04 K/UL (ref 0–0.04)
IMM GRANULOCYTES NFR BLD AUTO: 0.4 % (ref 0–0.5)
LYMPHOCYTES # BLD AUTO: 2.1 K/UL (ref 1–4.8)
LYMPHOCYTES NFR BLD: 20.4 % (ref 18–48)
MCH RBC QN AUTO: 29.5 PG (ref 27–31)
MCHC RBC AUTO-ENTMCNC: 32.9 G/DL (ref 32–36)
MCV RBC AUTO: 90 FL (ref 82–98)
MONOCYTES # BLD AUTO: 0.7 K/UL (ref 0.3–1)
MONOCYTES NFR BLD: 7.4 % (ref 4–15)
NEUTROPHILS # BLD AUTO: 6.8 K/UL (ref 1.8–7.7)
NEUTROPHILS NFR BLD: 68 % (ref 38–73)
NRBC BLD-RTO: 0 /100 WBC
PLATELET # BLD AUTO: 438 K/UL (ref 150–450)
PMV BLD AUTO: 9.2 FL (ref 9.2–12.9)
PROT SERPL-MCNC: 6.5 G/DL (ref 6–8.4)
RBC # BLD AUTO: 4.65 M/UL (ref 4–5.4)
WBC # BLD AUTO: 10.05 K/UL (ref 3.9–12.7)

## 2023-12-14 PROCEDURE — 36415 COLL VENOUS BLD VENIPUNCTURE: CPT | Performed by: PSYCHIATRY & NEUROLOGY

## 2023-12-14 PROCEDURE — 85025 COMPLETE CBC W/AUTO DIFF WBC: CPT | Performed by: PSYCHIATRY & NEUROLOGY

## 2023-12-14 PROCEDURE — 86480 TB TEST CELL IMMUN MEASURE: CPT | Performed by: PSYCHIATRY & NEUROLOGY

## 2023-12-14 PROCEDURE — 80076 HEPATIC FUNCTION PANEL: CPT | Performed by: PSYCHIATRY & NEUROLOGY

## 2023-12-15 ENCOUNTER — HOSPITAL ENCOUNTER (OUTPATIENT)
Dept: RADIOLOGY | Facility: HOSPITAL | Age: 38
Discharge: HOME | End: 2023-12-15
Attending: CLINICAL NURSE SPECIALIST
Payer: COMMERCIAL

## 2023-12-15 DIAGNOSIS — G35 MULTIPLE SCLEROSIS: ICD-10-CM

## 2023-12-15 LAB
GAMMA INTERFERON BACKGROUND BLD IA-ACNC: 0.06 IU/ML
M TB IFN-G CD4+ BCKGRND COR BLD-ACNC: -0.01 IU/ML
M TB IFN-G CD4+ BCKGRND COR BLD-ACNC: -0.02 IU/ML
MITOGEN IGNF BCKGRD COR BLD-ACNC: 9.94 IU/ML
TB GOLD PLUS: NEGATIVE

## 2023-12-15 PROCEDURE — 70551 MRI BRAIN STEM W/O DYE: CPT | Mod: TC

## 2023-12-15 PROCEDURE — 70551 MRI BRAIN STEM W/O DYE: CPT | Mod: 26,,, | Performed by: RADIOLOGY

## 2023-12-15 PROCEDURE — 70551 MRI BRAIN DEMYELINATING WITHOUT CONTRAST: ICD-10-PCS | Mod: 26,,, | Performed by: RADIOLOGY

## 2023-12-15 RX ORDER — TERIFLUNOMIDE 14 MG/1
14 TABLET, FILM COATED ORAL DAILY
Qty: 90 TABLET | Refills: 0 | Status: ON HOLD | OUTPATIENT
Start: 2023-12-15 | End: 2024-03-16

## 2024-01-22 ENCOUNTER — PATIENT MESSAGE (OUTPATIENT)
Dept: PSYCHIATRY | Facility: CLINIC | Age: 39
End: 2024-01-22
Payer: COMMERCIAL

## 2024-01-25 ENCOUNTER — LAB VISIT (OUTPATIENT)
Dept: LAB | Facility: HOSPITAL | Age: 39
End: 2024-01-25
Attending: PSYCHIATRY & NEUROLOGY
Payer: COMMERCIAL

## 2024-01-25 DIAGNOSIS — G35 MS (MULTIPLE SCLEROSIS): ICD-10-CM

## 2024-01-25 DIAGNOSIS — D84.9 IMMUNOSUPPRESSION: ICD-10-CM

## 2024-01-25 LAB
ALBUMIN SERPL BCP-MCNC: 3.2 G/DL (ref 3.5–5.2)
ALP SERPL-CCNC: 73 U/L (ref 55–135)
ALT SERPL W/O P-5'-P-CCNC: 22 U/L (ref 10–44)
AST SERPL-CCNC: 11 U/L (ref 10–40)
BASOPHILS # BLD AUTO: 0.11 K/UL (ref 0–0.2)
BASOPHILS NFR BLD: 0.7 % (ref 0–1.9)
BILIRUB DIRECT SERPL-MCNC: 0.1 MG/DL (ref 0.1–0.3)
BILIRUB SERPL-MCNC: 0.2 MG/DL (ref 0.1–1)
DIFFERENTIAL METHOD BLD: ABNORMAL
EOSINOPHIL # BLD AUTO: 0.3 K/UL (ref 0–0.5)
EOSINOPHIL NFR BLD: 1.7 % (ref 0–8)
ERYTHROCYTE [DISTWIDTH] IN BLOOD BY AUTOMATED COUNT: 12.7 % (ref 11.5–14.5)
HCT VFR BLD AUTO: 41.4 % (ref 37–48.5)
HGB BLD-MCNC: 13.6 G/DL (ref 12–16)
IMM GRANULOCYTES # BLD AUTO: 0.09 K/UL (ref 0–0.04)
IMM GRANULOCYTES NFR BLD AUTO: 0.5 % (ref 0–0.5)
LYMPHOCYTES # BLD AUTO: 3 K/UL (ref 1–4.8)
LYMPHOCYTES NFR BLD: 17.9 % (ref 18–48)
MCH RBC QN AUTO: 29 PG (ref 27–31)
MCHC RBC AUTO-ENTMCNC: 32.9 G/DL (ref 32–36)
MCV RBC AUTO: 88 FL (ref 82–98)
MONOCYTES # BLD AUTO: 1.1 K/UL (ref 0.3–1)
MONOCYTES NFR BLD: 6.7 % (ref 4–15)
NEUTROPHILS # BLD AUTO: 12.2 K/UL (ref 1.8–7.7)
NEUTROPHILS NFR BLD: 72.5 % (ref 38–73)
NRBC BLD-RTO: 0 /100 WBC
PLATELET # BLD AUTO: 500 K/UL (ref 150–450)
PMV BLD AUTO: 10.1 FL (ref 9.2–12.9)
PROT SERPL-MCNC: 6.7 G/DL (ref 6–8.4)
RBC # BLD AUTO: 4.69 M/UL (ref 4–5.4)
WBC # BLD AUTO: 16.79 K/UL (ref 3.9–12.7)

## 2024-01-25 PROCEDURE — 36415 COLL VENOUS BLD VENIPUNCTURE: CPT | Performed by: PSYCHIATRY & NEUROLOGY

## 2024-01-25 PROCEDURE — 85025 COMPLETE CBC W/AUTO DIFF WBC: CPT | Performed by: PSYCHIATRY & NEUROLOGY

## 2024-01-25 PROCEDURE — 80076 HEPATIC FUNCTION PANEL: CPT | Performed by: PSYCHIATRY & NEUROLOGY

## 2024-01-29 ENCOUNTER — PATIENT MESSAGE (OUTPATIENT)
Dept: NEUROLOGY | Facility: CLINIC | Age: 39
End: 2024-01-29
Payer: COMMERCIAL

## 2024-02-14 ENCOUNTER — PATIENT MESSAGE (OUTPATIENT)
Dept: NEUROLOGY | Facility: CLINIC | Age: 39
End: 2024-02-14
Payer: COMMERCIAL

## 2024-02-22 ENCOUNTER — LAB VISIT (OUTPATIENT)
Dept: LAB | Facility: HOSPITAL | Age: 39
End: 2024-02-22
Attending: PSYCHIATRY & NEUROLOGY
Payer: COMMERCIAL

## 2024-02-22 DIAGNOSIS — D84.9 IMMUNOSUPPRESSION: ICD-10-CM

## 2024-02-22 DIAGNOSIS — G35 MS (MULTIPLE SCLEROSIS): ICD-10-CM

## 2024-02-22 LAB
ALBUMIN SERPL BCP-MCNC: 3.7 G/DL (ref 3.5–5.2)
ALP SERPL-CCNC: 70 U/L (ref 55–135)
ALT SERPL W/O P-5'-P-CCNC: 27 U/L (ref 10–44)
AST SERPL-CCNC: 14 U/L (ref 10–40)
BASOPHILS # BLD AUTO: 0.08 K/UL (ref 0–0.2)
BASOPHILS NFR BLD: 0.5 % (ref 0–1.9)
BILIRUB DIRECT SERPL-MCNC: 0.1 MG/DL (ref 0.1–0.3)
BILIRUB SERPL-MCNC: 0.4 MG/DL (ref 0.1–1)
DIFFERENTIAL METHOD BLD: ABNORMAL
EOSINOPHIL # BLD AUTO: 0 K/UL (ref 0–0.5)
EOSINOPHIL NFR BLD: 0.2 % (ref 0–8)
ERYTHROCYTE [DISTWIDTH] IN BLOOD BY AUTOMATED COUNT: 13.2 % (ref 11.5–14.5)
HCT VFR BLD AUTO: 42.8 % (ref 37–48.5)
HGB BLD-MCNC: 14.6 G/DL (ref 12–16)
IMM GRANULOCYTES # BLD AUTO: 0.09 K/UL (ref 0–0.04)
IMM GRANULOCYTES NFR BLD AUTO: 0.6 % (ref 0–0.5)
LYMPHOCYTES # BLD AUTO: 1.1 K/UL (ref 1–4.8)
LYMPHOCYTES NFR BLD: 7.5 % (ref 18–48)
MCH RBC QN AUTO: 28.7 PG (ref 27–31)
MCHC RBC AUTO-ENTMCNC: 34.1 G/DL (ref 32–36)
MCV RBC AUTO: 84 FL (ref 82–98)
MONOCYTES # BLD AUTO: 0.4 K/UL (ref 0.3–1)
MONOCYTES NFR BLD: 2.5 % (ref 4–15)
NEUTROPHILS # BLD AUTO: 13.5 K/UL (ref 1.8–7.7)
NEUTROPHILS NFR BLD: 88.7 % (ref 38–73)
NRBC BLD-RTO: 0 /100 WBC
PLATELET # BLD AUTO: 476 K/UL (ref 150–450)
PMV BLD AUTO: 9.6 FL (ref 9.2–12.9)
PROT SERPL-MCNC: 6.8 G/DL (ref 6–8.4)
RBC # BLD AUTO: 5.08 M/UL (ref 4–5.4)
WBC # BLD AUTO: 15.24 K/UL (ref 3.9–12.7)

## 2024-02-22 PROCEDURE — 85025 COMPLETE CBC W/AUTO DIFF WBC: CPT | Performed by: PSYCHIATRY & NEUROLOGY

## 2024-02-22 PROCEDURE — 80076 HEPATIC FUNCTION PANEL: CPT | Performed by: PSYCHIATRY & NEUROLOGY

## 2024-02-22 PROCEDURE — 36415 COLL VENOUS BLD VENIPUNCTURE: CPT | Performed by: PSYCHIATRY & NEUROLOGY

## 2024-03-03 PROBLEM — R10.31 RIGHT LOWER QUADRANT ABDOMINAL PAIN: Status: ACTIVE | Noted: 2024-03-03

## 2024-03-03 PROBLEM — K57.80 DIVERTICULITIS OF INTESTINE WITH PERFORATION: Status: ACTIVE | Noted: 2024-03-03

## 2024-03-04 PROBLEM — E03.9 HYPOTHYROIDISM: Chronic | Status: ACTIVE | Noted: 2019-07-24

## 2024-03-04 PROBLEM — I10 HTN (HYPERTENSION): Chronic | Status: ACTIVE | Noted: 2019-07-24

## 2024-03-05 PROBLEM — E87.6 HYPOKALEMIA: Status: ACTIVE | Noted: 2024-03-05

## 2024-03-13 ENCOUNTER — PATIENT MESSAGE (OUTPATIENT)
Dept: NEUROLOGY | Facility: CLINIC | Age: 39
End: 2024-03-13
Payer: COMMERCIAL

## 2024-03-14 PROBLEM — E63.9 INADEQUATE DIETARY ENERGY INTAKE: Status: ACTIVE | Noted: 2024-03-14

## 2024-04-02 NOTE — PROGRESS NOTES
Subjective:          Patient ID: Sandra France is a 38 y.o. female who presents today for a routine virtual visit for MS.  She was last seen in October 2023. The history has been provided by the patient.     The patient location is:  her home   The chief complaint leading to consultation is: MS     Visit type: audiovisual    Face to Face time with patient: 15 minutes   25 minutes  minutes of total time spent on the encounter, which includes face to face time and non-face to face time preparing to see the patient (eg, review of tests), Obtaining and/or reviewing separately obtained history, Documenting clinical information in the electronic or other health record, Independently interpreting results (not separately reported) and communicating results to the patient/family/caregiver, or Care coordination (not separately reported).       Each patient to whom he or she provides medical services by telemedicine is:  (1) informed of the relationship between the physician and patient and the respective role of any other health care provider with respect to management of the patient; and (2) notified that he or she may decline to receive medical services by telemedicine and may withdraw from such care at any time.      MS HPI:  DMT: Aubagio--since end of December   Side effects from DMT? No  Taking vitamin D3 as recommended? Yes   She was hospitalized in March for nearly 2 weeks for diverticulitis with microperforation. She just finished antibiotics and started having pain again within a few days of stopping them. She sees her GI doctor next week. She thinks she will likely need surgery.   She had an upper respiratory infection in the beginning of the year and was treated with antibiotics.   She denies any change from an MS standpoint.   She is working full-time. She has not been exercising because of recent health issues.     Medications:  Current Outpatient Medications   Medication Sig    acetaminophen (TYLENOL) 325 MG  tablet Take 2 tablets (650 mg total) by mouth every 6 (six) hours as needed for Pain.    ASHLYNA 0.15 mg-30 mcg (84)/10 mcg (7) 3MPk Take 1 tablet by mouth every evening.    buPROPion (WELLBUTRIN XL) 300 MG 24 hr tablet Take 1 tablet (300 mg total) by mouth once daily. (Patient taking differently: Take 300 mg by mouth every morning.)    ergocalciferol, vitamin D2, (VITAMIN D ORAL) Take 10,000 Units by mouth once daily. 5 days a week    EScitalopram oxalate (LEXAPRO) 20 MG tablet Take 20 mg by mouth every morning.    fexofenadine (ALLEGRA) 60 MG tablet Take 60 mg by mouth every morning.    gabapentin (NEURONTIN) 100 MG capsule Take 1 capsule (100 mg total) by mouth nightly as needed.    levonorgestrel (MIRENA IU) 52 mg by Intrauterine route Daily.    levothyroxine (SYNTHROID) 75 MCG tablet Take 75 mcg by mouth before breakfast.     liothyronine sodium (LIOTHYRONINE ORAL) Take 10 mcg by mouth every morning.    lisinopril 10 MG tablet Take 10 mg by mouth every morning.    ondansetron (ZOFRAN-ODT) 8 MG TbDL Take 1 tablet (8 mg total) by mouth 2 (two) times daily.    teriflunomide (AUBAGIO) 14 mg Tab Take 14 mg by mouth once daily.    tirzepatide 2.5 mg/0.5 mL PnIj Inject 2.5 mg into the skin every 7 days.    topiramate (TOPAMAX) 100 MG tablet Take 1 tablet (100 mg total) by mouth 2 (two) times daily. (Patient not taking: Reported on 3/10/2024)       SOCIAL HISTORY  Social History     Tobacco Use    Smoking status: Some Days     Current packs/day: 0.25     Average packs/day: 0.3 packs/day for 12.4 years (3.1 ttl pk-yrs)     Types: Cigarettes     Start date: 8/17/2020     Passive exposure: Current    Smokeless tobacco: Never    Tobacco comments:     Began smoking end of 2022.currently smoking 2-3 cigarettes/day.   Substance Use Topics    Alcohol use: No     Comment: rarely    Drug use: Never       Living arrangements - the patient lives with her family     ROS:      4/5/24   REVIEW OF SYMPTOMS   Do you feel abnormally  tired on most days? Yes--still fatigued, but manageable    Do you feel you generally sleep well? No--wakes up sometimes at night; not terrible; tries to catch up on the weekends    Do you have difficulty controlling your bladder?  No recent UTIs or issues with bladder control    Do you have difficulty controlling your bowels?  No--becoming more regular again    Do you have frequent muscle cramps, tightness or spasms in your limbs?  No--not regularly    Do you have new visual symptoms?  No--wears glasses when working or reading    Do you have worsening difficulty with your memory or thinking? No concerns    Do you have worsening symptoms of anxiety or depression?  No   For patients who walk, Do you have more difficulty walking?  No    Have you fallen since your last visit?  No   For patients who use wheelchairs: Do you have any skin wounds or breakdown? Not Applicable   Do you have difficulty using your hands?  No--She feels like her  strength is not as good    Do you have shooting or burning pain? No--diverticulitis and endometriosis pain    Do you have difficulty with sexual function?  No   If you are sexually active, are you using birth control? Y/N  N/A Yes   Do you often choke when swallowing liquids or solid food?  No   Do you experience worsening symptoms when overheated? Yes--has a cooling vest and tries to avoids the heat    Do you need any new equipment such as a wheelchair, walker or shower chair? No   Do you receive co-pay financial assistance for your principal MS medicine? Yes--gets generic teriflunomide from [employer]    Would you be interested in participating in an MS research trial in the future? Yes   For patients on Gilenya, Tecfidera, Aubagio, Rituxan, Ocrevus, Tysabri, Lemtrada or Methotrexate, are you aware that you should NOT receive live virus vaccines?  Yes   Do you feel you have adequate family/friend support?  Yes   Do you have health insurance?   Yes   Are you currently employed? Yes    Do you receive SSDI/SSI?  Not Applicable   Do you use marijuana or cannabis products? No   Have you been diagnosed with a urinary tract infection since your last visit here? No   Have you been diagnosed with a respiratory tract infection since your last visit here? Yes   Have you been to the emergency room since your last visit here? Yes    Have you been hospitalized since your last visit here?  Yes             Objective:        1. 25 foot timed walk:      11/15/2022    12:01 AM 10/16/2023    12:01 AM   Timed 25 Foot Walk:   Did patient wear an AFO? No No   Was assistive device used? No No   Time for 25 Foot Walk (seconds) 4.2 4.5   Time for 25 Foot Walk (seconds)  4.4       Neurologic Exam    Deferred   Imaging:     Results for orders placed during the hospital encounter of 12/15/23    MRI Brain Demyelinating Without Contrast    Impression  Findings are consistent with the clinically suspected diagnosis of multiple sclerosis with a mild-moderate plaque burden.  There is no significant interval change compared with the prior study.      Electronically signed by: Matheus Gonsalez MD  Date:    12/15/2023  Time:    09:37        Labs:     Lab Results   Component Value Date    YOPLGBKK11PV 80 05/22/2023    QYMOYVYR10XR 69 05/20/2022    DMRFOXIA70UM 71 04/20/2021     Lab Results   Component Value Date    JCVINDEX 0.12 05/14/2020    JCVANTIBODY Negative 05/14/2020     Lab Results   Component Value Date    PU3JEYHA 81.0 05/14/2020    ABSOLUTECD3 5009.0 (H) 05/14/2020    NG2RRPAG 21.9 05/14/2020    ABSOLUTECD8 1351.0 (H) 05/14/2020    ML8JULTW 58.7 (H) 05/14/2020    ABSOLUTECD4 3629.0 (H) 05/14/2020    LABCD48 2.69 05/14/2020     Lab Results   Component Value Date    WBC 8.30 03/16/2024    RBC 4.50 03/16/2024    HGB 12.7 03/16/2024    HCT 38.2 03/16/2024    MCV 85 03/16/2024    MCH 28.2 03/16/2024    MCHC 33.2 03/16/2024    RDW 13.6 03/16/2024     (H) 03/16/2024    MPV 7.5 03/16/2024    GRAN 5.4 03/16/2024    GRAN  64.8 03/16/2024    LYMPH 1.6 03/16/2024    LYMPH 18.8 03/16/2024    MONO 0.9 03/16/2024    MONO 10.9 03/16/2024    EOS 0.4 03/16/2024    BASO 0.10 03/16/2024    EOSINOPHIL 4.3 03/16/2024    BASOPHIL 1.2 03/16/2024     Sodium   Date Value Ref Range Status   03/16/2024 138 136 - 145 mmol/L Final     Potassium   Date Value Ref Range Status   03/16/2024 4.0 3.5 - 5.1 mmol/L Final     Chloride   Date Value Ref Range Status   03/16/2024 105 95 - 110 mmol/L Final     CO2   Date Value Ref Range Status   03/16/2024 25 23 - 29 mmol/L Final     Glucose   Date Value Ref Range Status   03/16/2024 116 (H) 74 - 106 mg/dL Final     BUN   Date Value Ref Range Status   03/16/2024 6 (L) 7 - 17 mg/dL Final     Creatinine   Date Value Ref Range Status   03/16/2024 0.57 (L) 0.70 - 1.20 mg/dL Final     Calcium   Date Value Ref Range Status   03/16/2024 9.3 8.4 - 10.2 mg/dL Final     Total Protein   Date Value Ref Range Status   03/16/2024 5.9 (L) 6.3 - 8.2 g/dL Final     Albumin   Date Value Ref Range Status   03/16/2024 3.7 3.5 - 5.2 g/dL Final     Total Bilirubin   Date Value Ref Range Status   03/16/2024 0.4 0.2 - 1.3 mg/dL Final     Alkaline Phosphatase   Date Value Ref Range Status   03/16/2024 74 38 - 145 U/L Final     AST   Date Value Ref Range Status   03/16/2024 44 (H) 14 - 36 U/L Final     ALT   Date Value Ref Range Status   03/16/2024 60 (H) 10 - 44 U/L Final     Anion Gap   Date Value Ref Range Status   03/16/2024 8 8 - 16 mmol/L Final     eGFR if    Date Value Ref Range Status   05/14/2020 >60 >60 mL/min/1.73 m^2 Final     eGFR if non    Date Value Ref Range Status   05/14/2020 >60 >60 mL/min/1.73 m^2 Final     Comment:     Calculation used to obtain the estimated glomerular filtration  rate (eGFR) is the CKD-EPI equation.        Lab Results   Component Value Date    HEPBSAG Non-reactive 05/22/2023    HEPBSAB <3.00 11/15/2022    HEPBSAB Non-reactive 11/15/2022    HEPBCAB Non-reactive 05/22/2023            MS Impression and Plan:     NEURO MULTIPLE SCLEROSIS IMPRESSION:   Number of relapses in the past year?:  0  Clinical Progression:  Clinically Stable  MS Classification:  Relapsing-Remitting MS  DMT:  No change in management  DMT comment:  Continue teriflunomide and Vitamin D. She will send us recent labs drawn with another provider and resume monthly labs as ordered by us in late April.   Symptom Management:  No change in symptom management  Supplements:  Vit D (She will check to make sure she has Vit D3.)     MRI brain at 6 month interval after starting teriflunomide  She will follow up with Dr. Campos in 6 months.   The visit today is associated with current or anticipated ongoing medical care related to this patient's single serious condition/complex condition of multiple sclerosis.        NICHELLE Navarro, CNS    Problem List Items Addressed This Visit          Neurologic Problems    Multiple sclerosis - Primary    Relevant Orders    MRI Brain Demyelinating Without Contrast    CBC auto differential    Hepatic function panel     Other Visit Diagnoses       Counseling regarding goals of care        Prophylactic immunotherapy        High risk medication use

## 2024-04-05 ENCOUNTER — OFFICE VISIT (OUTPATIENT)
Dept: NEUROLOGY | Facility: CLINIC | Age: 39
End: 2024-04-05
Payer: COMMERCIAL

## 2024-04-05 ENCOUNTER — PATIENT MESSAGE (OUTPATIENT)
Dept: NEUROLOGY | Facility: CLINIC | Age: 39
End: 2024-04-05

## 2024-04-05 DIAGNOSIS — G35 MULTIPLE SCLEROSIS: Primary | ICD-10-CM

## 2024-04-05 DIAGNOSIS — Z71.89 COUNSELING REGARDING GOALS OF CARE: ICD-10-CM

## 2024-04-05 DIAGNOSIS — Z29.89 PROPHYLACTIC IMMUNOTHERAPY: ICD-10-CM

## 2024-04-05 DIAGNOSIS — Z79.899 HIGH RISK MEDICATION USE: ICD-10-CM

## 2024-04-05 PROCEDURE — 1111F DSCHRG MED/CURRENT MED MERGE: CPT | Mod: CPTII,95,, | Performed by: CLINICAL NURSE SPECIALIST

## 2024-04-05 PROCEDURE — 99213 OFFICE O/P EST LOW 20 MIN: CPT | Mod: 95,,, | Performed by: CLINICAL NURSE SPECIALIST

## 2024-04-05 PROCEDURE — 4010F ACE/ARB THERAPY RXD/TAKEN: CPT | Mod: CPTII,95,, | Performed by: CLINICAL NURSE SPECIALIST

## 2024-04-05 PROCEDURE — 1159F MED LIST DOCD IN RCRD: CPT | Mod: CPTII,95,, | Performed by: CLINICAL NURSE SPECIALIST

## 2024-04-05 PROCEDURE — G2211 COMPLEX E/M VISIT ADD ON: HCPCS | Mod: 95,,, | Performed by: CLINICAL NURSE SPECIALIST

## 2024-04-06 ENCOUNTER — PATIENT MESSAGE (OUTPATIENT)
Dept: NEUROLOGY | Facility: CLINIC | Age: 39
End: 2024-04-06
Payer: COMMERCIAL

## 2024-04-06 DIAGNOSIS — G35 MS (MULTIPLE SCLEROSIS): ICD-10-CM

## 2024-04-08 RX ORDER — TERIFLUNOMIDE 14 MG/1
14 TABLET, FILM COATED ORAL DAILY
Qty: 90 TABLET | Refills: 0
Start: 2024-04-08 | End: 2024-04-16 | Stop reason: SDUPTHER

## 2024-04-12 ENCOUNTER — PATIENT MESSAGE (OUTPATIENT)
Dept: NEUROLOGY | Facility: CLINIC | Age: 39
End: 2024-04-12
Payer: COMMERCIAL

## 2024-04-25 ENCOUNTER — LAB VISIT (OUTPATIENT)
Dept: LAB | Facility: HOSPITAL | Age: 39
End: 2024-04-25
Attending: PSYCHIATRY & NEUROLOGY
Payer: COMMERCIAL

## 2024-04-25 DIAGNOSIS — D84.9 IMMUNOSUPPRESSION: ICD-10-CM

## 2024-04-25 DIAGNOSIS — G35 MS (MULTIPLE SCLEROSIS): ICD-10-CM

## 2024-04-25 LAB
ALBUMIN SERPL BCP-MCNC: 3.5 G/DL (ref 3.5–5.2)
ALP SERPL-CCNC: 78 U/L (ref 55–135)
ALT SERPL W/O P-5'-P-CCNC: 24 U/L (ref 10–44)
AST SERPL-CCNC: 16 U/L (ref 10–40)
BASOPHILS # BLD AUTO: 0.08 K/UL (ref 0–0.2)
BASOPHILS NFR BLD: 0.7 % (ref 0–1.9)
BILIRUB DIRECT SERPL-MCNC: 0.2 MG/DL (ref 0.1–0.3)
BILIRUB SERPL-MCNC: 0.5 MG/DL (ref 0.1–1)
DIFFERENTIAL METHOD BLD: ABNORMAL
EOSINOPHIL # BLD AUTO: 0.3 K/UL (ref 0–0.5)
EOSINOPHIL NFR BLD: 2.5 % (ref 0–8)
ERYTHROCYTE [DISTWIDTH] IN BLOOD BY AUTOMATED COUNT: 14.2 % (ref 11.5–14.5)
HCT VFR BLD AUTO: 42.3 % (ref 37–48.5)
HGB BLD-MCNC: 13.9 G/DL (ref 12–16)
IMM GRANULOCYTES # BLD AUTO: 0.06 K/UL (ref 0–0.04)
IMM GRANULOCYTES NFR BLD AUTO: 0.5 % (ref 0–0.5)
LYMPHOCYTES # BLD AUTO: 2.4 K/UL (ref 1–4.8)
LYMPHOCYTES NFR BLD: 20.6 % (ref 18–48)
MCH RBC QN AUTO: 28.7 PG (ref 27–31)
MCHC RBC AUTO-ENTMCNC: 32.9 G/DL (ref 32–36)
MCV RBC AUTO: 87 FL (ref 82–98)
MONOCYTES # BLD AUTO: 0.8 K/UL (ref 0.3–1)
MONOCYTES NFR BLD: 7.1 % (ref 4–15)
NEUTROPHILS # BLD AUTO: 7.9 K/UL (ref 1.8–7.7)
NEUTROPHILS NFR BLD: 68.6 % (ref 38–73)
NRBC BLD-RTO: 0 /100 WBC
PLATELET # BLD AUTO: 516 K/UL (ref 150–450)
PMV BLD AUTO: 9.6 FL (ref 9.2–12.9)
PROT SERPL-MCNC: 6.3 G/DL (ref 6–8.4)
RBC # BLD AUTO: 4.85 M/UL (ref 4–5.4)
WBC # BLD AUTO: 11.55 K/UL (ref 3.9–12.7)

## 2024-04-25 PROCEDURE — 85025 COMPLETE CBC W/AUTO DIFF WBC: CPT | Performed by: PSYCHIATRY & NEUROLOGY

## 2024-04-25 PROCEDURE — 36415 COLL VENOUS BLD VENIPUNCTURE: CPT | Performed by: PSYCHIATRY & NEUROLOGY

## 2024-04-25 PROCEDURE — 80076 HEPATIC FUNCTION PANEL: CPT | Performed by: PSYCHIATRY & NEUROLOGY

## 2024-05-02 ENCOUNTER — PATIENT MESSAGE (OUTPATIENT)
Dept: NEUROLOGY | Facility: CLINIC | Age: 39
End: 2024-05-02
Payer: COMMERCIAL

## 2024-05-02 ENCOUNTER — PATIENT MESSAGE (OUTPATIENT)
Dept: PSYCHIATRY | Facility: CLINIC | Age: 39
End: 2024-05-02
Payer: COMMERCIAL

## 2024-05-08 ENCOUNTER — PATIENT MESSAGE (OUTPATIENT)
Dept: NEUROLOGY | Facility: CLINIC | Age: 39
End: 2024-05-08
Payer: COMMERCIAL

## 2024-07-01 ENCOUNTER — PATIENT MESSAGE (OUTPATIENT)
Dept: NEUROLOGY | Facility: CLINIC | Age: 39
End: 2024-07-01
Payer: COMMERCIAL

## 2024-07-01 DIAGNOSIS — D84.9 IMMUNOSUPPRESSION: ICD-10-CM

## 2024-07-01 DIAGNOSIS — G35 MS (MULTIPLE SCLEROSIS): Primary | ICD-10-CM

## 2024-07-03 ENCOUNTER — LAB VISIT (OUTPATIENT)
Dept: LAB | Facility: HOSPITAL | Age: 39
End: 2024-07-03
Attending: CLINICAL NURSE SPECIALIST
Payer: COMMERCIAL

## 2024-07-03 DIAGNOSIS — D84.9 IMMUNOSUPPRESSION: ICD-10-CM

## 2024-07-03 DIAGNOSIS — G35 MS (MULTIPLE SCLEROSIS): ICD-10-CM

## 2024-07-03 LAB
ALBUMIN SERPL BCP-MCNC: 3.8 G/DL (ref 3.5–5.2)
ALP SERPL-CCNC: 96 U/L (ref 55–135)
ALT SERPL W/O P-5'-P-CCNC: 46 U/L (ref 10–44)
ANION GAP SERPL CALC-SCNC: 12 MMOL/L (ref 8–16)
AST SERPL-CCNC: 30 U/L (ref 10–40)
BASOPHILS # BLD AUTO: 0.11 K/UL (ref 0–0.2)
BASOPHILS NFR BLD: 0.7 % (ref 0–1.9)
BILIRUB SERPL-MCNC: 0.4 MG/DL (ref 0.1–1)
BUN SERPL-MCNC: 14 MG/DL (ref 6–20)
CALCIUM SERPL-MCNC: 9.6 MG/DL (ref 8.7–10.5)
CHLORIDE SERPL-SCNC: 102 MMOL/L (ref 95–110)
CO2 SERPL-SCNC: 24 MMOL/L (ref 23–29)
CREAT SERPL-MCNC: 0.7 MG/DL (ref 0.5–1.4)
DIFFERENTIAL METHOD BLD: ABNORMAL
EOSINOPHIL # BLD AUTO: 0.4 K/UL (ref 0–0.5)
EOSINOPHIL NFR BLD: 2.5 % (ref 0–8)
ERYTHROCYTE [DISTWIDTH] IN BLOOD BY AUTOMATED COUNT: 13.5 % (ref 11.5–14.5)
EST. GFR  (NO RACE VARIABLE): >60 ML/MIN/1.73 M^2
GLUCOSE SERPL-MCNC: 94 MG/DL (ref 70–110)
HCT VFR BLD AUTO: 41.9 % (ref 37–48.5)
HGB BLD-MCNC: 13.7 G/DL (ref 12–16)
IMM GRANULOCYTES # BLD AUTO: 0.21 K/UL (ref 0–0.04)
IMM GRANULOCYTES NFR BLD AUTO: 1.4 % (ref 0–0.5)
LYMPHOCYTES # BLD AUTO: 4.2 K/UL (ref 1–4.8)
LYMPHOCYTES NFR BLD: 27.6 % (ref 18–48)
MCH RBC QN AUTO: 29 PG (ref 27–31)
MCHC RBC AUTO-ENTMCNC: 32.7 G/DL (ref 32–36)
MCV RBC AUTO: 89 FL (ref 82–98)
MONOCYTES # BLD AUTO: 1.3 K/UL (ref 0.3–1)
MONOCYTES NFR BLD: 8.7 % (ref 4–15)
NEUTROPHILS # BLD AUTO: 9 K/UL (ref 1.8–7.7)
NEUTROPHILS NFR BLD: 59.1 % (ref 38–73)
NRBC BLD-RTO: 0 /100 WBC
PLATELET # BLD AUTO: 516 K/UL (ref 150–450)
PMV BLD AUTO: 9 FL (ref 9.2–12.9)
POTASSIUM SERPL-SCNC: 4.5 MMOL/L (ref 3.5–5.1)
PROT SERPL-MCNC: 6.6 G/DL (ref 6–8.4)
RBC # BLD AUTO: 4.73 M/UL (ref 4–5.4)
SODIUM SERPL-SCNC: 138 MMOL/L (ref 136–145)
WBC # BLD AUTO: 15.26 K/UL (ref 3.9–12.7)

## 2024-07-03 PROCEDURE — 36415 COLL VENOUS BLD VENIPUNCTURE: CPT | Performed by: CLINICAL NURSE SPECIALIST

## 2024-07-03 PROCEDURE — 85025 COMPLETE CBC W/AUTO DIFF WBC: CPT | Performed by: CLINICAL NURSE SPECIALIST

## 2024-07-03 PROCEDURE — 80053 COMPREHEN METABOLIC PANEL: CPT | Performed by: CLINICAL NURSE SPECIALIST

## 2024-07-08 DIAGNOSIS — G35 MULTIPLE SCLEROSIS: Primary | ICD-10-CM

## 2024-07-11 ENCOUNTER — LAB VISIT (OUTPATIENT)
Dept: LAB | Facility: HOSPITAL | Age: 39
End: 2024-07-11
Attending: CLINICAL NURSE SPECIALIST
Payer: COMMERCIAL

## 2024-07-11 DIAGNOSIS — G35 MULTIPLE SCLEROSIS: ICD-10-CM

## 2024-07-11 LAB
BASOPHILS # BLD AUTO: 0.08 K/UL (ref 0–0.2)
BASOPHILS NFR BLD: 0.7 % (ref 0–1.9)
DIFFERENTIAL METHOD BLD: ABNORMAL
EOSINOPHIL # BLD AUTO: 0.4 K/UL (ref 0–0.5)
EOSINOPHIL NFR BLD: 3.1 % (ref 0–8)
ERYTHROCYTE [DISTWIDTH] IN BLOOD BY AUTOMATED COUNT: 13.4 % (ref 11.5–14.5)
HCT VFR BLD AUTO: 42.2 % (ref 37–48.5)
HGB BLD-MCNC: 13.8 G/DL (ref 12–16)
IMM GRANULOCYTES # BLD AUTO: 0.08 K/UL (ref 0–0.04)
IMM GRANULOCYTES NFR BLD AUTO: 0.7 % (ref 0–0.5)
LYMPHOCYTES # BLD AUTO: 2.6 K/UL (ref 1–4.8)
LYMPHOCYTES NFR BLD: 21.9 % (ref 18–48)
MCH RBC QN AUTO: 29.1 PG (ref 27–31)
MCHC RBC AUTO-ENTMCNC: 32.7 G/DL (ref 32–36)
MCV RBC AUTO: 89 FL (ref 82–98)
MONOCYTES # BLD AUTO: 0.7 K/UL (ref 0.3–1)
MONOCYTES NFR BLD: 6.3 % (ref 4–15)
NEUTROPHILS # BLD AUTO: 7.9 K/UL (ref 1.8–7.7)
NEUTROPHILS NFR BLD: 67.3 % (ref 38–73)
NRBC BLD-RTO: 0 /100 WBC
PLATELET # BLD AUTO: 474 K/UL (ref 150–450)
PMV BLD AUTO: 9.1 FL (ref 9.2–12.9)
RBC # BLD AUTO: 4.74 M/UL (ref 4–5.4)
WBC # BLD AUTO: 11.65 K/UL (ref 3.9–12.7)

## 2024-07-11 PROCEDURE — 36415 COLL VENOUS BLD VENIPUNCTURE: CPT | Performed by: CLINICAL NURSE SPECIALIST

## 2024-07-11 PROCEDURE — 85025 COMPLETE CBC W/AUTO DIFF WBC: CPT | Performed by: CLINICAL NURSE SPECIALIST

## 2024-07-22 ENCOUNTER — TELEPHONE (OUTPATIENT)
Dept: NEUROLOGY | Facility: CLINIC | Age: 39
End: 2024-07-22
Payer: COMMERCIAL

## 2024-07-23 ENCOUNTER — TELEPHONE (OUTPATIENT)
Dept: NEUROLOGY | Facility: CLINIC | Age: 39
End: 2024-07-23
Payer: COMMERCIAL

## 2024-07-25 ENCOUNTER — PATIENT MESSAGE (OUTPATIENT)
Dept: PSYCHIATRY | Facility: CLINIC | Age: 39
End: 2024-07-25
Payer: COMMERCIAL

## 2024-07-30 ENCOUNTER — TELEPHONE (OUTPATIENT)
Dept: NEUROLOGY | Facility: CLINIC | Age: 39
End: 2024-07-30
Payer: COMMERCIAL

## 2024-07-31 ENCOUNTER — TELEPHONE (OUTPATIENT)
Dept: NEUROLOGY | Facility: CLINIC | Age: 39
End: 2024-07-31
Payer: COMMERCIAL

## 2024-08-02 ENCOUNTER — TELEPHONE (OUTPATIENT)
Dept: NEUROLOGY | Facility: CLINIC | Age: 39
End: 2024-08-02
Payer: COMMERCIAL

## 2024-08-05 ENCOUNTER — PATIENT MESSAGE (OUTPATIENT)
Dept: PSYCHIATRY | Facility: CLINIC | Age: 39
End: 2024-08-05
Payer: COMMERCIAL

## 2024-08-06 ENCOUNTER — TELEPHONE (OUTPATIENT)
Dept: NEUROLOGY | Facility: CLINIC | Age: 39
End: 2024-08-06
Payer: COMMERCIAL

## 2024-08-09 ENCOUNTER — PATIENT MESSAGE (OUTPATIENT)
Dept: NEUROLOGY | Facility: CLINIC | Age: 39
End: 2024-08-09
Payer: COMMERCIAL

## 2024-10-14 ENCOUNTER — PATIENT MESSAGE (OUTPATIENT)
Dept: NEUROLOGY | Facility: CLINIC | Age: 39
End: 2024-10-14
Payer: COMMERCIAL

## 2024-10-14 DIAGNOSIS — G35 MS (MULTIPLE SCLEROSIS): ICD-10-CM

## 2024-10-16 RX ORDER — TERIFLUNOMIDE 14 MG/1
14 TABLET, FILM COATED ORAL DAILY
Qty: 90 TABLET | Refills: 0 | Status: SHIPPED | OUTPATIENT
Start: 2024-10-16 | End: 2025-10-16

## 2024-10-25 ENCOUNTER — HOSPITAL ENCOUNTER (OUTPATIENT)
Dept: RADIOLOGY | Facility: HOSPITAL | Age: 39
Discharge: HOME | End: 2024-10-25
Attending: CLINICAL NURSE SPECIALIST
Payer: COMMERCIAL

## 2024-10-25 DIAGNOSIS — G35 MULTIPLE SCLEROSIS: ICD-10-CM

## 2024-10-25 PROCEDURE — 70551 MRI BRAIN STEM W/O DYE: CPT | Mod: 26,,, | Performed by: RADIOLOGY

## 2024-10-25 PROCEDURE — 70551 MRI BRAIN STEM W/O DYE: CPT | Mod: TC

## 2024-11-06 ENCOUNTER — OFFICE VISIT (OUTPATIENT)
Dept: NEUROLOGY | Facility: CLINIC | Age: 39
End: 2024-11-06
Payer: COMMERCIAL

## 2024-11-06 VITALS
HEART RATE: 96 BPM | HEIGHT: 59 IN | SYSTOLIC BLOOD PRESSURE: 148 MMHG | BODY MASS INDEX: 56.94 KG/M2 | DIASTOLIC BLOOD PRESSURE: 98 MMHG | WEIGHT: 282.44 LBS

## 2024-11-06 DIAGNOSIS — R90.89 ABNORMAL FINDING ON MRI OF BRAIN: ICD-10-CM

## 2024-11-06 DIAGNOSIS — Z29.89 PROPHYLACTIC IMMUNOTHERAPY: ICD-10-CM

## 2024-11-06 DIAGNOSIS — E55.9 VITAMIN D DEFICIENCY: ICD-10-CM

## 2024-11-06 DIAGNOSIS — Z71.89 COUNSELING REGARDING GOALS OF CARE: ICD-10-CM

## 2024-11-06 DIAGNOSIS — G35 MS (MULTIPLE SCLEROSIS): Primary | ICD-10-CM

## 2024-11-06 PROCEDURE — 1159F MED LIST DOCD IN RCRD: CPT | Mod: CPTII,S$GLB,, | Performed by: PSYCHIATRY & NEUROLOGY

## 2024-11-06 PROCEDURE — 4010F ACE/ARB THERAPY RXD/TAKEN: CPT | Mod: CPTII,S$GLB,, | Performed by: PSYCHIATRY & NEUROLOGY

## 2024-11-06 PROCEDURE — 99999 PR PBB SHADOW E&M-EST. PATIENT-LVL III: CPT | Mod: PBBFAC,,, | Performed by: PSYCHIATRY & NEUROLOGY

## 2024-11-06 PROCEDURE — 3008F BODY MASS INDEX DOCD: CPT | Mod: CPTII,S$GLB,, | Performed by: PSYCHIATRY & NEUROLOGY

## 2024-11-06 PROCEDURE — 3080F DIAST BP >= 90 MM HG: CPT | Mod: CPTII,S$GLB,, | Performed by: PSYCHIATRY & NEUROLOGY

## 2024-11-06 PROCEDURE — 99214 OFFICE O/P EST MOD 30 MIN: CPT | Mod: S$GLB,,, | Performed by: PSYCHIATRY & NEUROLOGY

## 2024-11-06 PROCEDURE — 1160F RVW MEDS BY RX/DR IN RCRD: CPT | Mod: CPTII,S$GLB,, | Performed by: PSYCHIATRY & NEUROLOGY

## 2024-11-06 PROCEDURE — 3077F SYST BP >= 140 MM HG: CPT | Mod: CPTII,S$GLB,, | Performed by: PSYCHIATRY & NEUROLOGY

## 2024-11-06 PROCEDURE — G2211 COMPLEX E/M VISIT ADD ON: HCPCS | Mod: S$GLB,,, | Performed by: PSYCHIATRY & NEUROLOGY

## 2024-11-06 NOTE — PROGRESS NOTES
Subjective:          Patient ID: Sandra France is a 39 y.o. female who presents today for a routine clinic visit for MS.      MS HPI:  DMT: teriflunomide  Side effects from DMT? No  Taking vitamin D3 as recommended? Yes - 10,000 IU / M-F  Had surgery for diverticulitis in June along with hysterectomy.  No other infections - fewer URIs since stopping Ocrevus  She continues to have severe fatigue;  she asks about medication;   needs CPAP but does not wear   Taking gabapentin prn hs for leg pain;   Has some bladder issues that have improved in past with pelvic floor therapy; states she's OK right now    Medications:  Current Outpatient Medications   Medication Sig    buPROPion (WELLBUTRIN XL) 300 MG 24 hr tablet Take 1 tablet (300 mg total) by mouth once daily.    calcium carbonate/vitamin D3 (VITAMIN D-3 ORAL) Take 5,000 Units by mouth Daily. 2 tabs    EScitalopram oxalate (LEXAPRO) 20 MG tablet Take 20 mg by mouth every morning.    levothyroxine (SYNTHROID) 75 MCG tablet Take 75 mcg by mouth before breakfast.     liothyronine sodium (LIOTHYRONINE ORAL) Take 10 mcg by mouth every morning.    lisinopril 10 MG tablet Take 10 mg by mouth every morning.    ondansetron (ZOFRAN-ODT) 8 MG TbDL Take 1 tablet (8 mg total) by mouth 2 (two) times daily.    teriflunomide (AUBAGIO) 14 mg Tab Take 14 mg by mouth once daily.    gabapentin (NEURONTIN) 100 MG capsule Take 1 capsule (100 mg total) by mouth nightly as needed.     No current facility-administered medications for this visit.       SOCIAL HISTORY  Social History     Tobacco Use    Smoking status: Some Days     Current packs/day: 0.25     Average packs/day: 0.3 packs/day for 13.0 years (3.3 ttl pk-yrs)     Types: Cigarettes     Start date: 8/17/2020     Passive exposure: Current    Smokeless tobacco: Never    Tobacco comments:     Began smoking end of 2022.currently smoking 2-3 cigarettes/day.   Substance Use Topics    Alcohol use: No     Comment: rarely    Drug use: Never        Living arrangements - the patient lives with their spouse.  Works as manager at a [employer]            11/5/2024     7:24 PM   REVIEW OF SYMPTOMS   Do you feel abnormally tired on most days? Yes    Do you feel you generally sleep well? No    Do you have difficulty controlling your bladder?  Yes    Do you have difficulty controlling your bowels?  No    Do you have frequent muscle cramps, tightness or spasms in your limbs?  No    Do you have new visual symptoms?  No    Do you have worsening difficulty with your memory or thinking? No    Do you have worsening symptoms of anxiety or depression?  No    For patients who walk, Do you have more difficulty walking?  No    Have you fallen since your last visit?  No    For patients who use wheelchairs: Do you have any skin wounds or breakdown? Not Applicable    Do you have difficulty using your hands?  No    Do you have shooting or burning pain? No    Do you have difficulty with sexual function?  No    If you are sexually active, are you using birth control? Y/N  N/A No    Do you often choke when swallowing liquids or solid food?  No    Do you experience worsening symptoms when overheated? Yes    Do you need any new equipment such as a wheelchair, walker or shower chair? No    Do you receive co-pay financial assistance for your principal MS medicine? No    Would you be interested in participating in an MS research trial in the future? Yes    For patients on Gilenya, Tecfidera, Aubagio, Rituxan, Ocrevus, Tysabri, Lemtrada or Methotrexate, are you aware that you should NOT receive live virus vaccines?  Yes    Do you feel you have adequate family/friend support?  Yes    Do you have health insurance?   Yes    Are you currently employed? Yes    Do you receive SSDI/SSI?  Not Applicable    Do you use marijuana or cannabis products? No    Have you been diagnosed with a urinary tract infection since your last visit here? No    Have you been diagnosed with a respiratory  tract infection since your last visit here? No    Have you been to the emergency room since your last visit here? Yes    Have you been hospitalized since your last visit here?  Yes        Patient-reported              Objective:        Neurological Exam  MENTAL STATUS: grossly intact  CRANIAL NERVE EXAM: There is no internuclear ophthalmoplegia. Extraocular   muscles are intact.  No facial   asymmetry.There is no dysarthria.   MOTOR EXAM: Normal bulk and tone throughout UE and LE bilaterally. Rapid sequential movements are normal. Strength is 5/5 in all groups   in the lower extremities and upper extremities.   REFLEXES: Symmetric and 1+ throughout in all 4 extremities.   SENSORY EXAM: Normal to LT t/o  COORDINATION: Normal finger-to-nose exam.   GAIT: Narrow based and stable    Imaging:     Results for orders placed during the hospital encounter of 10/25/24    MRI Brain Demyelinating Without Contrast    Impression  Brain appears unchanged from prior exam, again demonstrating findings compatible with the reported history of multiple sclerosis.  No new discrete lesions to indicate ongoing demyelination.      Electronically signed by: Michael Sesay MD  Date:    10/25/2024  Time:    09:53    Results for orders placed during the hospital encounter of 10/06/22    MRI Cervical Spine Demyelinating Without Contrast    Impression  Brain and cervical spinal cord appear stable from prior exams, again demonstrating findings compatible with the reported history of multiple sclerosis. No new discrete lesions to indicate ongoing demyelination.      Electronically signed by: Michael Sesay MD  Date:    10/06/2022  Time:    12:03    No results found for this or any previous visit.    Results for orders placed during the hospital encounter of 10/14/21    MRI Brain Demyelinating W W/O Contrast    Impression  Brain appears stable from prior exam, again demonstrating findings compatible with the reported history of multiple sclerosis.   No new or enhancing lesions to indicate ongoing or active demyelination.    Electronically signed by resident: Prema Russ  Date:    10/15/2021  Time:    08:06    Electronically signed by: Michael Sesay MD  Date:    10/15/2021  Time:    13:02    Results for orders placed during the hospital encounter of 09/30/19    MRI Cervical Spine Demyelinating W W/O Contrast    Impression  Subtle short-segment STIR signal hyperintensity cervical spinal cord dorsally at the C6 vertebral body level which may be artifactual without corresponding signal abnormality on additional sequences.  Sequela of prior demyelination to be considered in light of history.    No evidence for additional edema signal throughout the remaining cervicothoracic cord.  No abnormal intrathecal enhancement.    Mild degenerative changes as detailed above      Electronically signed by: Edward Elena DO  Date:    09/30/2019  Time:    11:13    Results for orders placed during the hospital encounter of 09/30/19    MRI Thoracic Spine Demyelinating W W/O Contrast    Impression  Subtle short-segment STIR signal hyperintensity cervical spinal cord dorsally at the C6 vertebral body level which may be artifactual without corresponding signal abnormality on additional sequences.  Sequela of prior demyelination to be considered in light of history.    No evidence for additional edema signal throughout the remaining cervicothoracic cord.  No abnormal intrathecal enhancement.    Mild degenerative changes as detailed above      Electronically signed by: Edward Elena DO  Date:    09/30/2019  Time:    11:13        Labs:     Lab Results   Component Value Date    QJHBMUAW74AA 80 05/22/2023    LXOTIAYA09PZ 69 05/20/2022    ANNPNXYY83WG 71 04/20/2021     Lab Results   Component Value Date    JCVINDEX 0.12 05/14/2020    JCVANTIBODY Negative 05/14/2020     Lab Results   Component Value Date    KY8TXMZR 81.0 05/14/2020    ABSOLUTECD3 5009.0 (H) 05/14/2020    FR8INWBS 21.9  05/14/2020    ABSOLUTECD8 1351.0 (H) 05/14/2020    HU0VVRZB 58.7 (H) 05/14/2020    ABSOLUTECD4 3629.0 (H) 05/14/2020    LABCD48 2.69 05/14/2020     Lab Results   Component Value Date    WBC 11.98 10/25/2024    RBC 4.94 10/25/2024    HGB 14.5 10/25/2024    HCT 43.5 10/25/2024    MCV 88 10/25/2024    MCH 29.4 10/25/2024    MCHC 33.3 10/25/2024    RDW 13.0 10/25/2024     10/25/2024    MPV 10.1 10/25/2024    GRAN 8.2 (H) 10/25/2024    GRAN 68.4 10/25/2024    LYMPH 2.4 10/25/2024    LYMPH 20.0 10/25/2024    MONO 0.8 10/25/2024    MONO 6.9 10/25/2024    EOS 0.4 10/25/2024    BASO 0.10 10/25/2024    EOSINOPHIL 3.3 10/25/2024    BASOPHIL 0.8 10/25/2024     Sodium   Date Value Ref Range Status   07/03/2024 138 136 - 145 mmol/L Final     Potassium   Date Value Ref Range Status   07/03/2024 4.5 3.5 - 5.1 mmol/L Final     Chloride   Date Value Ref Range Status   07/03/2024 102 95 - 110 mmol/L Final     CO2   Date Value Ref Range Status   07/03/2024 24 23 - 29 mmol/L Final     Glucose   Date Value Ref Range Status   07/03/2024 94 70 - 110 mg/dL Final     BUN   Date Value Ref Range Status   07/03/2024 14 6 - 20 mg/dL Final     Creatinine   Date Value Ref Range Status   07/03/2024 0.7 0.5 - 1.4 mg/dL Final     Calcium   Date Value Ref Range Status   07/03/2024 9.6 8.7 - 10.5 mg/dL Final     Total Protein   Date Value Ref Range Status   10/25/2024 6.3 6.0 - 8.4 g/dL Final     Albumin   Date Value Ref Range Status   10/25/2024 3.4 (L) 3.5 - 5.2 g/dL Final     Total Bilirubin   Date Value Ref Range Status   10/25/2024 0.6 0.1 - 1.0 mg/dL Final     Comment:     For infants and newborns, interpretation of results should be based  on gestational age, weight and in agreement with clinical  observations.    Premature Infant recommended reference ranges:  Up to 24 hours.............<8.0 mg/dL  Up to 48 hours............<12.0 mg/dL  3-5 days..................<15.0 mg/dL  6-29 days.................<15.0 mg/dL       Alkaline  Phosphatase   Date Value Ref Range Status   10/25/2024 122 40 - 150 U/L Final     AST   Date Value Ref Range Status   10/25/2024 24 10 - 40 U/L Final     ALT   Date Value Ref Range Status   10/25/2024 41 10 - 44 U/L Final     Anion Gap   Date Value Ref Range Status   07/03/2024 12 8 - 16 mmol/L Final     eGFR if    Date Value Ref Range Status   05/14/2020 >60 >60 mL/min/1.73 m^2 Final     eGFR if non    Date Value Ref Range Status   05/14/2020 >60 >60 mL/min/1.73 m^2 Final     Comment:     Calculation used to obtain the estimated glomerular filtration  rate (eGFR) is the CKD-EPI equation.        Lab Results   Component Value Date    HEPBSAG Non-reactive 05/22/2023    HEPBSAB <3.00 11/15/2022    HEPBSAB Non-reactive 11/15/2022    HEPBCAB Non-reactive 05/22/2023           MS Impression and Plan:     NEURO MULTIPLE SCLEROSIS IMPRESSION:   Number of relapses in the past year?:  0  Clinical Progression:  Clinically Stable  MRI Progression:  Stable  MS Classification:  Relapsing-Remitting MS  Current DMT: terifluomide  Implement Disease Modifying Therapy:  Teriflunomide  Symptom Management:  Implement change in symptom management  Implement Change in Symptom Management:  Fatigue  Implement Change in Symptom Management comment: Discussed importance of CPAP use to address her chronic fatigue  Additional Impressions: DALIA  Infections improved off Ocrevus  Labs in Jan / April - ordered   F/u Kailey Pes[facility] in 6 mo virtually    Problem List Items Addressed This Visit    None  Visit Diagnoses       MS (multiple sclerosis)    -  Primary    Relevant Orders    CBC Auto Differential    Hepatic Function Panel    Vitamin D    Vitamin D deficiency        Relevant Orders    Vitamin D    Prophylactic immunotherapy        Counseling regarding goals of care        Abnormal finding on MRI of brain                Nel Campos MD    I spent a total of 30 minutes on the day of the visit.This includes  face to face time and non-face to face time preparing to see the patient (eg, review of tests), obtaining and/or reviewing separately obtained history, documenting clinical information in the electronic or other health record, independently interpreting results and communicating results to the patient/family/caregiver, or care coordinator.  Visit today included increased complexity associated with the care of the episodic problem : chronic immunotherapy addressed and managing the longitudinal care of the patient due to the serious and/or complex managed problem(s) MS.

## 2024-11-06 NOTE — Clinical Note
1. CBC/LFT/Vit D in Jan - [facility] 2. CBC/LFT in April -[facility] F/u 6 mo VV with Kailey

## 2024-11-07 ENCOUNTER — TELEPHONE (OUTPATIENT)
Dept: NEUROLOGY | Facility: CLINIC | Age: 39
End: 2024-11-07
Payer: COMMERCIAL

## 2024-11-07 NOTE — TELEPHONE ENCOUNTER
----- Message from Nel Campos MD sent at 11/6/2024  9:18 AM CST -----  1. CBC/LFT/Vit D in Jan - [facility]  2. [facility] -[facility]  F/u 6 mo VV with Kailey

## 2024-11-12 ENCOUNTER — TELEPHONE (OUTPATIENT)
Dept: NEUROLOGY | Facility: CLINIC | Age: 39
End: 2024-11-12
Payer: COMMERCIAL

## 2024-11-12 NOTE — TELEPHONE ENCOUNTER
----- Message from Nel Campos MD sent at 11/6/2024  9:18 AM CST -----  1. CBC/LFT/Vit D in Jan - [facility]  2. [facility] April -[facility]  F/u 6 mo VV with Kailey

## 2024-11-14 ENCOUNTER — PATIENT MESSAGE (OUTPATIENT)
Dept: NEUROLOGY | Facility: CLINIC | Age: 39
End: 2024-11-14
Payer: COMMERCIAL

## 2024-11-14 ENCOUNTER — TELEPHONE (OUTPATIENT)
Dept: NEUROLOGY | Facility: CLINIC | Age: 39
End: 2024-11-14
Payer: COMMERCIAL

## 2024-12-09 ENCOUNTER — OFFICE VISIT (OUTPATIENT)
Dept: INTERNAL MEDICINE | Facility: CLINIC | Age: 39
End: 2024-12-09
Payer: COMMERCIAL

## 2024-12-09 VITALS
HEART RATE: 108 BPM | BODY MASS INDEX: 55.57 KG/M2 | OXYGEN SATURATION: 96 % | RESPIRATION RATE: 18 BRPM | DIASTOLIC BLOOD PRESSURE: 86 MMHG | SYSTOLIC BLOOD PRESSURE: 130 MMHG | HEIGHT: 60 IN | WEIGHT: 283.06 LBS

## 2024-12-09 DIAGNOSIS — Z90.710 S/P COMPLETE HYSTERECTOMY: ICD-10-CM

## 2024-12-09 DIAGNOSIS — E66.01 CLASS 3 SEVERE OBESITY DUE TO EXCESS CALORIES WITH SERIOUS COMORBIDITY AND BODY MASS INDEX (BMI) OF 50.0 TO 59.9 IN ADULT: ICD-10-CM

## 2024-12-09 DIAGNOSIS — Z23 NEED FOR VACCINATION: ICD-10-CM

## 2024-12-09 DIAGNOSIS — G35 MULTIPLE SCLEROSIS: ICD-10-CM

## 2024-12-09 DIAGNOSIS — G47.33 OSA (OBSTRUCTIVE SLEEP APNEA): ICD-10-CM

## 2024-12-09 DIAGNOSIS — I10 PRIMARY HYPERTENSION: ICD-10-CM

## 2024-12-09 DIAGNOSIS — Z76.89 ENCOUNTER TO ESTABLISH CARE: Primary | ICD-10-CM

## 2024-12-09 DIAGNOSIS — F41.8 DEPRESSION WITH ANXIETY: ICD-10-CM

## 2024-12-09 DIAGNOSIS — E66.813 CLASS 3 SEVERE OBESITY DUE TO EXCESS CALORIES WITH SERIOUS COMORBIDITY AND BODY MASS INDEX (BMI) OF 50.0 TO 59.9 IN ADULT: ICD-10-CM

## 2024-12-09 DIAGNOSIS — E03.9 HYPOTHYROIDISM, UNSPECIFIED TYPE: Chronic | ICD-10-CM

## 2024-12-09 PROBLEM — R35.1 NOCTURIA: Status: RESOLVED | Noted: 2019-12-18 | Resolved: 2024-12-09

## 2024-12-09 PROBLEM — K57.80 DIVERTICULITIS OF INTESTINE WITH PERFORATION: Status: RESOLVED | Noted: 2024-03-03 | Resolved: 2024-12-09

## 2024-12-09 PROBLEM — E87.6 HYPOKALEMIA: Status: RESOLVED | Noted: 2024-03-05 | Resolved: 2024-12-09

## 2024-12-09 PROBLEM — E63.9 INADEQUATE DIETARY ENERGY INTAKE: Status: RESOLVED | Noted: 2024-03-14 | Resolved: 2024-12-09

## 2024-12-09 PROBLEM — R10.31 RIGHT LOWER QUADRANT ABDOMINAL PAIN: Status: RESOLVED | Noted: 2024-03-03 | Resolved: 2024-12-09

## 2024-12-09 PROCEDURE — 99204 OFFICE O/P NEW MOD 45 MIN: CPT | Mod: 25,S$GLB,, | Performed by: INTERNAL MEDICINE

## 2024-12-09 PROCEDURE — 99999 PR PBB SHADOW E&M-EST. PATIENT-LVL III: CPT | Mod: PBBFAC,,, | Performed by: INTERNAL MEDICINE

## 2024-12-09 PROCEDURE — 4010F ACE/ARB THERAPY RXD/TAKEN: CPT | Mod: CPTII,S$GLB,, | Performed by: INTERNAL MEDICINE

## 2024-12-09 PROCEDURE — 3079F DIAST BP 80-89 MM HG: CPT | Mod: CPTII,S$GLB,, | Performed by: INTERNAL MEDICINE

## 2024-12-09 PROCEDURE — 3008F BODY MASS INDEX DOCD: CPT | Mod: CPTII,S$GLB,, | Performed by: INTERNAL MEDICINE

## 2024-12-09 PROCEDURE — 3075F SYST BP GE 130 - 139MM HG: CPT | Mod: CPTII,S$GLB,, | Performed by: INTERNAL MEDICINE

## 2024-12-09 PROCEDURE — 90656 IIV3 VACC NO PRSV 0.5 ML IM: CPT | Mod: S$GLB,,, | Performed by: INTERNAL MEDICINE

## 2024-12-09 PROCEDURE — 90471 IMMUNIZATION ADMIN: CPT | Mod: S$GLB,,, | Performed by: INTERNAL MEDICINE

## 2024-12-09 PROCEDURE — 1160F RVW MEDS BY RX/DR IN RCRD: CPT | Mod: CPTII,S$GLB,, | Performed by: INTERNAL MEDICINE

## 2024-12-09 PROCEDURE — 1159F MED LIST DOCD IN RCRD: CPT | Mod: CPTII,S$GLB,, | Performed by: INTERNAL MEDICINE

## 2024-12-09 PROCEDURE — 99499 UNLISTED E&M SERVICE: CPT | Mod: S$GLB,,, | Performed by: INTERNAL MEDICINE

## 2024-12-09 RX ORDER — LEVOTHYROXINE SODIUM 75 UG/1
75 TABLET ORAL
Qty: 90 TABLET | Refills: 3 | Status: SHIPPED | OUTPATIENT
Start: 2024-12-09

## 2024-12-09 RX ORDER — LISINOPRIL 10 MG/1
10 TABLET ORAL EVERY MORNING
Qty: 90 TABLET | Refills: 3 | Status: SHIPPED | OUTPATIENT
Start: 2024-12-09

## 2024-12-09 RX ORDER — LIOTHYRONINE SODIUM 5 UG/1
10 TABLET ORAL EVERY MORNING
Qty: 180 TABLET | Refills: 3 | Status: SHIPPED | OUTPATIENT
Start: 2024-12-09

## 2024-12-09 RX ORDER — BUPROPION HYDROCHLORIDE 300 MG/1
300 TABLET ORAL DAILY
Qty: 90 TABLET | Refills: 1 | Status: SHIPPED | OUTPATIENT
Start: 2024-12-09

## 2024-12-09 RX ORDER — ESCITALOPRAM OXALATE 20 MG/1
20 TABLET ORAL EVERY MORNING
Qty: 90 TABLET | Refills: 3 | Status: SHIPPED | OUTPATIENT
Start: 2024-12-09

## 2024-12-09 NOTE — PROGRESS NOTES
Subjective:       Patient ID: Sandra France is a 39 y.o. female.    Chief Complaint: Establish Care, Medication Refill, and Weight Loss      HPI:  Patient is new to clinic and presents to [facility]. She has HTN, MS, chronic migraine, hypothyroidism, depression and anxiety.  Labs from 10/25/24 by Dr. Campos personally reviewed, interpreted and discussed today.   LFTs normal  Plt 345, normal  H/H normla  WBC 11.98 normal      HTN: on lisinopril 10mg daily. BP well controlled. Denies med side effects.    MS: follows with Dr. Campos. On on teriflunomide. Uses gabapentin PRN for leg pain. Sx are currently stable. Neuro follow labs regarding meds.    Migraine: chronic headaches, neuro has attributed to MS. She was on topamax but weaned off over 1 year ago. She reports her headache is maintained.     Hypothyroidism: on liothyronine and levothyroxine. She has been on this combination for years and had been stable.   She thinks she has history of thyroid nodule. Diagnosed around age 19 yo.   Recently ran out of thyroid meds so needs refill today.     Depression/anxiety: on lexapro and wellbutrin. working well. Denies med side effects.     Obesity: she is interested in working on weight loss.  She was on mounjaro in 2022 for a few months. Had saving coupon but than ran out and was unable to afford. She did try compounded mounjaro and then developed diverticulitis and is now s/p partial colectomy due to complications from diverticulitis.     JOHN: She has a CPAP. She does admit to poor compliance but she is working on being more compliant to combat her fatigue.     S/p hysterectomy for h/o endometriosis in 2024;  still has ovaries  S/p partial colectomy for h/o diverticulitis June 2024.     Tobacco use: 4 cigarettes a day. Working on quitting.  smokes so makes it more difficulty  EtOH use: denies daily use  Illicit drug use: denies use          Past Medical History:   Diagnosis Date    Anemia     Anxiety      "Anxiety     Back pain     Bleeds easily     Change in bowel habit     Changes in skin texture     Confusion     related to MS    Depression     Depression     Digestive disorder     GERD    Diverticular disease of large intestine without perforation or abscess     Dizziness     Dry skin     Fatigue     Frequent urination     Has daytime drowsiness     Headache     Heartburn     History of weakness of extremity     HTN (hypertension)     Hx of psychiatric care     Insomnia     Kidney stone     Lethargy     Loose stools     Memory loss     Migraine     MS (multiple sclerosis)     Multiple sclerosis     Neck pain     Neuropathy     Numbness of extremity     Optic neuritis     Resolved    PCOS (polycystic ovarian syndrome)     Psychiatric problem     Rash     Restless sleeper     Sleep apnea     Doest Not use CPAP    Stomach pain     Therapy     Thyroid disease     Hypothyroid    Tingling in extremities     Tiredness     Trouble walking     Resolved    Unsteady gait     Urine troubles     Voiding dysfunction     "pelvic floor issues" not on any medications    Wears glasses     Wheezing     Worries        Family History   Problem Relation Name Age of Onset    Depression Mother      Hypothyroidism Mother      Hypertension Father         Social History     Socioeconomic History    Marital status:    Tobacco Use    Smoking status: Some Days     Current packs/day: 0.25     Average packs/day: 0.3 packs/day for 13.1 years (3.3 ttl pk-yrs)     Types: Cigarettes     Start date: 8/17/2020     Passive exposure: Current    Smokeless tobacco: Never    Tobacco comments:     Began smoking end of 2022.currently smoking 2-3 cigarettes/day.   Substance and Sexual Activity    Alcohol use: No     Comment: rarely    Drug use: Never    Sexual activity: Yes     Partners: Male     Birth control/protection: I.U.D., OCP     Comment:      Social Drivers of Health     Financial Resource Strain: Low Risk  (6/18/2024)    Overall " Financial Resource Strain (CARDIA)     Difficulty of Paying Living Expenses: Not hard at all   Food Insecurity: No Food Insecurity (6/18/2024)    Hunger Vital Sign     Worried About Running Out of Food in the Last Year: Never true     Ran Out of Food in the Last Year: Never true   Transportation Needs: No Transportation Needs (6/18/2024)    TRANSPORTATION NEEDS     Transportation : No   Physical Activity: Inactive (2/21/2024)    Exercise Vital Sign     Days of Exercise per Week: 0 days     Minutes of Exercise per Session: 0 min   Stress: No Stress Concern Present (3/11/2024)    [employer] - Occupational Stress Questionnaire     Feeling of Stress : Not at all   Recent Concern: Stress - Stress Concern Present (3/4/2024)    [employer] - Occupational Stress Questionnaire     Feeling of Stress : To some extent   Housing Stability: Low Risk  (6/18/2024)    Housing Stability Vital Sign     Unable to Pay for Housing in the Last Year: No     Homeless in the Last Year: No       Review of Systems   Constitutional:  Positive for fatigue. Negative for activity change, fever and unexpected weight change.   HENT:  Negative for congestion, ear pain, hearing loss, rhinorrhea and sore throat.    Eyes:  Negative for redness and visual disturbance.   Respiratory:  Negative for cough, shortness of breath and wheezing.    Cardiovascular:  Negative for chest pain, palpitations and leg swelling.   Gastrointestinal:  Negative for abdominal pain, constipation, diarrhea, nausea and vomiting.   Genitourinary:  Negative for dysuria and frequency.   Musculoskeletal:  Negative for back pain, joint swelling and neck pain.   Skin:  Negative for color change, rash and wound.   Neurological:  Positive for numbness (intermittent neuro leg pain related tp MS) and headaches. Negative for dizziness and light-headedness.         Objective:      Physical Exam  Vitals reviewed.   Constitutional:        General: She is not in acute distress.     Appearance: She is well-developed. She is obese.   HENT:      Head: Normocephalic and atraumatic.      Right Ear: External ear normal.      Left Ear: External ear normal.      Nose: Nose normal.   Eyes:      General:         Right eye: No discharge.         Left eye: No discharge.      Conjunctiva/sclera: Conjunctivae normal.   Neck:      Thyroid: No thyromegaly.   Cardiovascular:      Rate and Rhythm: Normal rate and regular rhythm.      Heart sounds: No murmur heard.  Pulmonary:      Effort: Pulmonary effort is normal. No respiratory distress.      Breath sounds: Normal breath sounds.   Abdominal:      General: There is no distension.      Palpations: Abdomen is soft.      Tenderness: There is no abdominal tenderness.   Skin:     General: Skin is warm and dry.   Neurological:      Mental Status: She is alert and oriented to person, place, and time.   Psychiatric:         Behavior: Behavior normal.         Thought Content: Thought content normal.         Assessment:       1. Encounter to establish care    2. Primary hypertension    3. Multiple sclerosis    4. Depression with anxiety    5. Hypothyroidism, unspecified type    6. JOHN (obstructive sleep apnea)    7. S/P complete hysterectomy        Plan:       1. Encounter to establish care  Meds reconciled  Problem list reviewed and updated  PMH, PSH SH and FH reviewed    2. Primary hypertension  Chronic controlled  Continue medications at same dose  Low Na diet  Exercise, weight loss  Check BP and keep log for next visit  Update yarly labs per orders  -     CBC Auto Differential; Future; Expected date: 12/09/2024  -     Comprehensive Metabolic Panel; Future; Expected date: 12/09/2024  -     TSH; Future; Expected date: 12/09/2024  -     Lipid Panel; Future; Expected date: 12/09/2024  -     T4, Free; Future; Expected date: 12/09/2024  -     Hemoglobin A1C; Future; Expected date: 12/09/2024  -     lisinopriL 10 MG tablet;  Take 1 tablet (10 mg total) by mouth every morning.  Dispense: 90 tablet; Refill: 3    3. Multiple sclerosis  Chronic stable  Med management per neurology    4. Depression with anxiety  Chronic controlled   Cont lexapro and wellbutrin same dose  -     CBC Auto Differential; Future; Expected date: 12/09/2024  -     Comprehensive Metabolic Panel; Future; Expected date: 12/09/2024  -     TSH; Future; Expected date: 12/09/2024  -     Lipid Panel; Future; Expected date: 12/09/2024  -     T4, Free; Future; Expected date: 12/09/2024  -     Hemoglobin A1C; Future; Expected date: 12/09/2024  -     EScitalopram oxalate (LEXAPRO) 20 MG tablet; Take 1 tablet (20 mg total) by mouth every morning.  Dispense: 90 tablet; Refill: 3  -     buPROPion (WELLBUTRIN XL) 300 MG 24 hr tablet; Take 1 tablet (300 mg total) by mouth once daily.  Dispense: 90 tablet; Refill: 1    5. Hypothyroidism, unspecified type  Chronic stable  Has been well controlled on current meds--refilled  Will resume meds and do labs back on meds for a few weeks for more accurate results  -     CBC Auto Differential; Future; Expected date: 12/09/2024  -     Comprehensive Metabolic Panel; Future; Expected date: 12/09/2024  -     TSH; Future; Expected date: 12/09/2024  -     Lipid Panel; Future; Expected date: 12/09/2024  -     T4, Free; Future; Expected date: 12/09/2024  -     Hemoglobin A1C; Future; Expected date: 12/09/2024  -     liothyronine (CYTOMEL) 5 MCG Tab; Take 2 tablets (10 mcg total) by mouth every morning.  Dispense: 180 tablet; Refill: 3  -     levothyroxine (SYNTHROID) 75 MCG tablet; Take 1 tablet (75 mcg total) by mouth before breakfast.  Dispense: 90 tablet; Refill: 3    6. JOHN (obstructive sleep apnea)  Chronic stable  Increasing CPAP compliance  Weight loss    7. S/P complete hysterectomy  History noted    8. Class 3 severe obesity due to excess calories with serious comorbidity and body mass index (BMI) of 50.0 to 59.9 in adult  Spent > 20 mins on  diet and exercise counseling  GLP-1 is cost prohibitive due to insurance coverage  Udpate labs per orders  Diet discussed  Exercise discussed  Will follow weight  + JOHN, + HTN  May, in the future, consider weight loss surgery       RTC 1 year for routine and PRN pending labs

## 2024-12-16 ENCOUNTER — PATIENT MESSAGE (OUTPATIENT)
Dept: PSYCHIATRY | Facility: CLINIC | Age: 39
End: 2024-12-16
Payer: COMMERCIAL

## 2024-12-27 ENCOUNTER — LAB VISIT (OUTPATIENT)
Dept: LAB | Facility: HOSPITAL | Age: 39
End: 2024-12-27
Attending: INTERNAL MEDICINE
Payer: COMMERCIAL

## 2024-12-27 DIAGNOSIS — I10 PRIMARY HYPERTENSION: ICD-10-CM

## 2024-12-27 DIAGNOSIS — F41.8 DEPRESSION WITH ANXIETY: ICD-10-CM

## 2024-12-27 DIAGNOSIS — E03.9 HYPOTHYROIDISM, UNSPECIFIED TYPE: Chronic | ICD-10-CM

## 2024-12-27 LAB
ALBUMIN SERPL BCP-MCNC: 3.2 G/DL (ref 3.5–5.2)
ALP SERPL-CCNC: 108 U/L (ref 40–150)
ALT SERPL W/O P-5'-P-CCNC: 41 U/L (ref 10–44)
ANION GAP SERPL CALC-SCNC: 9 MMOL/L (ref 8–16)
AST SERPL-CCNC: 22 U/L (ref 10–40)
BASOPHILS # BLD AUTO: 0.1 K/UL (ref 0–0.2)
BASOPHILS NFR BLD: 0.9 % (ref 0–1.9)
BILIRUB SERPL-MCNC: 0.5 MG/DL (ref 0.1–1)
BUN SERPL-MCNC: 8 MG/DL (ref 6–20)
CALCIUM SERPL-MCNC: 8.8 MG/DL (ref 8.7–10.5)
CHLORIDE SERPL-SCNC: 103 MMOL/L (ref 95–110)
CHOLEST SERPL-MCNC: 237 MG/DL (ref 120–199)
CHOLEST/HDLC SERPL: 7.4 {RATIO} (ref 2–5)
CO2 SERPL-SCNC: 25 MMOL/L (ref 23–29)
CREAT SERPL-MCNC: 0.7 MG/DL (ref 0.5–1.4)
DIFFERENTIAL METHOD BLD: ABNORMAL
EOSINOPHIL # BLD AUTO: 0.4 K/UL (ref 0–0.5)
EOSINOPHIL NFR BLD: 3.6 % (ref 0–8)
ERYTHROCYTE [DISTWIDTH] IN BLOOD BY AUTOMATED COUNT: 13.2 % (ref 11.5–14.5)
EST. GFR  (NO RACE VARIABLE): >60 ML/MIN/1.73 M^2
ESTIMATED AVG GLUCOSE: 217 MG/DL (ref 68–131)
GLUCOSE SERPL-MCNC: 232 MG/DL (ref 70–110)
HBA1C MFR BLD: 9.2 % (ref 4–5.6)
HCT VFR BLD AUTO: 42.9 % (ref 37–48.5)
HDLC SERPL-MCNC: 32 MG/DL (ref 40–75)
HDLC SERPL: 13.5 % (ref 20–50)
HGB BLD-MCNC: 14.2 G/DL (ref 12–16)
IMM GRANULOCYTES # BLD AUTO: 0.08 K/UL (ref 0–0.04)
IMM GRANULOCYTES NFR BLD AUTO: 0.7 % (ref 0–0.5)
LDLC SERPL CALC-MCNC: ABNORMAL MG/DL (ref 63–159)
LYMPHOCYTES # BLD AUTO: 3 K/UL (ref 1–4.8)
LYMPHOCYTES NFR BLD: 26.7 % (ref 18–48)
MCH RBC QN AUTO: 28.5 PG (ref 27–31)
MCHC RBC AUTO-ENTMCNC: 33.1 G/DL (ref 32–36)
MCV RBC AUTO: 86 FL (ref 82–98)
MONOCYTES # BLD AUTO: 0.8 K/UL (ref 0.3–1)
MONOCYTES NFR BLD: 6.6 % (ref 4–15)
NEUTROPHILS # BLD AUTO: 7 K/UL (ref 1.8–7.7)
NEUTROPHILS NFR BLD: 61.5 % (ref 38–73)
NONHDLC SERPL-MCNC: 205 MG/DL
NRBC BLD-RTO: 0 /100 WBC
PLATELET # BLD AUTO: 378 K/UL (ref 150–450)
PMV BLD AUTO: 9.6 FL (ref 9.2–12.9)
POTASSIUM SERPL-SCNC: 4.3 MMOL/L (ref 3.5–5.1)
PROT SERPL-MCNC: 6.5 G/DL (ref 6–8.4)
RBC # BLD AUTO: 4.98 M/UL (ref 4–5.4)
SODIUM SERPL-SCNC: 137 MMOL/L (ref 136–145)
T4 FREE SERPL-MCNC: 0.97 NG/DL (ref 0.71–1.51)
TRIGL SERPL-MCNC: 444 MG/DL (ref 30–150)
TSH SERPL DL<=0.005 MIU/L-ACNC: 3.54 UIU/ML (ref 0.4–4)
WBC # BLD AUTO: 11.33 K/UL (ref 3.9–12.7)

## 2024-12-27 PROCEDURE — 80061 LIPID PANEL: CPT | Performed by: INTERNAL MEDICINE

## 2024-12-27 PROCEDURE — 36415 COLL VENOUS BLD VENIPUNCTURE: CPT | Performed by: INTERNAL MEDICINE

## 2024-12-27 PROCEDURE — 84443 ASSAY THYROID STIM HORMONE: CPT | Performed by: INTERNAL MEDICINE

## 2024-12-27 PROCEDURE — 84439 ASSAY OF FREE THYROXINE: CPT | Performed by: INTERNAL MEDICINE

## 2024-12-27 PROCEDURE — 80053 COMPREHEN METABOLIC PANEL: CPT | Performed by: INTERNAL MEDICINE

## 2024-12-27 PROCEDURE — 83036 HEMOGLOBIN GLYCOSYLATED A1C: CPT | Performed by: INTERNAL MEDICINE

## 2024-12-27 PROCEDURE — 85025 COMPLETE CBC W/AUTO DIFF WBC: CPT | Performed by: INTERNAL MEDICINE

## 2025-01-15 ENCOUNTER — OFFICE VISIT (OUTPATIENT)
Dept: INTERNAL MEDICINE | Facility: CLINIC | Age: 40
End: 2025-01-15
Payer: COMMERCIAL

## 2025-01-15 VITALS
OXYGEN SATURATION: 96 % | WEIGHT: 282.44 LBS | SYSTOLIC BLOOD PRESSURE: 122 MMHG | BODY MASS INDEX: 56.09 KG/M2 | HEART RATE: 101 BPM | DIASTOLIC BLOOD PRESSURE: 88 MMHG

## 2025-01-15 DIAGNOSIS — I10 HYPERTENSION, UNSPECIFIED TYPE: Chronic | ICD-10-CM

## 2025-01-15 DIAGNOSIS — E11.65 TYPE 2 DIABETES MELLITUS WITH HYPERGLYCEMIA, WITHOUT LONG-TERM CURRENT USE OF INSULIN: Primary | ICD-10-CM

## 2025-01-15 DIAGNOSIS — E78.5 HYPERLIPIDEMIA, UNSPECIFIED HYPERLIPIDEMIA TYPE: ICD-10-CM

## 2025-01-15 DIAGNOSIS — E03.9 HYPOTHYROIDISM, UNSPECIFIED TYPE: Chronic | ICD-10-CM

## 2025-01-15 PROBLEM — R53.83 FATIGUE: Status: RESOLVED | Noted: 2020-08-19 | Resolved: 2025-01-15

## 2025-01-15 PROCEDURE — 3079F DIAST BP 80-89 MM HG: CPT | Mod: CPTII,S$GLB,, | Performed by: INTERNAL MEDICINE

## 2025-01-15 PROCEDURE — G2211 COMPLEX E/M VISIT ADD ON: HCPCS | Mod: S$GLB,,, | Performed by: INTERNAL MEDICINE

## 2025-01-15 PROCEDURE — 3074F SYST BP LT 130 MM HG: CPT | Mod: CPTII,S$GLB,, | Performed by: INTERNAL MEDICINE

## 2025-01-15 PROCEDURE — 1160F RVW MEDS BY RX/DR IN RCRD: CPT | Mod: CPTII,S$GLB,, | Performed by: INTERNAL MEDICINE

## 2025-01-15 PROCEDURE — 99215 OFFICE O/P EST HI 40 MIN: CPT | Mod: S$GLB,,, | Performed by: INTERNAL MEDICINE

## 2025-01-15 PROCEDURE — 99999 PR PBB SHADOW E&M-EST. PATIENT-LVL IV: CPT | Mod: PBBFAC,,, | Performed by: INTERNAL MEDICINE

## 2025-01-15 PROCEDURE — 1159F MED LIST DOCD IN RCRD: CPT | Mod: CPTII,S$GLB,, | Performed by: INTERNAL MEDICINE

## 2025-01-15 PROCEDURE — 3008F BODY MASS INDEX DOCD: CPT | Mod: CPTII,S$GLB,, | Performed by: INTERNAL MEDICINE

## 2025-01-15 RX ORDER — ATORVASTATIN CALCIUM 40 MG/1
40 TABLET, FILM COATED ORAL DAILY
Qty: 90 TABLET | Refills: 3 | Status: SHIPPED | OUTPATIENT
Start: 2025-01-15 | End: 2026-01-15

## 2025-01-15 RX ORDER — SEMAGLUTIDE 0.68 MG/ML
0.25 INJECTION, SOLUTION SUBCUTANEOUS
Qty: 4 EACH | Refills: 0 | Status: SHIPPED | OUTPATIENT
Start: 2025-01-15

## 2025-01-15 RX ORDER — METFORMIN HYDROCHLORIDE 500 MG/1
500 TABLET, EXTENDED RELEASE ORAL
Qty: 90 TABLET | Refills: 3 | Status: SHIPPED | OUTPATIENT
Start: 2025-01-15 | End: 2026-01-15

## 2025-01-15 NOTE — PROGRESS NOTES
"Subjective:       Patient ID: Sandra France is a 39 y.o. female.    Chief Complaint: Follow-up (Discuss A1C)      HPI:  Patient is known to me and presents to discuss diabetes. At last visit we did routine labs which showed A1C of 9.2%-- new onset diabetes. (Labs 12/27/24 personally reviewed, interpreted and discussed with patient today). , elevated. GFR > 60, noraml. She presents today to discuss medication management.  She reports she is working on lifestyle changes already including low carb, low sweets in diet.   BP remains stable.     Past Medical History:   Diagnosis Date    Anemia     Anxiety     Anxiety     Back pain     Bleeds easily     Change in bowel habit     Changes in skin texture     Confusion     related to MS    Depression     Depression     Digestive disorder     GERD    Diverticular disease of large intestine without perforation or abscess     Dizziness     Dry skin     Fatigue     Frequent urination     Has daytime drowsiness     Headache     Heartburn     History of weakness of extremity     HTN (hypertension)     Hx of psychiatric care     Insomnia     Kidney stone     Lethargy     Loose stools     Memory loss     Migraine     MS (multiple sclerosis)     Multiple sclerosis     Neck pain     Neuropathy     Numbness of extremity     Optic neuritis     Resolved    PCOS (polycystic ovarian syndrome)     Psychiatric problem     Rash     Restless sleeper     Sleep apnea     Doest Not use CPAP    Stomach pain     Therapy     Thyroid disease     Hypothyroid    Tingling in extremities     Tiredness     Trouble walking     Resolved    Unsteady gait     Urine troubles     Voiding dysfunction     "pelvic floor issues" not on any medications    Wears glasses     Wheezing     Worries        Family History   Problem Relation Name Age of Onset    Depression Mother      Hypothyroidism Mother      Hypertension Father         Social History     Socioeconomic History    Marital status:    Tobacco " Use    Smoking status: Some Days     Current packs/day: 0.25     Average packs/day: 0.3 packs/day for 13.2 years (3.3 ttl pk-yrs)     Types: Cigarettes     Start date: 8/17/2020     Passive exposure: Current    Smokeless tobacco: Never    Tobacco comments:     Began smoking end of 2022.currently smoking 2-3 cigarettes/day.   Substance and Sexual Activity    Alcohol use: No     Comment: rarely    Drug use: Never    Sexual activity: Yes     Partners: Male     Birth control/protection: I.U.D., OCP     Comment:      Social Drivers of Health     Financial Resource Strain: Low Risk  (6/18/2024)    Overall Financial Resource Strain (CARDIA)     Difficulty of Paying Living Expenses: Not hard at all   Food Insecurity: No Food Insecurity (6/18/2024)    Hunger Vital Sign     Worried About Running Out of Food in the Last Year: Never true     Ran Out of Food in the Last Year: Never true   Transportation Needs: No Transportation Needs (6/18/2024)    TRANSPORTATION NEEDS     Transportation : No   Physical Activity: Inactive (2/21/2024)    Exercise Vital Sign     Days of Exercise per Week: 0 days     Minutes of Exercise per Session: 0 min   Stress: No Stress Concern Present (3/11/2024)    [employer] - Occupational Stress Questionnaire     Feeling of Stress : Not at all   Recent Concern: Stress - Stress Concern Present (3/4/2024)    [employer] - Occupational Stress Questionnaire     Feeling of Stress : To some extent   Housing Stability: Low Risk  (6/18/2024)    Housing Stability Vital Sign     Unable to Pay for Housing in the Last Year: No     Homeless in the Last Year: No       Review of Systems   Constitutional:  Negative for activity change, fatigue, fever and unexpected weight change.   HENT:  Negative for congestion, ear pain, hearing loss, rhinorrhea and sore throat.    Eyes:  Negative for redness and visual disturbance.   Respiratory:  Negative for cough,  shortness of breath and wheezing.    Cardiovascular:  Negative for chest pain, palpitations and leg swelling.   Gastrointestinal:  Negative for abdominal pain, constipation, diarrhea, nausea and vomiting.   Genitourinary:  Negative for dysuria and frequency.   Musculoskeletal:  Negative for back pain, joint swelling and neck pain.   Skin:  Negative for color change, rash and wound.   Neurological:  Negative for dizziness, light-headedness and headaches.         Objective:      Physical Exam  Vitals reviewed.   Constitutional:       General: She is not in acute distress.     Appearance: She is well-developed. She is obese.   HENT:      Head: Normocephalic and atraumatic.      Right Ear: External ear normal.      Left Ear: External ear normal.      Nose: Nose normal.   Eyes:      General:         Right eye: No discharge.         Left eye: No discharge.      Conjunctiva/sclera: Conjunctivae normal.   Neck:      Thyroid: No thyromegaly.   Cardiovascular:      Rate and Rhythm: Normal rate and regular rhythm.      Heart sounds: No murmur heard.  Pulmonary:      Effort: Pulmonary effort is normal. No respiratory distress.      Breath sounds: Normal breath sounds. No wheezing.   Abdominal:      General: There is no distension.      Palpations: Abdomen is soft.      Tenderness: There is no abdominal tenderness.   Skin:     General: Skin is warm and dry.   Neurological:      Mental Status: She is alert and oriented to person, place, and time.   Psychiatric:         Behavior: Behavior normal.         Thought Content: Thought content normal.         Assessment:       1. Type 2 diabetes mellitus with hyperglycemia, without long-term current use of insulin    2. Hypertension, unspecified type    3. Hyperlipidemia, unspecified hyperlipidemia type    4. Hypothyroidism, unspecified type        Plan:       1. Type 2 diabetes mellitus with hyperglycemia, without long-term current use of insulin  New diagnosis  Start metformin and  ozempic  Side effects discussed today in detail  Start 0.25mg weekly for 4 weeks then 0.5mg weekly for 4 weeks then 1mg weekly for 4 weeks then follow up in clinic. Call me/see me ASAP if side effects develops  Check sugars (has meter) and keep log  ADA diet discussed  Labs in 3months with A1C    -     semaglutide (OZEMPIC) 0.25 mg or 0.5 mg (2 mg/3 mL) pen injector; Inject 0.25 mg into the skin every 7 days.  Dispense: 4 each; Refill: 0  -     metFORMIN (GLUCOPHAGE-XR) 500 MG ER 24hr tablet; Take 1 tablet (500 mg total) by mouth daily with breakfast.  Dispense: 90 tablet; Refill: 3  -     atorvastatin (LIPITOR) 40 MG tablet; Take 1 tablet (40 mg total) by mouth once daily.  Dispense: 90 tablet; Refill: 3  -     CBC Auto Differential; Future; Expected date: 04/15/2025  -     Hemoglobin A1C; Future; Expected date: 04/15/2025  -     Comprehensive Metabolic Panel; Future; Expected date: 04/15/2025  -     Lipid Panel; Future; Expected date: 04/15/2025    2. Hypertension, unspecified type  Chronic controlled  Continue medications at same dose  Low Na diet  Exercise, weight loss  Check BP and keep log for next visit    -     CBC Auto Differential; Future; Expected date: 04/15/2025  -     Hemoglobin A1C; Future; Expected date: 04/15/2025  -     Comprehensive Metabolic Panel; Future; Expected date: 04/15/2025  -     Lipid Panel; Future; Expected date: 04/15/2025    3. Hyperlipidemia, unspecified hyperlipidemia type  New problem  Start statin  Side effects discussed today  Repeat labs next visit  -     atorvastatin (LIPITOR) 40 MG tablet; Take 1 tablet (40 mg total) by mouth once daily.  Dispense: 90 tablet; Refill: 3  -     CBC Auto Differential; Future; Expected date: 04/15/2025  -     Hemoglobin A1C; Future; Expected date: 04/15/2025  -     Comprehensive Metabolic Panel; Future; Expected date: 04/15/2025  -     Lipid Panel; Future; Expected date: 04/15/2025    4. Hypothyroidism, unspecified type  Chronic controlled  Cont  meds same dose  TSH at goal  Monitor if weight loss on GLP-1  -     CBC Auto Differential; Future; Expected date: 04/15/2025  -     Hemoglobin A1C; Future; Expected date: 04/15/2025  -     Comprehensive Metabolic Panel; Future; Expected date: 04/15/2025  -     Lipid Panel; Future; Expected date: 04/15/2025         RTC 3 months with labs  Titrate up Ozempic every 4 weeks

## 2025-01-22 DIAGNOSIS — G35 MS (MULTIPLE SCLEROSIS): ICD-10-CM

## 2025-01-23 RX ORDER — TERIFLUNOMIDE 14 MG/1
14 TABLET, FILM COATED ORAL DAILY
Qty: 90 TABLET | Refills: 0 | Status: SHIPPED | OUTPATIENT
Start: 2025-01-23 | End: 2026-01-23

## 2025-01-27 ENCOUNTER — PATIENT MESSAGE (OUTPATIENT)
Dept: NEUROLOGY | Facility: CLINIC | Age: 40
End: 2025-01-27
Payer: COMMERCIAL

## 2025-02-03 ENCOUNTER — PATIENT MESSAGE (OUTPATIENT)
Dept: INTERNAL MEDICINE | Facility: CLINIC | Age: 40
End: 2025-02-03
Payer: COMMERCIAL

## 2025-02-03 DIAGNOSIS — E11.65 TYPE 2 DIABETES MELLITUS WITH HYPERGLYCEMIA, WITHOUT LONG-TERM CURRENT USE OF INSULIN: ICD-10-CM

## 2025-02-04 RX ORDER — SEMAGLUTIDE 0.68 MG/ML
0.5 INJECTION, SOLUTION SUBCUTANEOUS
Qty: 4 EACH | Refills: 0 | Status: SHIPPED | OUTPATIENT
Start: 2025-02-04 | End: 2025-02-23 | Stop reason: SDUPTHER

## 2025-02-06 ENCOUNTER — TELEPHONE (OUTPATIENT)
Dept: PHARMACY | Facility: CLINIC | Age: 40
End: 2025-02-06
Payer: COMMERCIAL

## 2025-02-06 NOTE — TELEPHONE ENCOUNTER
[facility]/Population Health Chart Review & Patient Outreach Details For Medication Adherence Project    Reason for Outreach Encounter: 3rd Party payor non-compliance report ([employer], [employer]BS, [employer], etc)  2.  Patient Outreach Method: Reviewed patient chart   3.   Medication in question:    Hypertension Medications               lisinopriL 10 MG tablet Take 1 tablet (10 mg total) by mouth every morning.                   last filled  12/30/24 for 90 day supply      4.  Reviewed and or Updates Made To: Patient Chart  5. Outreach Outcomes and/or actions taken: Patient filled medication and is on track to be adherent  Additional Notes:

## 2025-02-23 DIAGNOSIS — E11.65 TYPE 2 DIABETES MELLITUS WITH HYPERGLYCEMIA, WITHOUT LONG-TERM CURRENT USE OF INSULIN: ICD-10-CM

## 2025-02-24 DIAGNOSIS — E11.65 TYPE 2 DIABETES MELLITUS WITH HYPERGLYCEMIA, WITHOUT LONG-TERM CURRENT USE OF INSULIN: ICD-10-CM

## 2025-02-24 RX ORDER — SEMAGLUTIDE 0.68 MG/ML
0.5 INJECTION, SOLUTION SUBCUTANEOUS
Qty: 4 EACH | Refills: 0 | Status: SHIPPED | OUTPATIENT
Start: 2025-02-24 | End: 2025-02-24

## 2025-02-24 RX ORDER — SEMAGLUTIDE 0.68 MG/ML
INJECTION, SOLUTION SUBCUTANEOUS
Qty: 12 ML | Refills: 0 | Status: SHIPPED | OUTPATIENT
Start: 2025-02-24

## 2025-02-24 NOTE — TELEPHONE ENCOUNTER
No care due was identified.  [facility] Embedded Care Due Messages. Reference number: [id number].   2/24/2025 9:29:49 AM CST

## 2025-02-24 NOTE — TELEPHONE ENCOUNTER
Refill Routing Note   Medication(s) are not appropriate for processing by [facility] for the following reason(s):        Clarification of medication (Rx) details    [facility] action(s):  Defer             Appointments  past 12m or future 3m with PCP    Date Provider   Last Visit   1/15/2025 Beth Huston MD   Next Visit   4/16/2025 Beth Huston MD   ED visits in past 90 days: 0        Note composed:2:50 PM 02/24/2025

## 2025-02-24 NOTE — TELEPHONE ENCOUNTER
No care due was identified.  [facility] Embedded Care Due Messages. Reference number: [id number].   2/23/2025 8:37:48 PM CST

## 2025-03-07 ENCOUNTER — PATIENT MESSAGE (OUTPATIENT)
Dept: PSYCHIATRY | Facility: CLINIC | Age: 40
End: 2025-03-07
Payer: COMMERCIAL

## 2025-03-24 ENCOUNTER — PATIENT MESSAGE (OUTPATIENT)
Dept: INTERNAL MEDICINE | Facility: CLINIC | Age: 40
End: 2025-03-24
Payer: COMMERCIAL

## 2025-03-24 DIAGNOSIS — E11.65 TYPE 2 DIABETES MELLITUS WITH HYPERGLYCEMIA, WITHOUT LONG-TERM CURRENT USE OF INSULIN: Primary | ICD-10-CM

## 2025-03-25 RX ORDER — SEMAGLUTIDE 1.34 MG/ML
1 INJECTION, SOLUTION SUBCUTANEOUS
Qty: 3 ML | Refills: 11 | Status: SHIPPED | OUTPATIENT
Start: 2025-03-25 | End: 2026-03-25

## 2025-03-25 RX ORDER — SEMAGLUTIDE 1.34 MG/ML
1 INJECTION, SOLUTION SUBCUTANEOUS
Qty: 3 ML | Refills: 11 | Status: SHIPPED | OUTPATIENT
Start: 2025-03-25 | End: 2025-03-25 | Stop reason: SDUPTHER

## 2025-03-25 NOTE — TELEPHONE ENCOUNTER
No care due was identified.  [facility] Embedded Care Due Messages. Reference number: [id number].   3/25/2025 1:07:53 PM CDT

## 2025-04-09 ENCOUNTER — LAB VISIT (OUTPATIENT)
Dept: LAB | Facility: HOSPITAL | Age: 40
End: 2025-04-09
Attending: INTERNAL MEDICINE
Payer: COMMERCIAL

## 2025-04-09 DIAGNOSIS — E78.5 HYPERLIPIDEMIA, UNSPECIFIED HYPERLIPIDEMIA TYPE: ICD-10-CM

## 2025-04-09 DIAGNOSIS — E03.9 HYPOTHYROIDISM, UNSPECIFIED TYPE: ICD-10-CM

## 2025-04-09 DIAGNOSIS — E11.65 TYPE 2 DIABETES MELLITUS WITH HYPERGLYCEMIA, WITHOUT LONG-TERM CURRENT USE OF INSULIN: ICD-10-CM

## 2025-04-09 DIAGNOSIS — I10 HYPERTENSION, UNSPECIFIED TYPE: ICD-10-CM

## 2025-04-09 LAB
ABSOLUTE EOSINOPHIL (OHS): 0.36 K/UL
ABSOLUTE MONOCYTE (OHS): 1.17 K/UL (ref 0.3–1)
ABSOLUTE NEUTROPHIL COUNT (OHS): 11.68 K/UL (ref 1.8–7.7)
ALBUMIN SERPL BCP-MCNC: 3.4 G/DL (ref 3.5–5.2)
ALP SERPL-CCNC: 106 UNIT/L (ref 40–150)
ALT SERPL W/O P-5'-P-CCNC: 26 UNIT/L (ref 10–44)
ANION GAP (OHS): 10 MMOL/L (ref 8–16)
AST SERPL-CCNC: 17 UNIT/L (ref 11–45)
BASOPHILS # BLD AUTO: 0.11 K/UL
BASOPHILS NFR BLD AUTO: 0.7 %
BILIRUB SERPL-MCNC: 0.5 MG/DL (ref 0.1–1)
BUN SERPL-MCNC: 13 MG/DL (ref 6–20)
CALCIUM SERPL-MCNC: 8.8 MG/DL (ref 8.7–10.5)
CHLORIDE SERPL-SCNC: 104 MMOL/L (ref 95–110)
CHOLEST SERPL-MCNC: 125 MG/DL (ref 120–199)
CHOLEST/HDLC SERPL: 4.6 {RATIO} (ref 2–5)
CO2 SERPL-SCNC: 23 MMOL/L (ref 23–29)
CREAT SERPL-MCNC: 0.7 MG/DL (ref 0.5–1.4)
EAG (OHS): 177 MG/DL (ref 68–131)
ERYTHROCYTE [DISTWIDTH] IN BLOOD BY AUTOMATED COUNT: 13.6 % (ref 11.5–14.5)
GFR SERPLBLD CREATININE-BSD FMLA CKD-EPI: >60 ML/MIN/1.73/M2
GLUCOSE SERPL-MCNC: 171 MG/DL (ref 70–110)
HBA1C MFR BLD: 7.8 % (ref 4–5.6)
HCT VFR BLD AUTO: 42.3 % (ref 37–48.5)
HDLC SERPL-MCNC: 27 MG/DL (ref 40–75)
HDLC SERPL: 21.6 % (ref 20–50)
HGB BLD-MCNC: 13.8 GM/DL (ref 12–16)
IMM GRANULOCYTES # BLD AUTO: 0.13 K/UL (ref 0–0.04)
IMM GRANULOCYTES NFR BLD AUTO: 0.8 % (ref 0–0.5)
LDLC SERPL CALC-MCNC: 54 MG/DL (ref 63–159)
LYMPHOCYTES # BLD AUTO: 2.66 K/UL (ref 1–4.8)
MCH RBC QN AUTO: 28 PG (ref 27–31)
MCHC RBC AUTO-ENTMCNC: 32.6 G/DL (ref 32–36)
MCV RBC AUTO: 86 FL (ref 82–98)
NONHDLC SERPL-MCNC: 98 MG/DL
NUCLEATED RBC (/100WBC) (OHS): 0 /100 WBC
PLATELET # BLD AUTO: 395 K/UL (ref 150–450)
PMV BLD AUTO: 9.7 FL (ref 9.2–12.9)
POTASSIUM SERPL-SCNC: 4.2 MMOL/L (ref 3.5–5.1)
PROT SERPL-MCNC: 6.6 GM/DL (ref 6–8.4)
RBC # BLD AUTO: 4.93 M/UL (ref 4–5.4)
RELATIVE EOSINOPHIL (OHS): 2.2 %
RELATIVE LYMPHOCYTE (OHS): 16.5 % (ref 18–48)
RELATIVE MONOCYTE (OHS): 7.3 % (ref 4–15)
RELATIVE NEUTROPHIL (OHS): 72.5 % (ref 38–73)
SODIUM SERPL-SCNC: 137 MMOL/L (ref 136–145)
TRIGL SERPL-MCNC: 220 MG/DL (ref 30–150)
WBC # BLD AUTO: 16.11 K/UL (ref 3.9–12.7)

## 2025-04-09 PROCEDURE — 80053 COMPREHEN METABOLIC PANEL: CPT

## 2025-04-09 PROCEDURE — 80061 LIPID PANEL: CPT

## 2025-04-09 PROCEDURE — 36415 COLL VENOUS BLD VENIPUNCTURE: CPT

## 2025-04-09 PROCEDURE — 85025 COMPLETE CBC W/AUTO DIFF WBC: CPT

## 2025-04-09 PROCEDURE — 83036 HEMOGLOBIN GLYCOSYLATED A1C: CPT

## 2025-04-10 ENCOUNTER — TELEPHONE (OUTPATIENT)
Dept: PHARMACY | Facility: CLINIC | Age: 40
End: 2025-04-10
Payer: COMMERCIAL

## 2025-04-10 NOTE — TELEPHONE ENCOUNTER
[facility]/Population Health Chart Review & Patient Outreach Details For Medication Adherence Project    Reason for Outreach Encounter: 3rd Party payor non-compliance report ([employer], [employer], C, etc)  2.  Patient Outreach Method: [employer] message  3.   Medication in question: lisinopril   LAST FILLED: 12/30/24 for 90 day supply  Hypertension Medications              lisinopriL 10 MG tablet Take 1 tablet (10 mg total) by mouth every morning.              4.  Reviewed and or Updates Made To: Patient Chart  5. Outreach Outcomes and/or actions taken: Sent inquiry to patient: Waiting for response.

## 2025-04-11 ENCOUNTER — RESULTS FOLLOW-UP (OUTPATIENT)
Dept: INTERNAL MEDICINE | Facility: CLINIC | Age: 40
End: 2025-04-11

## 2025-04-16 ENCOUNTER — OFFICE VISIT (OUTPATIENT)
Dept: INTERNAL MEDICINE | Facility: CLINIC | Age: 40
End: 2025-04-16
Payer: COMMERCIAL

## 2025-04-16 VITALS
DIASTOLIC BLOOD PRESSURE: 70 MMHG | HEIGHT: 60 IN | RESPIRATION RATE: 16 BRPM | HEART RATE: 95 BPM | OXYGEN SATURATION: 96 % | SYSTOLIC BLOOD PRESSURE: 128 MMHG | WEIGHT: 267.44 LBS | BODY MASS INDEX: 52.51 KG/M2

## 2025-04-16 DIAGNOSIS — I10 PRIMARY HYPERTENSION: Primary | Chronic | ICD-10-CM

## 2025-04-16 DIAGNOSIS — E11.65 TYPE 2 DIABETES MELLITUS WITH HYPERGLYCEMIA, WITHOUT LONG-TERM CURRENT USE OF INSULIN: ICD-10-CM

## 2025-04-16 DIAGNOSIS — G35 MULTIPLE SCLEROSIS: ICD-10-CM

## 2025-04-16 DIAGNOSIS — F41.8 DEPRESSION WITH ANXIETY: ICD-10-CM

## 2025-04-16 DIAGNOSIS — E03.9 HYPOTHYROIDISM, UNSPECIFIED TYPE: Chronic | ICD-10-CM

## 2025-04-16 PROCEDURE — 99999 PR PBB SHADOW E&M-EST. PATIENT-LVL IV: CPT | Mod: PBBFAC,,, | Performed by: INTERNAL MEDICINE

## 2025-04-16 PROCEDURE — 1160F RVW MEDS BY RX/DR IN RCRD: CPT | Mod: CPTII,S$GLB,, | Performed by: INTERNAL MEDICINE

## 2025-04-16 PROCEDURE — 1159F MED LIST DOCD IN RCRD: CPT | Mod: CPTII,S$GLB,, | Performed by: INTERNAL MEDICINE

## 2025-04-16 PROCEDURE — 3078F DIAST BP <80 MM HG: CPT | Mod: CPTII,S$GLB,, | Performed by: INTERNAL MEDICINE

## 2025-04-16 PROCEDURE — 3051F HG A1C>EQUAL 7.0%<8.0%: CPT | Mod: CPTII,S$GLB,, | Performed by: INTERNAL MEDICINE

## 2025-04-16 PROCEDURE — G2211 COMPLEX E/M VISIT ADD ON: HCPCS | Mod: S$GLB,,, | Performed by: INTERNAL MEDICINE

## 2025-04-16 PROCEDURE — 3074F SYST BP LT 130 MM HG: CPT | Mod: CPTII,S$GLB,, | Performed by: INTERNAL MEDICINE

## 2025-04-16 PROCEDURE — 3008F BODY MASS INDEX DOCD: CPT | Mod: CPTII,S$GLB,, | Performed by: INTERNAL MEDICINE

## 2025-04-16 PROCEDURE — 99214 OFFICE O/P EST MOD 30 MIN: CPT | Mod: S$GLB,,, | Performed by: INTERNAL MEDICINE

## 2025-04-16 PROCEDURE — 4010F ACE/ARB THERAPY RXD/TAKEN: CPT | Mod: CPTII,S$GLB,, | Performed by: INTERNAL MEDICINE

## 2025-04-16 RX ORDER — SEMAGLUTIDE 2.68 MG/ML
2 INJECTION, SOLUTION SUBCUTANEOUS
Qty: 3 ML | Refills: 11 | Status: SHIPPED | OUTPATIENT
Start: 2025-04-16 | End: 2026-04-16

## 2025-04-16 NOTE — PROGRESS NOTES
Subjective:       Patient ID: Sandra France is a 39 y.o. female.    Chief Complaint: Follow-up      HPI:  Patient is known to me and presents for follow-up chronic medical problems.  She has HTN, diabetes type 2, MS, chronic migraine, hypothyroidism, depression and anxiety.  Labs from 04/09/2025 personally reviewed, interpreted and discussed today.   LFTs normal  LDL 54, normal  Triglycerides 220, elevated but improving  GFR greater than 60, normal  A1c 7.8%, above goal but improved from 9.2% prior        HTN: on lisinopril 10mg daily. BP well controlled. Denies med side effects.    DM type 2:  Diagnosed at initial visit with A1C of 9.2%.  On metformin 500 mg daily and Ozempic 1 mg weekly.  She reports she is working on lifestyle changes already including low carb, low sweets in diet.   Denies medication side effects.  Does note her fasting glucose is improving but not less than 120 yet.     MS: follows with Dr. Campos. On on teriflunomide. Uses gabapentin PRN for leg pain. Sx are currently stable. Neuro follow labs regarding meds.     Migraine: chronic headaches, neuro has attributed to MS. She was on topamax but weaned off over 1 year ago. She reports her headache is maintained.      Hypothyroidism: on liothyronine and levothyroxine. She has been on this combination for years and had been stable.   She thinks she has history of thyroid nodule. Diagnosed around age 19 yo.      Depression/anxiety: on lexapro and wellbutrin. working well. Denies med side effects.      Obesity: she is interested in working on weight loss.  She was on mounjaro in 2022 for a few months. Had saving coupon but than ran out and was unable to afford. She did try compounded mounjaro and then developed diverticulitis and is now s/p partial colectomy due to complications from diverticulitis.   We started Ozempic for diabetes management last visit.  Currently tolerating well.  Weight down about 15 lb since starting medication     JOHN: She has  "a CPAP. She does admit to poor compliance but she is working on being more compliant to combat her fatigue.      S/p hysterectomy for h/o endometriosis in 2024;  still has ovaries  S/p partial colectomy for h/o diverticulitis June 2024.      Tobacco use: 4 cigarettes a day. Working on quitting.  smokes so makes it more difficulty  EtOH use: denies daily use  Illicit drug use: denies use    Past Medical History:   Diagnosis Date    Anemia     Anxiety     Anxiety     Back pain     Bleeds easily     Change in bowel habit     Changes in skin texture     Confusion     related to MS    Depression     Depression     Digestive disorder     GERD    Diverticular disease of large intestine without perforation or abscess     Dizziness     Dry skin     Fatigue     Frequent urination     Has daytime drowsiness     Headache     Heartburn     History of weakness of extremity     HTN (hypertension)     Hx of psychiatric care     Insomnia     Kidney stone     Lethargy     Loose stools     Memory loss     Migraine     MS (multiple sclerosis)     Multiple sclerosis     Neck pain     Neuropathy     Numbness of extremity     Optic neuritis     Resolved    PCOS (polycystic ovarian syndrome)     Psychiatric problem     Rash     Restless sleeper     Sleep apnea     Doest Not use CPAP    Stomach pain     Therapy     Thyroid disease     Hypothyroid    Tingling in extremities     Tiredness     Trouble walking     Resolved    Unsteady gait     Urine troubles     Voiding dysfunction     "pelvic floor issues" not on any medications    Wears glasses     Wheezing     Worries        Family History   Problem Relation Name Age of Onset    Depression Mother      Hypothyroidism Mother      Hypertension Father         Social History[1]    Review of Systems   Constitutional:  Negative for activity change, fatigue, fever and unexpected weight change.   HENT:  Negative for congestion, ear pain, hearing loss, rhinorrhea and sore throat.    Eyes:  " Negative for redness and visual disturbance.   Respiratory:  Negative for cough, shortness of breath and wheezing.    Cardiovascular:  Negative for chest pain, palpitations and leg swelling.   Gastrointestinal:  Negative for abdominal pain, constipation, diarrhea, nausea and vomiting.   Musculoskeletal:  Negative for back pain, joint swelling and neck pain.   Skin:  Negative for color change, rash and wound.   Neurological:  Negative for dizziness, light-headedness and headaches.         Objective:      Physical Exam  Vitals reviewed.   Constitutional:       General: She is not in acute distress.     Appearance: She is well-developed. She is obese.   HENT:      Head: Normocephalic and atraumatic.      Right Ear: External ear normal.      Left Ear: External ear normal.      Nose: Nose normal.   Eyes:      General:         Right eye: No discharge.         Left eye: No discharge.      Conjunctiva/sclera: Conjunctivae normal.   Neck:      Thyroid: No thyromegaly.   Cardiovascular:      Rate and Rhythm: Normal rate and regular rhythm.   Pulmonary:      Effort: Pulmonary effort is normal. No respiratory distress.   Skin:     General: Skin is warm and dry.   Neurological:      Mental Status: She is alert and oriented to person, place, and time.   Psychiatric:         Behavior: Behavior normal.         Thought Content: Thought content normal.         Assessment:       1. Primary hypertension    2. Hypothyroidism, unspecified type    3. Type 2 diabetes mellitus with hyperglycemia, without long-term current use of insulin    4. Depression with anxiety    5. Multiple sclerosis        Plan:       1. Primary hypertension  Chronic controlled  Continue medications same dose  Low-sodium diet  Check blood pressure and keep log  -     CBC Auto Differential; Future; Expected date: 07/15/2025  -     Comprehensive Metabolic Panel; Future; Expected date: 07/15/2025  -     Hemoglobin A1C; Future; Expected date: 07/15/2025  -     Lipid  Panel; Future; Expected date: 07/15/2025    2. Hypothyroidism, unspecified type  Chronic control  Continue medications same dose  Follow labs  -     CBC Auto Differential; Future; Expected date: 07/15/2025  -     Comprehensive Metabolic Panel; Future; Expected date: 07/15/2025  -     Hemoglobin A1C; Future; Expected date: 07/15/2025  -     Lipid Panel; Future; Expected date: 07/15/2025    3. Type 2 diabetes mellitus with hyperglycemia, without long-term current use of insulin  Chronic improving but not at goal  Increase Ozempic to 2 mg weekly  Side effective GLP-1 discussed again today  Continue metformin same dose  If fasting glucose not consistently less than 120 over the next 2 weeks send me a [employer] message.  We will increase metformin to a 1000 mg.  ADA diet  Check sugars and keep log  -     semaglutide (OZEMPIC) 2 mg/dose (8 mg/3 mL) PnIj; Inject 2 mg into the skin every 7 days.  Dispense: 3 mL; Refill: 11  -     CBC Auto Differential; Future; Expected date: 07/15/2025  -     Comprehensive Metabolic Panel; Future; Expected date: 07/15/2025  -     Hemoglobin A1C; Future; Expected date: 07/15/2025  -     Lipid Panel; Future; Expected date: 07/15/2025  -     Microalbumin/Creatinine Ratio, Urine; Future; Expected date: 07/15/2025    4. Depression with anxiety  Chronic stable  Continue current medications    5. Multiple sclerosis  Chronic stable  Follow up with Neurology as planned who is currently doing medication management       Return to clinic 3 months with labs and p.r.n.               [1]   Social History  Socioeconomic History    Marital status:    Tobacco Use    Smoking status: Some Days     Current packs/day: 0.25     Average packs/day: 0.3 packs/day for 13.5 years (3.4 ttl pk-yrs)     Types: Cigarettes     Start date: 8/17/2020     Passive exposure: Current    Smokeless tobacco: Never    Tobacco comments:     Began smoking end of 2022.currently smoking 2-3 cigarettes/day.   Substance and Sexual  Activity    Alcohol use: No     Comment: rarely    Drug use: Never    Sexual activity: Yes     Partners: Male     Birth control/protection: I.U.D., OCP     Comment:      Social Drivers of Health     Financial Resource Strain: Low Risk  (6/18/2024)    Overall Financial Resource Strain (CARDIA)     Difficulty of Paying Living Expenses: Not hard at all   Food Insecurity: No Food Insecurity (6/18/2024)    Hunger Vital Sign     Worried About Running Out of Food in the Last Year: Never true     Ran Out of Food in the Last Year: Never true   Transportation Needs: No Transportation Needs (6/18/2024)    TRANSPORTATION NEEDS     Transportation : No   Physical Activity: Inactive (2/21/2024)    Exercise Vital Sign     Days of Exercise per Week: 0 days     Minutes of Exercise per Session: 0 min   Stress: No Stress Concern Present (3/11/2024)    [employer] [place] [employer] - Occupational Stress Questionnaire     Feeling of Stress : Not at all   Recent Concern: Stress - Stress Concern Present (3/4/2024)    [place] - Occupational Stress Questionnaire     Feeling of Stress : To some extent   Housing Stability: Unknown (6/18/2024)    Housing Stability Vital Sign     Unable to Pay for Housing in the Last Year: No     Homeless in the Last Year: No

## 2025-04-25 ENCOUNTER — LAB VISIT (OUTPATIENT)
Dept: LAB | Facility: HOSPITAL | Age: 40
End: 2025-04-25
Attending: PSYCHIATRY & NEUROLOGY
Payer: COMMERCIAL

## 2025-04-25 DIAGNOSIS — G35 MS (MULTIPLE SCLEROSIS): ICD-10-CM

## 2025-04-25 DIAGNOSIS — E55.9 VITAMIN D DEFICIENCY: ICD-10-CM

## 2025-04-25 LAB
25(OH)D3+25(OH)D2 SERPL-MCNC: 86 NG/ML (ref 30–96)
ABSOLUTE EOSINOPHIL (OHS): 0.34 K/UL
ABSOLUTE MONOCYTE (OHS): 1.15 K/UL (ref 0.3–1)
ABSOLUTE NEUTROPHIL COUNT (OHS): 11.43 K/UL (ref 1.8–7.7)
ALBUMIN SERPL BCP-MCNC: 3.4 G/DL (ref 3.5–5.2)
ALP SERPL-CCNC: 116 UNIT/L (ref 40–150)
ALT SERPL W/O P-5'-P-CCNC: 25 UNIT/L (ref 10–44)
AST SERPL-CCNC: 14 UNIT/L (ref 11–45)
BASOPHILS # BLD AUTO: 0.09 K/UL
BASOPHILS NFR BLD AUTO: 0.6 %
BILIRUB DIRECT SERPL-MCNC: 0.2 MG/DL (ref 0.1–0.3)
BILIRUB SERPL-MCNC: 0.5 MG/DL (ref 0.1–1)
ERYTHROCYTE [DISTWIDTH] IN BLOOD BY AUTOMATED COUNT: 13.4 % (ref 11.5–14.5)
HCT VFR BLD AUTO: 41.2 % (ref 37–48.5)
HGB BLD-MCNC: 13.5 GM/DL (ref 12–16)
IMM GRANULOCYTES # BLD AUTO: 0.12 K/UL (ref 0–0.04)
IMM GRANULOCYTES NFR BLD AUTO: 0.7 % (ref 0–0.5)
LYMPHOCYTES # BLD AUTO: 3.01 K/UL (ref 1–4.8)
MCH RBC QN AUTO: 28.1 PG (ref 27–31)
MCHC RBC AUTO-ENTMCNC: 32.8 G/DL (ref 32–36)
MCV RBC AUTO: 86 FL (ref 82–98)
NUCLEATED RBC (/100WBC) (OHS): 0 /100 WBC
PLATELET # BLD AUTO: 409 K/UL (ref 150–450)
PMV BLD AUTO: 9.6 FL (ref 9.2–12.9)
PROT SERPL-MCNC: 6.5 GM/DL (ref 6–8.4)
RBC # BLD AUTO: 4.81 M/UL (ref 4–5.4)
RELATIVE EOSINOPHIL (OHS): 2.1 %
RELATIVE LYMPHOCYTE (OHS): 18.6 % (ref 18–48)
RELATIVE MONOCYTE (OHS): 7.1 % (ref 4–15)
RELATIVE NEUTROPHIL (OHS): 70.9 % (ref 38–73)
WBC # BLD AUTO: 16.14 K/UL (ref 3.9–12.7)

## 2025-04-25 PROCEDURE — 36415 COLL VENOUS BLD VENIPUNCTURE: CPT

## 2025-04-25 PROCEDURE — 82040 ASSAY OF SERUM ALBUMIN: CPT

## 2025-04-25 PROCEDURE — 85025 COMPLETE CBC W/AUTO DIFF WBC: CPT

## 2025-04-25 PROCEDURE — 82306 VITAMIN D 25 HYDROXY: CPT

## 2025-04-29 DIAGNOSIS — G35 MS (MULTIPLE SCLEROSIS): ICD-10-CM

## 2025-04-30 DIAGNOSIS — E11.9 TYPE 2 DIABETES MELLITUS WITHOUT COMPLICATION, UNSPECIFIED WHETHER LONG TERM INSULIN USE: ICD-10-CM

## 2025-04-30 RX ORDER — TERIFLUNOMIDE 14 MG/1
14 TABLET, FILM COATED ORAL DAILY
Qty: 90 TABLET | Refills: 0 | Status: SHIPPED | OUTPATIENT
Start: 2025-04-30 | End: 2026-04-30

## 2025-05-07 ENCOUNTER — PATIENT MESSAGE (OUTPATIENT)
Dept: INTERNAL MEDICINE | Facility: CLINIC | Age: 40
End: 2025-05-07
Payer: COMMERCIAL

## 2025-05-08 NOTE — TELEPHONE ENCOUNTER
No care due was identified.  [facility] Embedded Care Due Messages. Reference number: [id number].   5/08/2025 10:04:24 AM CDT

## 2025-05-09 RX ORDER — ONDANSETRON 8 MG/1
8 TABLET, ORALLY DISINTEGRATING ORAL 2 TIMES DAILY
Qty: 30 TABLET | Refills: 0 | Status: SHIPPED | OUTPATIENT
Start: 2025-05-09

## 2025-05-13 ENCOUNTER — PATIENT MESSAGE (OUTPATIENT)
Dept: PSYCHIATRY | Facility: CLINIC | Age: 40
End: 2025-05-13
Payer: COMMERCIAL

## 2025-05-13 NOTE — PROGRESS NOTES
"Subjective:          Patient ID: Sandra France is a 39 y.o. female who presents today for a routine virtual visit for MS. She was last seen in November by Dr. Campos. The history has been provided by the patient.     The patient location is: her home   The chief complaint leading to consultation is: MS     Visit type: audiovisual    Face to Face time with patient: 12 minutes   20 minutes of total time spent on the encounter, which includes face to face time and non-face to face time preparing to see the patient (eg, review of tests), Obtaining and/or reviewing separately obtained history, Documenting clinical information in the electronic or other health record, Independently interpreting results (not separately reported) and communicating results to the patient/family/caregiver, or Care coordination (not separately reported).     Each patient to whom he or she provides medical services by telemedicine is:  (1) informed of the relationship between the physician and patient and the respective role of any other health care provider with respect to management of the patient; and (2) notified that he or she may decline to receive medical services by telemedicine and may withdraw from such care at any time.    MS HPI:  DMT: Teriflunomide   Side effects from DMT? No  Taking vitamin D3 as recommended? Yes -  Dose: 10,000 units 5 days a week  She denies any recent infections.   She is working full-time.   She is not exercising much. She is taking Ozempic--lost about 25lbs, and her blood sugars have been better.   She denies any sense of relapse or new MS symptoms.   She does not wear CPAP nightly. She continues to struggle with wearing her CPAP. Her energy level is "not great, not terrible." It could be better, but it depends on the day. She can get through her workday.      Medications:  Current Outpatient Medications   Medication Sig    atorvastatin (LIPITOR) 40 MG tablet Take 1 tablet (40 mg total) by mouth once daily. "    buPROPion (WELLBUTRIN XL) 300 MG 24 hr tablet Take 1 tablet (300 mg total) by mouth once daily.    calcium carbonate/vitamin D3 (VITAMIN D-3 ORAL) Take 5,000 Units by mouth Daily. 2 tabs    EScitalopram oxalate (LEXAPRO) 20 MG tablet Take 1 tablet (20 mg total) by mouth every morning.    gabapentin (NEURONTIN) 100 MG capsule Take 1 capsule (100 mg total) by mouth nightly as needed.    levothyroxine (SYNTHROID) 75 MCG tablet Take 1 tablet (75 mcg total) by mouth before breakfast.    liothyronine (CYTOMEL) 5 MCG Tab Take 2 tablets (10 mcg total) by mouth every morning.    lisinopriL 10 MG tablet Take 1 tablet (10 mg total) by mouth every morning.    metFORMIN (GLUCOPHAGE-XR) 500 MG ER 24hr tablet Take 1 tablet (500 mg total) by mouth daily with breakfast.    ondansetron (ZOFRAN-ODT) 8 MG TbDL Take 1 tablet (8 mg total) by mouth 2 (two) times daily.    semaglutide (OZEMPIC) 2 mg/dose (8 mg/3 mL) PnIj Inject 2 mg into the skin every 7 days.    teriflunomide (AUBAGIO) 14 mg Tab Take 14 mg by mouth once daily.     No current facility-administered medications for this visit.       SOCIAL HISTORY  Social History[1]    Living arrangements - the patient lives with her family     ROS:      5/16/25    REVIEW OF SYMPTOMS   Do you feel abnormally tired on most days? Yes   Do you feel you generally sleep well? No   Do you have difficulty controlling your bladder?  No   Do you have difficulty controlling your bowels?  No   Do you have frequent muscle cramps, tightness or spasms in your limbs?  No--not regularly; no change   Do you have new visual symptoms?  No--due to see eye doctor soon    Do you have worsening difficulty with your memory or thinking? No   Do you have worsening symptoms of anxiety or depression?  No--controlled on Lexapro    For patients who walk, Do you have more difficulty walking?  No   Have you fallen since your last visit?  No   For patients who use wheelchairs: Do you have any skin wounds or breakdown?  Not Applicable   Do you have difficulty using your hands?  No--sometimes feels like  is not as good; drops things sometimes, but function is overall good    Do you have shooting or burning pain? No   Do you have difficulty with sexual function?  Not assessed    If you are sexually active, are you using birth control? Y/N  N/A No   Do you often choke when swallowing liquids or solid food?  No   Do you experience worsening symptoms when overheated? Yes--has a cooling vest; avoids the extreme heat when she can    Do you need any new equipment such as a wheelchair, walker or shower chair? No   Do you receive co-pay financial assistance for your principal MS medicine? No--[facility] pharmacy    Would you be interested in participating in an MS research trial in the future? Yes   For patients on Gilenya, Tecfidera, Aubagio, Rituxan, Ocrevus, Tysabri, Lemtrada or Methotrexate, are you aware that you should NOT receive live virus vaccines?  Yes   Do you feel you have adequate family/friend support?  Yes   Do you have health insurance?   Yes   Are you currently employed? Yes   Do you receive SSDI/SSI?  Not Applicable   Do you use marijuana or cannabis products? No   Have you been diagnosed with a urinary tract infection since your last visit here? No   Have you been diagnosed with a respiratory tract infection since your last visit here? No   Have you been to the emergency room since your last visit here? No    Have you been hospitalized since your last visit here?  No              Objective:        1. 25 foot timed walk:      10/16/2023     9:00 AM 11/6/2024     9:00 AM   Timed 25 Foot Walk:   Did patient wear an AFO? No No   Was assistive device used? No No   Time for 25 Foot Walk (seconds) 4.5 4.5   Time for 25 Foot Walk (seconds) 4.4        Neurological Exam    Deferred   Imaging:     Results for orders placed during the hospital encounter of 10/25/24    MRI Brain Demyelinating Without Contrast    Impression  Brain  appears unchanged from prior exam, again demonstrating findings compatible with the reported history of multiple sclerosis.  No new discrete lesions to indicate ongoing demyelination.      Electronically signed by: Michael Sesay MD  Date:    10/25/2024  Time:    09:53      Labs:     Lab Results   Component Value Date    EYPLNBGN48US 86 04/25/2025    TRZFXFNO13HU 80 05/22/2023    ZNCWAXBC55DE 69 05/20/2022     Lab Results   Component Value Date    JCVINDEX 0.12 05/14/2020    JCVANTIBODY Negative 05/14/2020     Lab Results   Component Value Date    PE1VHFHH 81.0 05/14/2020    ABSOLUTECD3 5009.0 (H) 05/14/2020    DV9BXUIA 21.9 05/14/2020    ABSOLUTECD8 1351.0 (H) 05/14/2020    KQ3ZSHIK 58.7 (H) 05/14/2020    ABSOLUTECD4 3629.0 (H) 05/14/2020    LABCD48 2.69 05/14/2020     Lab Results   Component Value Date    WBC 16.14 (H) 04/25/2025    RBC 4.81 04/25/2025    HGB 13.5 04/25/2025    HCT 41.2 04/25/2025    MCV 86 04/25/2025    MCH 28.1 04/25/2025    MCHC 32.8 04/25/2025    RDW 13.4 04/25/2025     04/25/2025    MPV 9.6 04/25/2025    GRAN 7.0 12/27/2024    GRAN 61.5 12/27/2024    LYMPH 18.6 04/25/2025    LYMPH 3.01 04/25/2025    MONO 7.1 04/25/2025    MONO 1.15 (H) 04/25/2025    EOS 2.1 04/25/2025    EOS 0.34 04/25/2025    BASO 0.10 12/27/2024    EOSINOPHIL 3.6 12/27/2024    BASOPHIL 0.6 04/25/2025    BASOPHIL 0.09 04/25/2025     Sodium   Date Value Ref Range Status   04/09/2025 137 136 - 145 mmol/L Final   12/27/2024 137 136 - 145 mmol/L Final     Potassium   Date Value Ref Range Status   04/09/2025 4.2 3.5 - 5.1 mmol/L Final   12/27/2024 4.3 3.5 - 5.1 mmol/L Final     Chloride   Date Value Ref Range Status   04/09/2025 104 95 - 110 mmol/L Final   12/27/2024 103 95 - 110 mmol/L Final     CO2   Date Value Ref Range Status   04/09/2025 23 23 - 29 mmol/L Final   12/27/2024 25 23 - 29 mmol/L Final     Glucose   Date Value Ref Range Status   04/09/2025 171 (H) 70 - 110 mg/dL Final   12/27/2024 232 (H) 70 - 110 mg/dL  Final     BUN   Date Value Ref Range Status   04/09/2025 13 6 - 20 mg/dL Final     Creatinine   Date Value Ref Range Status   04/09/2025 0.7 0.5 - 1.4 mg/dL Final     Calcium   Date Value Ref Range Status   04/09/2025 8.8 8.7 - 10.5 mg/dL Final   12/27/2024 8.8 8.7 - 10.5 mg/dL Final     Protein Total   Date Value Ref Range Status   04/25/2025 6.5 6.0 - 8.4 gm/dL Final     Total Protein   Date Value Ref Range Status   12/27/2024 6.5 6.0 - 8.4 g/dL Final     Albumin   Date Value Ref Range Status   04/25/2025 3.4 (L) 3.5 - 5.2 g/dL Final   12/27/2024 3.2 (L) 3.5 - 5.2 g/dL Final     Total Bilirubin   Date Value Ref Range Status   12/27/2024 0.5 0.1 - 1.0 mg/dL Final     Comment:     For infants and newborns, interpretation of results should be based  on gestational age, weight and in agreement with clinical  observations.    Premature Infant recommended reference ranges:  Up to 24 hours.............<8.0 mg/dL  Up to 48 hours............<12.0 mg/dL  3-5 days..................<15.0 mg/dL  6-29 days.................<15.0 mg/dL       Bilirubin Total   Date Value Ref Range Status   04/25/2025 0.5 0.1 - 1.0 mg/dL Final     Comment:     For infants and newborns, interpretation of results should be based   on gestational age, weight and in agreement with clinical   observations.    Premature Infant recommended reference ranges:   0-24 hours:  <8.0 mg/dL   24-48 hours: <12.0 mg/dL   3-5 days:    <15.0 mg/dL   6-29 days:   <15.0 mg/dL     Alkaline Phosphatase   Date Value Ref Range Status   12/27/2024 108 40 - 150 U/L Final     ALP   Date Value Ref Range Status   04/25/2025 116 40 - 150 unit/L Final     AST   Date Value Ref Range Status   04/25/2025 14 11 - 45 unit/L Final   12/27/2024 22 10 - 40 U/L Final     ALT   Date Value Ref Range Status   04/25/2025 25 10 - 44 unit/L Final   12/27/2024 41 10 - 44 U/L Final     Anion Gap   Date Value Ref Range Status   04/09/2025 10 8 - 16 mmol/L Final     eGFR if    Date  Value Ref Range Status   05/14/2020 >60 >60 mL/min/1.73 m^2 Final     eGFR if non    Date Value Ref Range Status   05/14/2020 >60 >60 mL/min/1.73 m^2 Final     Comment:     Calculation used to obtain the estimated glomerular filtration  rate (eGFR) is the CKD-EPI equation.        Lab Results   Component Value Date    HEPBSAG Non-reactive 05/22/2023    HEPBSAB <3.00 11/15/2022    HEPBSAB Non-reactive 11/15/2022    HEPBCAB Non-reactive 05/22/2023       MS Impression and Plan:     NEURO MULTIPLE SCLEROSIS IMPRESSION:   Number of relapses in the past year?:  0  Clinical Progression:  Clinically Stable  MRI Progression:  Stable  MS Classification:  Relapsing-Remitting MS  Current DMT: terifluomide  Current DMT comment: Continue teriflunomide and Vit D. Safety labs are due in August.   DMT:  No change in management  Symptom Management:  Implement change in symptom management  Implement Change in Symptom Management:  Fatigue  Implement Change in Symptom Management comment: Will check B12 with next labs. She can start supplementing B12 500-1000mcg daily to see if this helps with energy.   Additional Impressions:   MRI brain and c-spine in October 2025  I will see her back in clinic in 6 months.   The visit today is associated with current or anticipated ongoing medical care related to this patient's single serious condition/complex condition of multiple sclerosis.        NICHELLE Navarro, CNS    Problem List Items Addressed This Visit          Neurologic Problems    Multiple sclerosis - Primary    Relevant Orders    MRI Brain Demyelinating Without Contrast    MRI Cervical Spine Demyelinating Without Contrast    CBC auto differential    Comprehensive Metabolic Panel    Vitamin B12       Other    JOHN (obstructive sleep apnea)    Class 3 severe obesity due to excess calories with serious comorbidity and body mass index (BMI) of 50.0 to 59.9 in adult     Other Visit Diagnoses         Counseling regarding  goals of care          Prophylactic immunotherapy          High risk medication use          Fatigue, unspecified type                       [1]   Social History  Tobacco Use    Smoking status: Some Days     Current packs/day: 0.25     Average packs/day: 0.3 packs/day for 13.5 years (3.4 ttl pk-yrs)     Types: Cigarettes     Start date: 8/17/2020     Passive exposure: Current    Smokeless tobacco: Never    Tobacco comments:     Began smoking end of 2022.currently smoking 2-3 cigarettes/day.   Substance Use Topics    Alcohol use: No     Comment: rarely    Drug use: Never

## 2025-05-14 ENCOUNTER — TELEPHONE (OUTPATIENT)
Dept: PHARMACY | Facility: CLINIC | Age: 40
End: 2025-05-14
Payer: COMMERCIAL

## 2025-05-14 NOTE — TELEPHONE ENCOUNTER
[facility]/Population Health Chart Review & Patient Outreach Details For Medication Adherence Project    Reason for Outreach Encounter: 3rd Party payor non-compliance report ([employer], [employer], C, etc)  2.  Patient Outreach Method: Reviewed Patient Chart  3.   Medication in question: lisinopril    LAST FILLED: 4/11/25 for 90 day supply  Hypertension Medications              lisinopriL 10 MG tablet Take 1 tablet (10 mg total) by mouth every morning.              4.  Reviewed and or Updates Made To: Patient Chart  5. Outreach Outcomes and/or actions taken: Patient filled medication and is on track to be adherent

## 2025-05-16 ENCOUNTER — OFFICE VISIT (OUTPATIENT)
Dept: NEUROLOGY | Facility: CLINIC | Age: 40
End: 2025-05-16
Payer: COMMERCIAL

## 2025-05-16 DIAGNOSIS — G47.33 OSA (OBSTRUCTIVE SLEEP APNEA): ICD-10-CM

## 2025-05-16 DIAGNOSIS — E66.813 CLASS 3 SEVERE OBESITY DUE TO EXCESS CALORIES WITH SERIOUS COMORBIDITY AND BODY MASS INDEX (BMI) OF 50.0 TO 59.9 IN ADULT: ICD-10-CM

## 2025-05-16 DIAGNOSIS — Z71.89 COUNSELING REGARDING GOALS OF CARE: ICD-10-CM

## 2025-05-16 DIAGNOSIS — E66.01 CLASS 3 SEVERE OBESITY DUE TO EXCESS CALORIES WITH SERIOUS COMORBIDITY AND BODY MASS INDEX (BMI) OF 50.0 TO 59.9 IN ADULT: ICD-10-CM

## 2025-05-16 DIAGNOSIS — Z29.89 PROPHYLACTIC IMMUNOTHERAPY: ICD-10-CM

## 2025-05-16 DIAGNOSIS — Z79.899 HIGH RISK MEDICATION USE: ICD-10-CM

## 2025-05-16 DIAGNOSIS — G35 MULTIPLE SCLEROSIS: Primary | ICD-10-CM

## 2025-05-16 DIAGNOSIS — R53.83 FATIGUE, UNSPECIFIED TYPE: ICD-10-CM

## 2025-05-19 ENCOUNTER — TELEPHONE (OUTPATIENT)
Dept: NEUROLOGY | Facility: CLINIC | Age: 40
End: 2025-05-19
Payer: COMMERCIAL

## 2025-05-19 NOTE — TELEPHONE ENCOUNTER
----- Message from Kailey Sommers sent at 5/16/2025 12:36 PM CDT -----  MRIs in October  Labs in August  F/U with me in November in clinic

## 2025-05-20 ENCOUNTER — TELEPHONE (OUTPATIENT)
Dept: NEUROLOGY | Facility: CLINIC | Age: 40
End: 2025-05-20
Payer: COMMERCIAL

## 2025-05-23 ENCOUNTER — TELEPHONE (OUTPATIENT)
Dept: NEUROLOGY | Facility: CLINIC | Age: 40
End: 2025-05-23
Payer: COMMERCIAL

## 2025-06-14 NOTE — PROGRESS NOTES
HPI: Sandra Castelan is a 33 y.o. female diagnosed with optic neuritis in her right eye in 6/2018 by OCT. Subsequent MRI brain 7/2018 shows multiple chronic demyelinating plaques concerning for Multiple Sclerosis. She has noted some left arm and leg numbness for years, dizziness, fatigue, mood disorder, and urinary symptoms. Imaging and cluster of symptoms are likely most consistent with relapsing and remitting MS given her lesions and symptoms   by time and space      The patient emailed about various questions and concerns after her diagnosis. She then saw psychiatry last month and was treated with Lexapro for anxiety and depression and her need for reassurance ceased.  She states she feels much better on her psychiatric medication change.  She is not as fatigued and not as nervous.  She does still have daily headache and some leg pains at times. Sometimes her headaches are severe. She has to get out of the light. She feels PRN meds help at times. Butterbur has not well helped.   No aura  She had no relief with Topamax prior.   She has no history of renal stones.    Review of Systems   Constitutional: Negative for fever.   HENT: Negative for nosebleeds.    Eyes: Negative for double vision.   Respiratory: Negative for hemoptysis.    Cardiovascular: Negative for leg swelling.   Gastrointestinal: Negative for blood in stool.   Genitourinary: Negative for hematuria.   Musculoskeletal: Negative for falls.   Skin: Negative for rash.   Neurological: Negative for seizures.   Psychiatric/Behavioral: The patient does not have insomnia.          I have reviewed all of this patient's past medical and surgical histories as well as family and social histories and active allergies and medications as documented in the electronic medical record.      Exam:  Gen Appearance, well developed/nourished in no apparent distress  CV: 2+ distal pulses with no edema or swelling  Neuro:  MS: Awake, alert, oriented to place, person,  time, situation. Sustains attention. Recent/remote memory intact, Language is full to spontaneous speech/repetition/naming/comprehension. Fund of Knowledge is full  CN: Optic discs are flat with normal vasculature, PERRL, Extraoccular movements and visual fields are full. Normal facial sensation and strength, Hearing symmetric, Tongue and Palate are midline and strong. Shoulder Shrug symmetric and strong.  Motor: Normal bulk, tone, no abnormal movements. 5/5 strength bilateral upper/lower extremities with 2+ reflexes and bilateral plantar response  Sensory: symmetric to light touch, pain, temp, and vibration,  Romberg negative  Cerebellar: Finger-nose,Heal-shin, Rapid alternating movements intact  Gait: Normal stance, no ataxia    Imagin18 MRI brain: Multiple areas of increased white matter signal in a pattern suggestive of multiple sclerosis, measuring up to 1 cm in the right frontal periventricular region.  Nothing enhancing    18 MRI C spine: 1. No evidence demyelinating process within the cervical cord.  2. Mild disc bulges as above from C3-6, greatest at C5-6.    18 MRI T spine: The thoracic spine demonstrates a normal kyphosis.  There is disc desiccation and loss of disc height from T6-9.  There is a left paracentral disc herniation at T8-9 with contact and mild deformity of the left paracentral cord.  No other focal disc protrusion is noted.  No abnormal cord signal is seen.  No abnormal enhancement is seen within the thoracic cord.  Vertebral body marrow signal is unremarkable.     Labs: 2018 Lyme titers, FRENCH, hep panel, HiV, RPRP Ace-level, SSA/SSB antibodies, ESR, and paraneoplastic panel, CMP, CBC unremarkable  Vit D 39    CMP normal 10/2018  Vit D normal 10/2018    Assessment/Plan: Sandra Castelan is a 33 y.o. female diagnosed with optic neuritis in her right eye in 2018 by OCT. Subsequent MRI brain 2018 shows multiple chronic demyelinating plaques concerning for Multiple Sclerosis.  She has noted some left arm and leg numbness for years, dizziness, fatigue, mood disorder, and urinary symptoms. Imaging and cluster of symptoms are consistent with relapsing and remitting MS given her lesions and symptoms   by time and space  I recommend:   1. She was not given IV steroids at the time of acute ON, but symptoms improved.   2. Labs for MS mimics were normal  3. Continue Vit Supplementation for Vit D deficiency  4. She is treated for MS with Copaxone and is tolerating well. LP was not done as her symptoms are rather pathognomonic. Teratogenicity risks reviewed.   5. Plan to rescan with MRI studies in 7/2018 (brain)  6. Treatment with psychiatry is helping her anxiety and fatigue  7. Start zonegran  per orders for migraine without aura headache prevention Unless sedation, mood changes or other side effects. Teratogenicity risk reviewed.   Butterburr not very helpful. No relief with Topamax prior  8. Recheck CBC today given mild elevation in platlets prior  RTC 4 months             Simple: Patient demonstrates quick and easy understanding/Patient asked questions/Verbalized Understanding

## 2025-06-19 ENCOUNTER — PATIENT MESSAGE (OUTPATIENT)
Dept: PSYCHIATRY | Facility: CLINIC | Age: 40
End: 2025-06-19
Payer: COMMERCIAL

## 2025-06-28 DIAGNOSIS — F41.8 DEPRESSION WITH ANXIETY: ICD-10-CM

## 2025-06-28 RX ORDER — BUPROPION HYDROCHLORIDE 300 MG/1
300 TABLET ORAL
Qty: 90 TABLET | Refills: 3 | Status: SHIPPED | OUTPATIENT
Start: 2025-06-28

## 2025-06-28 NOTE — TELEPHONE ENCOUNTER
No care due was identified.  [facility] Embedded Care Due Messages. Reference number: [id number].   6/28/2025 4:12:56 PM CDT

## 2025-06-29 NOTE — TELEPHONE ENCOUNTER
Refill Decision Note   Sandra Román  is requesting a refill authorization.  Brief Assessment and Rationale for Refill:  Approve     Medication Therapy Plan:         Comments:     Note composed:9:02 PM 06/28/2025

## 2025-07-09 ENCOUNTER — LAB VISIT (OUTPATIENT)
Dept: LAB | Facility: HOSPITAL | Age: 40
End: 2025-07-09
Attending: INTERNAL MEDICINE
Payer: COMMERCIAL

## 2025-07-09 DIAGNOSIS — E11.65 TYPE 2 DIABETES MELLITUS WITH HYPERGLYCEMIA, WITHOUT LONG-TERM CURRENT USE OF INSULIN: ICD-10-CM

## 2025-07-09 DIAGNOSIS — E03.9 HYPOTHYROIDISM, UNSPECIFIED TYPE: ICD-10-CM

## 2025-07-09 DIAGNOSIS — I10 PRIMARY HYPERTENSION: ICD-10-CM

## 2025-07-09 LAB
ABSOLUTE EOSINOPHIL (OHS): 0.25 K/UL
ABSOLUTE MONOCYTE (OHS): 1.11 K/UL (ref 0.3–1)
ABSOLUTE NEUTROPHIL COUNT (OHS): 11.69 K/UL (ref 1.8–7.7)
ALBUMIN SERPL BCP-MCNC: 3.6 G/DL (ref 3.5–5.2)
ALP SERPL-CCNC: 110 UNIT/L (ref 40–150)
ALT SERPL W/O P-5'-P-CCNC: 24 UNIT/L (ref 10–44)
ANION GAP (OHS): 7 MMOL/L (ref 8–16)
AST SERPL-CCNC: 17 UNIT/L (ref 11–45)
BASOPHILS # BLD AUTO: 0.11 K/UL
BASOPHILS NFR BLD AUTO: 0.7 %
BILIRUB SERPL-MCNC: 0.7 MG/DL (ref 0.1–1)
BUN SERPL-MCNC: 12 MG/DL (ref 6–20)
CALCIUM SERPL-MCNC: 9.6 MG/DL (ref 8.7–10.5)
CHLORIDE SERPL-SCNC: 103 MMOL/L (ref 95–110)
CHOLEST SERPL-MCNC: 135 MG/DL (ref 120–199)
CHOLEST/HDLC SERPL: 4.5 {RATIO} (ref 2–5)
CO2 SERPL-SCNC: 25 MMOL/L (ref 23–29)
CREAT SERPL-MCNC: 0.7 MG/DL (ref 0.5–1.4)
EAG (OHS): 131 MG/DL (ref 68–131)
ERYTHROCYTE [DISTWIDTH] IN BLOOD BY AUTOMATED COUNT: 13.6 % (ref 11.5–14.5)
GFR SERPLBLD CREATININE-BSD FMLA CKD-EPI: >60 ML/MIN/1.73/M2
GLUCOSE SERPL-MCNC: 111 MG/DL (ref 70–110)
HBA1C MFR BLD: 6.2 % (ref 4–5.6)
HCT VFR BLD AUTO: 42.5 % (ref 37–48.5)
HDLC SERPL-MCNC: 30 MG/DL (ref 40–75)
HDLC SERPL: 22.2 % (ref 20–50)
HGB BLD-MCNC: 14.1 GM/DL (ref 12–16)
IMM GRANULOCYTES # BLD AUTO: 0.08 K/UL (ref 0–0.04)
IMM GRANULOCYTES NFR BLD AUTO: 0.5 % (ref 0–0.5)
LDLC SERPL CALC-MCNC: 61.2 MG/DL (ref 63–159)
LYMPHOCYTES # BLD AUTO: 3.04 K/UL (ref 1–4.8)
MCH RBC QN AUTO: 28.5 PG (ref 27–31)
MCHC RBC AUTO-ENTMCNC: 33.2 G/DL (ref 32–36)
MCV RBC AUTO: 86 FL (ref 82–98)
NONHDLC SERPL-MCNC: 105 MG/DL
NUCLEATED RBC (/100WBC) (OHS): 0 /100 WBC
PLATELET # BLD AUTO: 406 K/UL (ref 150–450)
PMV BLD AUTO: 9.7 FL (ref 9.2–12.9)
POTASSIUM SERPL-SCNC: 4.3 MMOL/L (ref 3.5–5.1)
PROT SERPL-MCNC: 7 GM/DL (ref 6–8.4)
RBC # BLD AUTO: 4.95 M/UL (ref 4–5.4)
RELATIVE EOSINOPHIL (OHS): 1.5 %
RELATIVE LYMPHOCYTE (OHS): 18.7 % (ref 18–48)
RELATIVE MONOCYTE (OHS): 6.8 % (ref 4–15)
RELATIVE NEUTROPHIL (OHS): 71.8 % (ref 38–73)
SODIUM SERPL-SCNC: 135 MMOL/L (ref 136–145)
TRIGL SERPL-MCNC: 219 MG/DL (ref 30–150)
WBC # BLD AUTO: 16.28 K/UL (ref 3.9–12.7)

## 2025-07-09 PROCEDURE — 36415 COLL VENOUS BLD VENIPUNCTURE: CPT

## 2025-07-09 PROCEDURE — 83036 HEMOGLOBIN GLYCOSYLATED A1C: CPT

## 2025-07-09 PROCEDURE — 82565 ASSAY OF CREATININE: CPT

## 2025-07-09 PROCEDURE — 85025 COMPLETE CBC W/AUTO DIFF WBC: CPT

## 2025-07-09 PROCEDURE — 83718 ASSAY OF LIPOPROTEIN: CPT

## 2025-07-14 ENCOUNTER — PATIENT MESSAGE (OUTPATIENT)
Dept: INTERNAL MEDICINE | Facility: CLINIC | Age: 40
End: 2025-07-14
Payer: COMMERCIAL

## 2025-07-16 ENCOUNTER — LAB VISIT (OUTPATIENT)
Dept: LAB | Facility: HOSPITAL | Age: 40
End: 2025-07-16
Attending: INTERNAL MEDICINE
Payer: COMMERCIAL

## 2025-07-16 ENCOUNTER — OFFICE VISIT (OUTPATIENT)
Dept: INTERNAL MEDICINE | Facility: CLINIC | Age: 40
End: 2025-07-16
Payer: COMMERCIAL

## 2025-07-16 VITALS
BODY MASS INDEX: 49.9 KG/M2 | SYSTOLIC BLOOD PRESSURE: 114 MMHG | WEIGHT: 254.19 LBS | RESPIRATION RATE: 16 BRPM | HEIGHT: 60 IN | HEART RATE: 86 BPM | DIASTOLIC BLOOD PRESSURE: 70 MMHG | OXYGEN SATURATION: 98 %

## 2025-07-16 DIAGNOSIS — I10 PRIMARY HYPERTENSION: Chronic | ICD-10-CM

## 2025-07-16 DIAGNOSIS — E66.813 CLASS 3 SEVERE OBESITY DUE TO EXCESS CALORIES WITH SERIOUS COMORBIDITY AND BODY MASS INDEX (BMI) OF 50.0 TO 59.9 IN ADULT: Primary | ICD-10-CM

## 2025-07-16 DIAGNOSIS — E03.9 HYPOTHYROIDISM, UNSPECIFIED TYPE: Chronic | ICD-10-CM

## 2025-07-16 DIAGNOSIS — E11.9 TYPE 2 DIABETES MELLITUS WITHOUT COMPLICATION, WITHOUT LONG-TERM CURRENT USE OF INSULIN: ICD-10-CM

## 2025-07-16 DIAGNOSIS — G35 MULTIPLE SCLEROSIS: ICD-10-CM

## 2025-07-16 DIAGNOSIS — D84.9 IMMUNOSUPPRESSION: ICD-10-CM

## 2025-07-16 DIAGNOSIS — D72.829 LEUKOCYTOSIS, UNSPECIFIED TYPE: ICD-10-CM

## 2025-07-16 LAB
ALBUMIN/CREAT UR: 14.5 UG/MG
CREAT UR-MCNC: 89.7 MG/DL (ref 15–325)
MICROALBUMIN UR-MCNC: 13 UG/ML (ref ?–5000)

## 2025-07-16 PROCEDURE — 82043 UR ALBUMIN QUANTITATIVE: CPT

## 2025-07-16 PROCEDURE — 99999 PR PBB SHADOW E&M-EST. PATIENT-LVL IV: CPT | Mod: PBBFAC,,, | Performed by: INTERNAL MEDICINE

## 2025-07-16 PROCEDURE — 3074F SYST BP LT 130 MM HG: CPT | Mod: CPTII,S$GLB,, | Performed by: INTERNAL MEDICINE

## 2025-07-16 PROCEDURE — 1159F MED LIST DOCD IN RCRD: CPT | Mod: CPTII,S$GLB,, | Performed by: INTERNAL MEDICINE

## 2025-07-16 PROCEDURE — 3044F HG A1C LEVEL LT 7.0%: CPT | Mod: CPTII,S$GLB,, | Performed by: INTERNAL MEDICINE

## 2025-07-16 PROCEDURE — 1160F RVW MEDS BY RX/DR IN RCRD: CPT | Mod: CPTII,S$GLB,, | Performed by: INTERNAL MEDICINE

## 2025-07-16 PROCEDURE — 3008F BODY MASS INDEX DOCD: CPT | Mod: CPTII,S$GLB,, | Performed by: INTERNAL MEDICINE

## 2025-07-16 PROCEDURE — 3078F DIAST BP <80 MM HG: CPT | Mod: CPTII,S$GLB,, | Performed by: INTERNAL MEDICINE

## 2025-07-16 PROCEDURE — 99214 OFFICE O/P EST MOD 30 MIN: CPT | Mod: S$GLB,,, | Performed by: INTERNAL MEDICINE

## 2025-07-16 PROCEDURE — 4010F ACE/ARB THERAPY RXD/TAKEN: CPT | Mod: CPTII,S$GLB,, | Performed by: INTERNAL MEDICINE

## 2025-07-16 PROCEDURE — G2211 COMPLEX E/M VISIT ADD ON: HCPCS | Mod: S$GLB,,, | Performed by: INTERNAL MEDICINE

## 2025-07-16 NOTE — PROGRESS NOTES
Subjective:       Patient ID: Sandra France is a 39 y.o. female.    Chief Complaint: Follow-up (3 month follow up )      HPI:  Patient is known to me and presents for follow-up chronic medical problems.  She has HTN, diabetes type 2, MS, chronic migraine, hypothyroidism, depression and anxiety.  Labs from 07/09/2025 personally reviewed, interpreted and discussed today.   LFTs normal  LDL 61, normal  GFR greater than 60, normal  A1c 6.2%, at goal        HTN: on lisinopril 10mg daily. BP well controlled. Denies med side effects.     DM type 2:  Diagnosed at initial visit with A1C of 9.2%-A1c now at goal..  On metformin 500 mg daily and Ozempic 2 mg weekly.  She reports she is working on lifestyle changes already including low carb, low sweets in diet.   Denies medication side effects.  Weight is trending down.      MS: follows with Dr. Campos. On on teriflunomide. Uses gabapentin PRN for leg pain. Sx are currently stable. Neuro follow labs regarding meds.     Migraine: chronic headaches, neuro has attributed to MS. She was on topamax but weaned off over 1 year ago. She reports her headache is maintained.      Hypothyroidism: on liothyronine and levothyroxine. She has been on this combination for years and had been stable.   She thinks she has history of thyroid nodule. Diagnosed around age 21 yo.      Depression/anxiety: on lexapro and wellbutrin. working well. Denies med side effects.      Obesity: she is interested in working on weight loss.  She was on mounjaro in 2022 for a few months. Had saving coupon but than ran out and was unable to afford. She did try compounded mounjaro and then developed diverticulitis and is now s/p partial colectomy due to complications from diverticulitis.   We started Ozempic for diabetes management last visit.  Currently tolerating well.  Weight down about 20 lb since starting medication     JOHN: She has a CPAP. She does admit to poor compliance but she is working on being more  "compliant to combat her fatigue.      S/p hysterectomy for h/o endometriosis in 2024;  still has ovaries  S/p partial colectomy for h/o diverticulitis June 2024.      Tobacco use: 4 cigarettes a day. Working on quitting.  smokes so makes it more difficulty  EtOH use: denies daily use  Illicit drug use: denies use    Leukocytosis:  For you she has had fluctuations in her white blood cells with very mild elevation.  Over the last 3 months has remained stably elevated at 16 K.  Saw hematology at [facility] in the past. She missed a prior follow up but she will reschedule with Hematology.  In the past workup was unremarkable and was following up just for serial monitoring.      Past Medical History:   Diagnosis Date    Anemia     Anxiety     Anxiety     Back pain     Bleeds easily     Change in bowel habit     Changes in skin texture     Confusion     related to MS    Depression     Depression     Digestive disorder     GERD    Diverticular disease of large intestine without perforation or abscess     Dizziness     Dry skin     Fatigue     Frequent urination     Has daytime drowsiness     Headache     Heartburn     History of weakness of extremity     HTN (hypertension)     Hx of psychiatric care     Insomnia     Kidney stone     Lethargy     Loose stools     Memory loss     Migraine     MS (multiple sclerosis)     Multiple sclerosis     Neck pain     Neuropathy     Numbness of extremity     Optic neuritis     Resolved    PCOS (polycystic ovarian syndrome)     Psychiatric problem     Rash     Restless sleeper     Sleep apnea     Doest Not use CPAP    Stomach pain     Therapy     Thyroid disease     Hypothyroid    Tingling in extremities     Tiredness     Trouble walking     Resolved    Unsteady gait     Urine troubles     Voiding dysfunction     "pelvic floor issues" not on any medications    Wears glasses     Wheezing     Worries        Family History   Problem Relation Name Age of Onset    Depression Mother Jasmyn     " Hypothyroidism Mother Jasmyn     Thyroid disease Mother Jasmyn     Hypertension Father Bill     Diabetes Maternal Grandfather Wolfgang     Cancer Maternal Grandmother Bita     Cancer Paternal Grandfather Navid        Social History[1]    Review of Systems   Constitutional:  Negative for activity change, fatigue, fever and unexpected weight change.   HENT:  Negative for congestion, ear pain, hearing loss, rhinorrhea and sore throat.    Eyes:  Negative for redness and visual disturbance.   Respiratory:  Negative for cough, shortness of breath and wheezing.    Cardiovascular:  Negative for chest pain, palpitations and leg swelling.   Gastrointestinal:  Negative for abdominal pain, constipation, diarrhea, nausea and vomiting.   Musculoskeletal:  Negative for back pain, joint swelling and neck pain.   Skin:  Negative for color change, rash and wound.   Neurological:  Negative for dizziness, light-headedness and headaches.         Objective:      Physical Exam  Vitals reviewed.   Constitutional:       General: She is not in acute distress.     Appearance: She is well-developed.   HENT:      Head: Normocephalic and atraumatic.      Right Ear: External ear normal.      Left Ear: External ear normal.      Nose: Nose normal.   Eyes:      General:         Right eye: No discharge.         Left eye: No discharge.      Conjunctiva/sclera: Conjunctivae normal.   Neck:      Thyroid: No thyromegaly.   Cardiovascular:      Rate and Rhythm: Normal rate and regular rhythm.   Pulmonary:      Effort: Pulmonary effort is normal. No respiratory distress.   Skin:     General: Skin is warm and dry.   Neurological:      Mental Status: She is alert and oriented to person, place, and time. Mental status is at baseline.   Psychiatric:         Behavior: Behavior normal.         Thought Content: Thought content normal.       Assessment:       1. Class 3 severe obesity due to excess calories with serious comorbidity and body mass index (BMI) of 50.0 to  59.9 in adult    2. Hypothyroidism, unspecified type    3. Primary hypertension    4. Multiple sclerosis    5. Immunosuppression    6. Type 2 diabetes mellitus without complication, without long-term current use of insulin    7. Leukocytosis, unspecified type        Plan:       1. Class 3 severe obesity due to excess calories with serious comorbidity and body mass index (BMI) of 50.0 to 59.9 in adult  Chronic improving  Continue GLP 1  Continue healthy diet exercise and weight loss  -     CBC Auto Differential; Future; Expected date: 01/12/2026  -     Comprehensive Metabolic Panel; Future; Expected date: 01/12/2026  -     TSH; Future; Expected date: 01/12/2026  -     Lipid Panel; Future; Expected date: 01/12/2026  -     Hemoglobin A1C; Future; Expected date: 01/12/2026  -     Microalbumin/Creatinine Ratio, Urine; Future; Expected date: 01/12/2026    2. Hypothyroidism, unspecified type  Chronic controlled  Continue Synthroid and Cytomel same dose  Follow labs per orders  -     CBC Auto Differential; Future; Expected date: 01/12/2026  -     Comprehensive Metabolic Panel; Future; Expected date: 01/12/2026  -     TSH; Future; Expected date: 01/12/2026  -     Lipid Panel; Future; Expected date: 01/12/2026  -     Hemoglobin A1C; Future; Expected date: 01/12/2026  -     Microalbumin/Creatinine Ratio, Urine; Future; Expected date: 01/12/2026    3. Primary hypertension  Chronic controlled  Continue medications same dose  Low-sodium diet  Check blood pressure keep log    4. Multiple sclerosis  Chronic stable  Management per neurology    5. Immunosuppression  Chronic stable  Medication management per Neurology    6. Type 2 diabetes mellitus without complication, without long-term current use of insulin  Chronic controlled  Continue medications same dose  ADA diet  Check blood sugar and keep log  -     CBC Auto Differential; Future; Expected date: 01/12/2026  -     Comprehensive Metabolic Panel; Future; Expected date:  01/12/2026  -     TSH; Future; Expected date: 01/12/2026  -     Lipid Panel; Future; Expected date: 01/12/2026  -     Hemoglobin A1C; Future; Expected date: 01/12/2026  -     Microalbumin/Creatinine Ratio, Urine; Future; Expected date: 01/12/2026  -     Diabetic Eye Screening Photo; Future  -     Microalbumin/Creatinine Ratio, Urine; Future; Expected date: 07/16/2025    7. Leukocytosis, unspecified type  Chronic stable  States she will schedule follow up with Hematology at [facility] for continued monitoring       Return to clinic 6 months with labs and PRN                 [1]   Social History  Socioeconomic History    Marital status:    Tobacco Use    Smoking status: Some Days     Current packs/day: 0.25     Average packs/day: 0.3 packs/day for 13.7 years (3.4 ttl pk-yrs)     Types: Cigarettes     Start date: 8/17/2020     Passive exposure: Current    Smokeless tobacco: Never    Tobacco comments:     Began smoking end of 2022.currently smoking 2-3 cigarettes/day.   Substance and Sexual Activity    Alcohol use: No     Comment: rarely    Drug use: Never    Sexual activity: Yes     Partners: Male     Birth control/protection: See Surgical Hx     Comment:      Social Drivers of Health     Financial Resource Strain: Medium Risk (7/15/2025)    Overall Financial Resource Strain (CARDIA)     Difficulty of Paying Living Expenses: Somewhat hard   Food Insecurity: No Food Insecurity (7/15/2025)    Hunger Vital Sign     Worried About Running Out of Food in the Last Year: Never true     Ran Out of Food in the Last Year: Never true   Transportation Needs: No Transportation Needs (7/15/2025)    PRAPARE - Transportation     Lack of Transportation (Medical): No     Lack of Transportation (Non-Medical): No   Physical Activity: Inactive (7/15/2025)    Exercise Vital Sign     Days of Exercise per Week: 0 days     Minutes of Exercise per Session: 0 min   Stress: Stress Concern Present (7/15/2025)    [employer]  [employer] - Occupational Stress Questionnaire     Feeling of Stress : To some extent   Housing Stability: High Risk (7/15/2025)    Housing Stability Vital Sign     Unable to Pay for Housing in the Last Year: Yes     Number of Times Moved in the Last Year: 0     Homeless in the Last Year: No

## 2025-07-18 ENCOUNTER — CLINICAL SUPPORT (OUTPATIENT)
Dept: INTERNAL MEDICINE | Facility: CLINIC | Age: 40
End: 2025-07-18
Attending: INTERNAL MEDICINE
Payer: COMMERCIAL

## 2025-07-18 DIAGNOSIS — E11.9 TYPE 2 DIABETES MELLITUS WITHOUT COMPLICATION, WITHOUT LONG-TERM CURRENT USE OF INSULIN: ICD-10-CM

## 2025-07-18 NOTE — PROGRESS NOTES
Sandra France is a 39 y.o. female here for a diabetic eye screening with non-dilated fundus photos per Beth Huston MD .    Patient cooperative?: Yes  Small pupils?: Yes  Last eye exam: unknown    For exam results, see Encounter Report.

## 2025-07-22 ENCOUNTER — PATIENT MESSAGE (OUTPATIENT)
Dept: NEUROLOGY | Facility: CLINIC | Age: 40
End: 2025-07-22
Payer: COMMERCIAL

## 2025-07-30 ENCOUNTER — PATIENT MESSAGE (OUTPATIENT)
Dept: PSYCHIATRY | Facility: CLINIC | Age: 40
End: 2025-07-30
Payer: COMMERCIAL

## 2025-08-01 ENCOUNTER — PATIENT MESSAGE (OUTPATIENT)
Dept: PSYCHIATRY | Facility: CLINIC | Age: 40
End: 2025-08-01
Payer: COMMERCIAL